# Patient Record
Sex: FEMALE | Race: WHITE | NOT HISPANIC OR LATINO | Employment: OTHER | URBAN - NONMETROPOLITAN AREA
[De-identification: names, ages, dates, MRNs, and addresses within clinical notes are randomized per-mention and may not be internally consistent; named-entity substitution may affect disease eponyms.]

---

## 2017-03-09 ENCOUNTER — OFFICE VISIT (OUTPATIENT)
Dept: CARDIOLOGY | Facility: CLINIC | Age: 52
End: 2017-03-09

## 2017-03-09 VITALS
BODY MASS INDEX: 26.73 KG/M2 | HEART RATE: 98 BPM | HEIGHT: 64 IN | DIASTOLIC BLOOD PRESSURE: 81 MMHG | SYSTOLIC BLOOD PRESSURE: 131 MMHG | OXYGEN SATURATION: 97 % | WEIGHT: 156.6 LBS

## 2017-03-09 DIAGNOSIS — R00.2 PALPITATIONS: ICD-10-CM

## 2017-03-09 DIAGNOSIS — R07.89 OTHER CHEST PAIN: Primary | ICD-10-CM

## 2017-03-09 DIAGNOSIS — R06.02 SHORTNESS OF BREATH: ICD-10-CM

## 2017-03-09 PROBLEM — R07.9 CHEST PAIN: Status: ACTIVE | Noted: 2017-03-09

## 2017-03-09 PROCEDURE — 93000 ELECTROCARDIOGRAM COMPLETE: CPT | Performed by: PHYSICIAN ASSISTANT

## 2017-03-09 PROCEDURE — 99204 OFFICE O/P NEW MOD 45 MIN: CPT | Performed by: PHYSICIAN ASSISTANT

## 2017-03-09 RX ORDER — FERROUS SULFATE 325(65) MG
325 TABLET ORAL DAILY
COMMUNITY
Start: 2017-02-17 | End: 2018-09-25

## 2017-03-09 RX ORDER — GABAPENTIN 800 MG/1
800 TABLET ORAL 3 TIMES DAILY
Status: ON HOLD | COMMUNITY
End: 2021-10-10

## 2017-03-09 RX ORDER — TRAMADOL HYDROCHLORIDE 50 MG/1
50 TABLET ORAL EVERY 6 HOURS PRN
COMMUNITY
End: 2018-09-25

## 2017-03-09 RX ORDER — ATORVASTATIN CALCIUM 20 MG/1
20 TABLET, FILM COATED ORAL DAILY
Qty: 30 TABLET | Refills: 11 | Status: SHIPPED | OUTPATIENT
Start: 2017-03-09 | End: 2018-09-25

## 2017-03-09 RX ORDER — INSULIN GLARGINE 100 [IU]/ML
34 INJECTION, SOLUTION SUBCUTANEOUS 2 TIMES DAILY
Status: ON HOLD | COMMUNITY
End: 2021-10-10

## 2017-03-09 RX ORDER — NITROGLYCERIN 0.4 MG/1
TABLET SUBLINGUAL
Qty: 100 TABLET | Refills: 11 | Status: SHIPPED | OUTPATIENT
Start: 2017-03-09 | End: 2018-09-25

## 2017-03-09 NOTE — PROGRESS NOTES
Subjective   Jessica Bravo is a 51 y.o. female     Chief Complaint   Patient presents with   • Establish Care   • Chest Pain       HPI    Problem list  1. Insulin-dependent diabetes mellitus  2. Neuropathy  3. Iron deficiency anemia  4. Chest pain    Patient is a 51-year-old female that presents to our office for an initial evaluation. She has no known coronary artery disease.    She has been experiencing episodes of chest discomfort described as a stabbing sensation in the left anterior chest. This happens randomly although she does note it worsening with inspiration.    She has mild dyspnea with exertion but nothing that has been progressive. She denies PND orthopnea. She does experience palpitations on occasion. She denies any associated dizziness presyncope or syncope. Otherwise she is feeling well      Current Outpatient Prescriptions   Medication Sig Dispense Refill   • ferrous sulfate 325 (65 FE) MG tablet Daily.     • gabapentin (NEURONTIN) 800 MG tablet Take 800 mg by mouth. Bid - qid     • insulin glargine (LANTUS) 100 UNIT/ML injection Inject 34 Units under the skin 2 (Two) Times a Day.     • metFORMIN (GLUCOPHAGE) 1000 MG tablet Take 500 mg by mouth 2 (Two) Times a Day With Meals.     • traMADol (ULTRAM) 50 MG tablet Take 50 mg by mouth Every 6 (Six) Hours As Needed for moderate pain (4-6).     • aspirin 81 MG tablet Take 1 tablet by mouth Daily. 30 tablet 11   • atorvastatin (LIPITOR) 20 MG tablet Take 1 tablet by mouth Daily. 30 tablet 11   • nitroglycerin (NITROSTAT) 0.4 MG SL tablet 1 under the tongue as needed for angina, may repeat q5mins for up three doses 100 tablet 11     No current facility-administered medications for this visit.        Benadryl [diphenhydramine hcl (sleep)] and Penicillins    Past Medical History   Diagnosis Date   • Arrhythmia    • Chronic kidney disease      only has one kidney   • Diabetes mellitus        Social History     Social History   • Marital status: Single     Spouse  "name: N/A   • Number of children: N/A   • Years of education: N/A     Occupational History   • Not on file.     Social History Main Topics   • Smoking status: Never Smoker   • Smokeless tobacco: Not on file   • Alcohol use No   • Drug use: No   • Sexual activity: Not on file     Other Topics Concern   • Not on file     Social History Narrative   • No narrative on file       @[facility]@    Review of Systems   Constitutional: Positive for fatigue.   HENT: Positive for ear pain.    Eyes: Positive for visual disturbance (reading glasses).   Respiratory: Positive for shortness of breath.    Cardiovascular: Positive for chest pain, palpitations and leg swelling.   Gastrointestinal: Positive for constipation.   Endocrine: Negative.    Genitourinary: Positive for decreased urine volume.   Musculoskeletal: Positive for arthralgias and myalgias.   Skin: Negative.    Allergic/Immunologic: Negative.    Neurological: Positive for dizziness and light-headedness.   Hematological: Bruises/bleeds easily.   Psychiatric/Behavioral: Positive for sleep disturbance.       Objective     Visit Vitals   • /81 (BP Location: Left arm, Patient Position: Sitting)   • Pulse 98   • Ht 64\" (162.6 cm)   • Wt 156 lb 9.6 oz (71 kg)   • SpO2 97%   • BMI 26.88 kg/m2       Lab Results (most recent)     None          Physical Exam   Constitutional: She is oriented to person, place, and time. She appears well-developed and well-nourished. No distress.   HENT:   Head: Normocephalic and atraumatic.   Eyes: EOM are normal. Pupils are equal, round, and reactive to light.   Neck: No JVD present.   Cardiovascular: Normal rate, regular rhythm and normal heart sounds.  Exam reveals no gallop and no friction rub.    No murmur heard.  Pulmonary/Chest: Effort normal and breath sounds normal. No respiratory distress. She has no wheezes. She has no rales. She exhibits no tenderness.   Musculoskeletal: Normal range of motion. She exhibits no edema. "   Neurological: She is alert and oriented to person, place, and time. No cranial nerve deficit.   Skin: Skin is warm and dry. No rash noted. No erythema. No pallor.   Psychiatric: She has a normal mood and affect. Her behavior is normal.   Nursing note and vitals reviewed.      Procedure     ECG 12 Lead  Date/Time: 3/9/2017 1:25 PM  Performed by: MATTY ALFONSO  Authorized by: MATTY ALFONSO   Comments: EKG indication chest pain    EKG demonstrates sinus rhythm at 89 bpm, no acute ST changes                 Assessment/Plan     Problems Addressed this Visit        Cardiovascular and Mediastinum    Palpitations    Relevant Orders    ECG 12 Lead    Stress Test With Myocardial Perfusion One Day    Adult Transthoracic Echo Complete    D-dimer, Quantitative       Respiratory    Shortness of breath    Relevant Orders    Stress Test With Myocardial Perfusion One Day    Adult Transthoracic Echo Complete    D-dimer, Quantitative       Nervous and Auditory    Chest pain - Primary    Relevant Orders    ECG 12 Lead    Stress Test With Myocardial Perfusion One Day    Adult Transthoracic Echo Complete    D-dimer, Quantitative                Recommendations  1. Because of symptoms we would like to schedule for an ischemia assessment. We will also like to get echocardiogram to evaluate systolic and diastolic function and rule out effusion.  2. With pleuritic chest pain, tachycardia, we will like to get a d-dimer to rule out possibility of PE.  3. She is on aspirin daily. We are adding statin because of her diabetic status. We are sending in nitroglycerin as needed for chest pain. We will see her back for follow-up of above testing. Follow-up primary as scheduled

## 2017-03-29 ENCOUNTER — APPOINTMENT (OUTPATIENT)
Dept: CARDIOLOGY | Facility: HOSPITAL | Age: 52
End: 2017-03-29

## 2017-04-18 ENCOUNTER — OUTSIDE FACILITY SERVICE (OUTPATIENT)
Dept: CARDIOLOGY | Facility: CLINIC | Age: 52
End: 2017-04-18

## 2017-04-18 ENCOUNTER — HOSPITAL ENCOUNTER (OUTPATIENT)
Dept: CARDIOLOGY | Facility: HOSPITAL | Age: 52
Discharge: HOME | End: 2017-04-18

## 2017-04-18 DIAGNOSIS — R06.02 SHORTNESS OF BREATH: ICD-10-CM

## 2017-04-18 DIAGNOSIS — R07.9 CHEST PAIN, UNSPECIFIED TYPE: ICD-10-CM

## 2017-04-18 DIAGNOSIS — R53.1 WEAKNESS: ICD-10-CM

## 2017-04-18 LAB
MAXIMAL PREDICTED HEART RATE: 167 BPM
STRESS TARGET HR: 142 BPM

## 2017-04-18 PROCEDURE — 93306 TTE W/DOPPLER COMPLETE: CPT | Performed by: INTERNAL MEDICINE

## 2017-04-18 PROCEDURE — 93306 TTE W/DOPPLER COMPLETE: CPT

## 2017-04-18 PROCEDURE — 93017 CV STRESS TEST TRACING ONLY: CPT

## 2017-04-18 PROCEDURE — 78452 HT MUSCLE IMAGE SPECT MULT: CPT | Performed by: INTERNAL MEDICINE

## 2017-04-18 PROCEDURE — 93018 CV STRESS TEST I&R ONLY: CPT | Performed by: INTERNAL MEDICINE

## 2017-04-18 PROCEDURE — 25010000002 REGADENOSON 0.4 MG/5ML SOLUTION: Performed by: INTERNAL MEDICINE

## 2017-04-18 PROCEDURE — A9500 TC99M SESTAMIBI: HCPCS | Performed by: INTERNAL MEDICINE

## 2017-04-18 PROCEDURE — 0 TECHNETIUM SESTAMIBI: Performed by: INTERNAL MEDICINE

## 2017-04-18 PROCEDURE — 78452 HT MUSCLE IMAGE SPECT MULT: CPT

## 2017-04-18 RX ADMIN — Medication 1 DOSE: at 11:00

## 2017-04-18 RX ADMIN — REGADENOSON 0.4 MG: 0.08 INJECTION, SOLUTION INTRAVENOUS at 11:00

## 2017-04-20 ENCOUNTER — DOCUMENTATION (OUTPATIENT)
Dept: CARDIOLOGY | Facility: CLINIC | Age: 52
End: 2017-04-20

## 2017-04-20 NOTE — PROGRESS NOTES
Patient aware of echo and stress test results and transferred to [facility] to schedule f/u appt. Patient stated that she is never going to have that dye test again because she has been sick as a dog since then. Nausea, diarrhea etc. PH,LPN

## 2018-09-25 ENCOUNTER — OFFICE VISIT (OUTPATIENT)
Dept: CARDIOLOGY | Facility: CLINIC | Age: 53
End: 2018-09-25

## 2018-09-25 VITALS
SYSTOLIC BLOOD PRESSURE: 136 MMHG | HEIGHT: 64 IN | OXYGEN SATURATION: 97 % | DIASTOLIC BLOOD PRESSURE: 81 MMHG | WEIGHT: 177 LBS | BODY MASS INDEX: 30.22 KG/M2 | HEART RATE: 90 BPM

## 2018-09-25 DIAGNOSIS — R07.9 CHEST PAIN, UNSPECIFIED TYPE: Primary | ICD-10-CM

## 2018-09-25 DIAGNOSIS — R53.1 WEAKNESS: ICD-10-CM

## 2018-09-25 DIAGNOSIS — R06.02 SHORTNESS OF BREATH: ICD-10-CM

## 2018-09-25 PROCEDURE — 99214 OFFICE O/P EST MOD 30 MIN: CPT | Performed by: PHYSICIAN ASSISTANT

## 2018-09-25 RX ORDER — NITROGLYCERIN 0.4 MG/1
TABLET SUBLINGUAL
Qty: 100 TABLET | Refills: 11 | Status: ON HOLD | OUTPATIENT
Start: 2018-09-25 | End: 2021-10-10

## 2018-09-25 RX ORDER — ISOSORBIDE MONONITRATE 30 MG/1
30 TABLET, EXTENDED RELEASE ORAL EVERY MORNING
Qty: 30 TABLET | Refills: 11 | Status: ON HOLD | OUTPATIENT
Start: 2018-09-25 | End: 2021-10-10

## 2018-09-25 RX ORDER — CLOPIDOGREL BISULFATE 75 MG/1
75 TABLET ORAL DAILY
Qty: 30 TABLET | Refills: 11 | Status: ON HOLD | OUTPATIENT
Start: 2018-09-25 | End: 2021-10-10

## 2018-09-25 NOTE — PROGRESS NOTES
Problem list     Subjective   Jessica Bravo is a 53 y.o. female     Chief Complaint   Patient presents with   • Chest Pain     presents as a follow up   • Shortness of Breath       HPI    Problem list  1.  Chest pain  1.1 nuclear stress test April 2018 with no evidence of ischemia  2.  Preserved systolic function  3.  Insulin-dependent diabetes mellitus  4.  Dyslipidemia    Patient is a 53-year-old female that presents back for follow-up.  She had an ischemia assessment done a few months ago.  Workup was unremarkable.  Patient was since referred back to the office as she has had progressive and profound symptoms.  Patient describes having significant chest pressure and heaviness that is now occurring at rest.  She has been experiencing arm discomfort and arm heaviness.  She also describes profile levels of exertional dyspnea.  She describes just walking across her living room she will have profound dyspnea and weakness.  She feels her per symptoms of chest pain and shortness of breath or progressed to significant degree.  She has significant weakness.  She describes a lack of energy.  She is concerned about the progressive nature of her symptoms.  She has no palpitations or dysrhythmic symptoms.  She otherwise is doing well      Outpatient Encounter Prescriptions as of 9/25/2018   Medication Sig Dispense Refill   • aspirin 81 MG tablet Take 1 tablet by mouth Daily. 30 tablet 11   • gabapentin (NEURONTIN) 800 MG tablet Take 800 mg by mouth. Bid - qid     • insulin glargine (LANTUS) 100 UNIT/ML injection Inject 34 Units under the skin 2 (Two) Times a Day.     • [DISCONTINUED] atorvastatin (LIPITOR) 20 MG tablet Take 1 tablet by mouth Daily. 30 tablet 11   • [DISCONTINUED] ferrous sulfate 325 (65 FE) MG tablet Take 325 mg by mouth Daily.     • clopidogrel (PLAVIX) 75 MG tablet Take 1 tablet by mouth Daily. 30 tablet 11   • isosorbide mononitrate (IMDUR) 30 MG 24 hr tablet Take 1 tablet by mouth Every Morning. 30 tablet 11    • nitroglycerin (NITROSTAT) 0.4 MG SL tablet 1 under the tongue as needed for angina, may repeat q5mins for up three doses 100 tablet 11   • [DISCONTINUED] metFORMIN (GLUCOPHAGE) 1000 MG tablet Take 500 mg by mouth 2 (Two) Times a Day With Meals.     • [DISCONTINUED] nitroglycerin (NITROSTAT) 0.4 MG SL tablet 1 under the tongue as needed for angina, may repeat q5mins for up three doses 100 tablet 11   • [DISCONTINUED] traMADol (ULTRAM) 50 MG tablet Take 50 mg by mouth Every 6 (Six) Hours As Needed for moderate pain (4-6).       No facility-administered encounter medications on file as of 9/25/2018.        Benadryl [diphenhydramine hcl (sleep)] and Penicillins    Past Medical History:   Diagnosis Date   • Arrhythmia    • Chronic kidney disease     only has one kidney, has been in hoptal 3 times since last visit    • Diabetes mellitus ([facility])        Social History     Social History   • Marital status: Single     Spouse name: N/A   • Number of children: N/A   • Years of education: N/A     Occupational History   • Not on file.     Social History Main Topics   • Smoking status: Never Smoker   • Smokeless tobacco: Never Used   • Alcohol use No   • Drug use: No   • Sexual activity: Not on file     Other Topics Concern   • Not on file     Social History Narrative   • No narrative on file       Family History   Problem Relation Age of Onset   • Diabetes Mother    • Hypertension Mother    • Diabetes Father    • Hypertension Father    • Diabetes Sister    • Hypertension Sister    • Diabetes Brother        Review of Systems   Constitutional: Positive for fatigue.   HENT: Negative.    Eyes: Negative for visual disturbance.   Respiratory: Positive for shortness of breath.    Cardiovascular: Positive for chest pain ( arm pain, squeezing feeling in chest ) and palpitations. Negative for leg swelling.   Gastrointestinal: Positive for abdominal pain ( heart burn), nausea and vomiting. Negative for constipation and diarrhea.  "  Endocrine: Negative.    Genitourinary: Negative for difficulty urinating.   Musculoskeletal: Positive for arthralgias, back pain and neck pain. Negative for myalgias.   Skin: Negative.    Allergic/Immunologic: Negative for environmental allergies and food allergies.   Neurological: Negative for dizziness and light-headedness.   Hematological: Does not bruise/bleed easily.   Psychiatric/Behavioral: The patient is nervous/anxious.    All other systems reviewed and are negative.      Objective   Vitals:    09/25/18 1059   BP: 136/81   BP Location: Left arm   Patient Position: Sitting   Pulse: 90   SpO2: 97%   Weight: 80.3 kg (177 lb)   Height: 162.6 cm (64\")      /81 (BP Location: Left arm, Patient Position: Sitting)   Pulse 90   Ht 162.6 cm (64\")   Wt 80.3 kg (177 lb)   SpO2 97%   BMI 30.38 kg/m²     Lab Results (most recent)     None          Physical Exam   Constitutional: She is oriented to person, place, and time. She appears well-developed and well-nourished. No distress.   HENT:   Head: Normocephalic and atraumatic.   Eyes: Pupils are equal, round, and reactive to light. EOM are normal.   Neck: No JVD present.   Cardiovascular: Normal rate, regular rhythm, normal heart sounds and intact distal pulses.  Exam reveals no gallop and no friction rub.    No murmur heard.  Pulmonary/Chest: Effort normal and breath sounds normal. No respiratory distress. She has no wheezes. She has no rales. She exhibits no tenderness.   Musculoskeletal: Normal range of motion. She exhibits no edema.   Neurological: She is alert and oriented to person, place, and time. No cranial nerve deficit.   Skin: Skin is warm and dry. No rash noted. No erythema. No pallor.   Psychiatric: She has a normal mood and affect. Her behavior is normal.   Nursing note and vitals reviewed.      Procedure   Procedures       Assessment/Plan     Problems Addressed this Visit        Respiratory    Shortness of breath    Relevant Orders    CBC & " Differential    Comprehensive Metabolic Panel    Cardiac catheterization       Nervous and Auditory    Chest pain - Primary    Relevant Orders    CBC & Differential    Comprehensive Metabolic Panel    Cardiac catheterization       Other    Weakness    Relevant Orders    CBC & Differential    Comprehensive Metabolic Panel    Cardiac catheterization              Recommendation  1.  Patient is very concerned about the nature of her symptoms and the rapid progression.  She describes having profile levels of exertional dyspnea, significant declining functional status with resting chest discomfort and referral of pain into her arm.  She has significant risk factors to include insulin-dependent diabetes mellitus, dyslipidemia and hypertension.  Patient would like definitive assessment of her coronary anatomy.  Therefore because of progressive low-level symptoms and being prescribed antianginal therapy, she will be scheduled for left heart catheterization for definitive assessment.  2.  We will see patient back for follow-up after above testing.  She will be placed on dual antiplatelet therapy.  She describes already being on cholesterol medication.  We currently do not have that but that was prescribed by primary.  We will see her back for follow-up after catheterization.  Any chest pain or supple nitroglycerin, she'll go to the ER.  She will follow-up primary as scheduled            Patient's Body mass index is 30.38 kg/m². BMI is above normal parameters. Recommendations include: educational material.       Electronically signed by:

## 2018-09-25 NOTE — PATIENT INSTRUCTIONS
Obesity, Adult  Obesity is the condition of having too much total body fat. Being overweight or obese means that your weight is greater than what is considered healthy for your body size. Obesity is determined by a measurement called BMI. BMI is an estimate of body fat and is calculated from height and weight. For adults, a BMI of 30 or higher is considered obese.  Obesity can eventually lead to other health concerns and major illnesses, including:  · Stroke.  · Coronary artery disease (CAD).  · Type 2 diabetes.  · Some types of cancer, including cancers of the colon, breast, uterus, and gallbladder.  · Osteoarthritis.  · High blood pressure (hypertension).  · High cholesterol.  · Sleep apnea.  · Gallbladder stones.  · Infertility problems.    What are the causes?  The main cause of obesity is taking in (consuming) more calories than your body uses for energy. Other factors that contribute to this condition may include:  · Being born with genes that make you more likely to become obese.  · Having a medical condition that causes obesity. These conditions include:  ? Hypothyroidism.  ? Polycystic ovarian syndrome (PCOS).  ? Binge-eating disorder.  ? Cushing syndrome.  · Taking certain medicines, such as steroids, antidepressants, and seizure medicines.  · Not being physically active (sedentary lifestyle).  · Living where there are limited places to exercise safely or buy healthy foods.  · Not getting enough sleep.    What increases the risk?  The following factors may increase your risk of this condition:  · Having a family history of obesity.  · Being a woman of -American descent.  · Being a man of  descent.    What are the signs or symptoms?  Having excessive body fat is the main symptom of this condition.  How is this diagnosed?  This condition may be diagnosed based on:  · Your symptoms.  · Your medical history.  · A physical exam. Your health care provider may measure:  ? Your BMI. If you are an  adult with a BMI between 25 and less than 30, you are considered overweight. If you are an adult with a BMI of 30 or higher, you are considered obese.  ? The distances around your hips and your waist (circumferences). These may be compared to each other to help diagnose your condition.  ? Your skinfold thickness. Your health care provider may gently pinch a fold of your skin and measure it.    How is this treated?  Treatment for this condition often includes changing your lifestyle. Treatment may include some or all of the following:  · Dietary changes. Work with your health care provider and a dietitian to set a weight-loss goal that is healthy and reasonable for you. Dietary changes may include eating:  ? Smaller portions. A portion size is the amount of a particular food that is healthy for you to eat at one time. This varies from person to person.  ? Low-calorie or low-fat options.  ? More whole grains, fruits, and vegetables.  · Regular physical activity. This may include aerobic activity (cardio) and strength training.  · Medicine to help you lose weight. Your health care provider may prescribe medicine if you are unable to lose 1 pound a week after 6 weeks of eating more healthily and doing more physical activity.  · Surgery. Surgical options may include gastric banding and gastric bypass. Surgery may be done if:  ? Other treatments have not helped to improve your condition.  ? You have a BMI of 40 or higher.  ? You have life-threatening health problems related to obesity.    Follow these instructions at home:    Eating and drinking    · Follow recommendations from your health care provider about what you eat and drink. Your health care provider may advise you to:  ? Limit fast foods, sweets, and processed snack foods.  ? Choose low-fat options, such as low-fat milk instead of whole milk.  ? Eat 5 or more servings of fruits or vegetables every day.  ? Eat at home more often. This gives you more control over  what you eat.  ? Choose healthy foods when you eat out.  ? Learn what a healthy portion size is.  ? Keep low-fat snacks on hand.  ? Avoid sugary drinks, such as soda, fruit juice, iced tea sweetened with sugar, and flavored milk.  ? Eat a healthy breakfast.  · Drink enough water to keep your urine clear or pale yellow.  · Do not go without eating for long periods of time (do not fast) or follow a fad diet. Fasting and fad diets can be unhealthy and even dangerous.  Physical Activity  · Exercise regularly, as told by your health care provider. Ask your health care provider what types of exercise are safe for you and how often you should exercise.  · Warm up and stretch before being active.  · Cool down and stretch after being active.  · Rest between periods of activity.  Lifestyle  · Limit the time that you spend in front of your TV, computer, or video game system.  · Find ways to reward yourself that do not involve food.  · Limit alcohol intake to no more than 1 drink a day for nonpregnant women and 2 drinks a day for men. One drink equals 12 oz of beer, 5 oz of wine, or 1½ oz of hard liquor.  General instructions  · Keep a weight loss journal to keep track of the food you eat and how much you exercise you get.  · Take over-the-counter and prescription medicines only as told by your health care provider.  · Take vitamins and supplements only as told by your health care provider.  · Consider joining a support group. Your health care provider may be able to recommend a support group.  · Keep all follow-up visits as told by your health care provider. This is important.  Contact a health care provider if:  · You are unable to meet your weight loss goal after 6 weeks of dietary and lifestyle changes.  This information is not intended to replace advice given to you by your health care provider. Make sure you discuss any questions you have with your health care provider.  Document Released: 01/25/2006 Document Revised:  05/22/2017 Document Reviewed: 10/05/2016  [employer] Interactive Patient Education © 2018 [employer]  MyPlate from [employer]  The general, healthful diet is based on the 2010 Dietary Guidelines for Americans. The amount of food you need to eat from each food group depends on your age, sex, and level of physical activity and can be individualized by a dietitian. Go to ChooseMyPlate.gov for more information.  What do I need to know about the MyPlate plan?  · Enjoy your food, but eat less.  · Avoid oversized portions.  ? ½ of your plate should include fruits and vegetables.  ? ¼ of your plate should be grains.  ? ¼ of your plate should be protein.  Grains  · Make at least half of your grains whole grains.  · For a 2,000 calorie daily food plan, eat 6 oz every day.  · 1 oz is about 1 slice bread, 1 cup cereal, or ½ cup cooked rice, cereal, or pasta.  Vegetables  · Make half your plate fruits and vegetables.  · For a 2,000 calorie daily food plan, eat 2½ cups every day.  · 1 cup is about 1 cup raw or cooked vegetables or vegetable juice or 2 cups raw leafy greens.  Fruits  · Make half your plate fruits and vegetables.  · For a 2,000 calorie daily food plan, eat 2 cups every day.  · 1 cup is about 1 cup fruit or 100% fruit juice or ½ cup dried fruit.  Protein  · For a 2,000 calorie daily food plan, eat 5½ oz every day.  · 1 oz is about 1 oz meat, poultry, or fish, ¼ cup cooked beans, 1 egg, 1 Tbsp peanut butter, or ½ oz nuts or seeds.  Dairy  · Switch to fat-free or low-fat (1%) milk.  · For a 2,000 calorie daily food plan, eat 3 cups every day.  · 1 cup is about 1 cup milk or yogurt or soy milk (soy beverage), 1½ oz natural cheese, or 2 oz processed cheese.  Fats, Oils, and Empty Calories  · Only small amounts of oils are recommended.  · Empty calories are calories from solid fats or added sugars.  · Compare sodium in foods like soup, bread, and frozen meals. Choose the foods with lower numbers.  · Drink water instead of  sugary drinks.  What foods can I eat?  Grains  Whole grains such as whole wheat, quinoa, millet, and bulgur. Bread, rolls, and pasta made from whole grains. Brown or wild rice. Hot or cold cereals made from whole grains and without added sugar.  Vegetables  All fresh vegetables, especially fresh red, dark green, or orange vegetables. Peas and beans. Low-sodium frozen or canned vegetables prepared without added salt. Low-sodium vegetable juices.  Fruits  All fresh, frozen, and dried fruits. Canned fruit packed in water or fruit juice without added sugar. Fruit juices without added sugar.  Meats and Other Protein Sources  Boiled, baked, or grilled lean meat trimmed of fat. Skinless poultry. Fresh seafood and shellfish. Canned seafood packed in water. Unsalted nuts and unsalted nut butters. Tofu. Dried beans and pea. Eggs.  Dairy  Low-fat or fat-free milk, yogurt, and cheeses.  Sweets and Desserts  Frozen desserts made from low-fat milk.  Fats and Oils  Olive, peanut, and canola oils and margarine. Salad dressing and mayonnaise made from these oils.  Other  Soups and casseroles made from allowed ingredients and without added fat or salt.  The items listed above may not be a complete list of recommended foods or beverages. Contact your dietitian for more options.  What foods are not recommended?  Grains  Sweetened, low-fiber cereals. Packaged baked goods. Snack crackers and chips. Cheese crackers, butter crackers, and biscuits. Frozen waffles, sweet breads, doughnuts, pastries, packaged baking mixes, pancakes, cakes, and cookies.  Vegetables  Regular canned or frozen vegetables or vegetables prepared with salt. Canned tomatoes. Canned tomato sauce. Fried vegetables. Vegetables in cream sauce or cheese sauce.  Fruits  Fruits packed in syrup or made with added sugar.  Meats and Other Protein Sources  Marbled or fatty meats such as ribs. Poultry with skin. Fried meats, poultry, eggs, or fish. Sausages, hot dogs, and deli  meats such as pastrami, bologna, or salami.  Dairy  Whole milk, cream, cheeses made from whole milk, sour cream. Ice cream or yogurt made from whole milk or with added sugar.  Beverages  For adults, no more than one alcoholic drink per day. Regular soft drinks or other sugary beverages. Juice drinks.  Sweets and Desserts  Sugary or fatty desserts, candy, and other sweets.  Fats and Oils  Solid shortening or partially hydrogenated oils. Solid margarine. Margarine that contains trans fats. Butter.  The items listed above may not be a complete list of foods and beverages to avoid. Contact your dietitian for more information.  This information is not intended to replace advice given to you by your health care provider. Make sure you discuss any questions you have with your health care provider.  Document Released: 01/06/2009 Document Revised: 05/25/2017 Document Reviewed: 11/26/2014  [employer] Interactive Patient Education © 2018 [employer].

## 2018-10-18 ENCOUNTER — OFFICE VISIT (OUTPATIENT)
Dept: CARDIOLOGY | Facility: CLINIC | Age: 53
End: 2018-10-18

## 2018-10-18 ENCOUNTER — TELEPHONE (OUTPATIENT)
Dept: CARDIOLOGY | Facility: CLINIC | Age: 53
End: 2018-10-18

## 2018-10-18 VITALS
BODY MASS INDEX: 29.81 KG/M2 | HEIGHT: 64 IN | OXYGEN SATURATION: 98 % | SYSTOLIC BLOOD PRESSURE: 138 MMHG | DIASTOLIC BLOOD PRESSURE: 86 MMHG | WEIGHT: 174.6 LBS | HEART RATE: 98 BPM

## 2018-10-18 DIAGNOSIS — R07.9 CHEST PAIN, UNSPECIFIED TYPE: ICD-10-CM

## 2018-10-18 DIAGNOSIS — R06.02 SHORTNESS OF BREATH: Primary | ICD-10-CM

## 2018-10-18 DIAGNOSIS — I10 ESSENTIAL HYPERTENSION: ICD-10-CM

## 2018-10-18 PROCEDURE — 99213 OFFICE O/P EST LOW 20 MIN: CPT | Performed by: PHYSICIAN ASSISTANT

## 2018-10-18 RX ORDER — FUROSEMIDE 80 MG
80 TABLET ORAL 2 TIMES DAILY
COMMUNITY
End: 2021-10-15 | Stop reason: HOSPADM

## 2018-10-18 NOTE — TELEPHONE ENCOUNTER
Aimee Child MA [place], [place]   Caller: Unspecified (2 weeks ago)             I attempted to call the patient with no answer. Verbal order per Darrick Zeng PA-C to cancel cath and bring patient back in 2 weeks for a follow up visit due to elevated blood levels.

## 2018-10-18 NOTE — PATIENT INSTRUCTIONS

## 2018-10-18 NOTE — PROGRESS NOTES
Problem list     Subjective   Jessica Bravo is a 53 y.o. female     Chief Complaint   Patient presents with   • Follow-up     patient appears in office today for follow up to discuss heart cath    • Chest Pain       HPI    Problem list  1.  Chest pain  1.1 nuclear stress test April 2018 with no evidence of ischemia  2.  Preserved systolic function  3.  Insulin-dependent diabetes mellitus  4.  Dyslipidemia  5.  Stage/5 chronic kidney disease    Patient is a 53-year-old female that presents back to the office for follow-up.  Patient was scheduled for cardiac catheterization but we were not made aware of patient's severe renal disease.  Week perform routine laboratories prior to catheterization demonstrating severe chronic kidney disease with a GFR of 18.  We made referral back to nephrology.  Apparently, she sees Dr. Araiza.  We discussed with her about her renal issues and that she would need cardiac clearance.    Fortunately, patient's chest pain is resolved.  She described at the time that she had discomfort that she had recently lost her mother and she was undergoing much stress and anxiety.  She is now improved.  She has no chest discomfort at all.  Shortness of breath is mild and she does not describe progressive dyspnea.  No PND orthopnea.    She does note palpitations.  She described a severe episode yesterday when she fell a rapid irregular heartbeat that occurred and lasted for quite some time before resolving.  She had no presyncope or syncope.  However, symptoms were quite profound according to the patient.  Otherwise, she is doing well        Outpatient Encounter Prescriptions as of 10/18/2018   Medication Sig Dispense Refill   • aspirin 81 MG tablet Take 1 tablet by mouth Daily. 30 tablet 11   • clopidogrel (PLAVIX) 75 MG tablet Take 1 tablet by mouth Daily. 30 tablet 11   • furosemide (LASIX) 80 MG tablet Take 80 mg by mouth 2 (Two) Times a Day.     • gabapentin (NEURONTIN) 800 MG tablet Take 800 mg by  mouth. Bid - qid     • insulin glargine (LANTUS) 100 UNIT/ML injection Inject 34 Units under the skin 2 (Two) Times a Day.     • isosorbide mononitrate (IMDUR) 30 MG 24 hr tablet Take 1 tablet by mouth Every Morning. 30 tablet 11   • nitroglycerin (NITROSTAT) 0.4 MG SL tablet 1 under the tongue as needed for angina, may repeat q5mins for up three doses 100 tablet 11     No facility-administered encounter medications on file as of 10/18/2018.        Benadryl [diphenhydramine hcl (sleep)] and Penicillins    Past Medical History:   Diagnosis Date   • Arrhythmia    • Chronic kidney disease     only has one kidney, has been in hopsital 3 times since last visit    • Diabetes mellitus (CMS/[facility])        Social History     Social History   • Marital status: Single     Spouse name: N/A   • Number of children: N/A   • Years of education: N/A     Occupational History   • Not on file.     Social History Main Topics   • Smoking status: Never Smoker   • Smokeless tobacco: Never Used   • Alcohol use No   • Drug use: No   • Sexual activity: Defer     Other Topics Concern   • Not on file     Social History Narrative   • No narrative on file       Family History   Problem Relation Age of Onset   • Diabetes Mother    • Hypertension Mother    • Diabetes Father    • Hypertension Father    • Diabetes Sister    • Hypertension Sister    • Diabetes Brother        Review of Systems   Constitutional: Positive for fatigue (easily fatigued).   HENT: Positive for rhinorrhea (runny nose due to allergies). Negative for congestion and sneezing.    Eyes: Negative.  Negative for visual disturbance.   Respiratory: Positive for shortness of breath (easily SOA; worse on exertion). Negative for apnea, cough, chest tightness and wheezing.    Cardiovascular: Positive for palpitations (occasional palpitations) and leg swelling (BLE swelling/edema). Negative for chest pain (precordial pain ).   Gastrointestinal: Negative.  Negative for abdominal distention,  "abdominal pain, nausea and vomiting.   Endocrine: Negative.  Negative for cold intolerance and heat intolerance.   Genitourinary: Positive for frequency (urine frequency). Negative for difficulty urinating and urgency.   Musculoskeletal: Negative.  Negative for arthralgias, back pain, myalgias, neck pain and neck stiffness.   Skin: Negative.  Negative for rash and wound.   Allergic/Immunologic: Negative.  Negative for environmental allergies and food allergies.   Neurological: Positive for headaches (frequent H/A's). Negative for dizziness, syncope, weakness and light-headedness.   Hematological: Bruises/bleeds easily (bruises and bleeds easily).   Psychiatric/Behavioral: Positive for sleep disturbance (wakes up smothering/SOA). Negative for agitation and confusion. The patient is nervous/anxious (easily nervous/anxious).    All other systems reviewed and are negative.      Objective   Vitals:    10/18/18 1417   BP: 138/86   BP Location: Left arm   Patient Position: Sitting   Pulse: 98   SpO2: 98%   Weight: 79.2 kg (174 lb 9.6 oz)   Height: 162.6 cm (64\")      /86 (BP Location: Left arm, Patient Position: Sitting)   Pulse 98   Ht 162.6 cm (64\")   Wt 79.2 kg (174 lb 9.6 oz)   SpO2 98%   BMI 29.97 kg/m²     Lab Results (most recent)     None          Physical Exam   Constitutional: She is oriented to person, place, and time. She appears well-developed and well-nourished. No distress.   HENT:   Head: Normocephalic and atraumatic.   Eyes: Pupils are equal, round, and reactive to light. EOM are normal.   Neck: No JVD present.   Cardiovascular: Normal rate, regular rhythm, normal heart sounds and intact distal pulses.  Exam reveals no gallop and no friction rub.    No murmur heard.  Pulmonary/Chest: Effort normal and breath sounds normal. No respiratory distress. She has no wheezes. She has no rales. She exhibits no tenderness.   Musculoskeletal: Normal range of motion. She exhibits no edema.   Neurological: " She is alert and oriented to person, place, and time. No cranial nerve deficit.   Skin: Skin is warm and dry. No rash noted. No erythema. No pallor.   Psychiatric: She has a normal mood and affect. Her behavior is normal.   Nursing note and vitals reviewed.      Procedure   Procedures       Assessment/Plan     Problems Addressed this Visit        Cardiovascular and Mediastinum    Essential hypertension    Relevant Medications    furosemide (LASIX) 80 MG tablet       Respiratory    Shortness of breath - Primary       Nervous and Auditory    Chest pain            Recommendation  1.  Patient was scheduled to undergo elective outpatient cardiac catheterization in the setting that she failed maximum guide line directed therapy as her stress test was negative.  However, her labs demonstrate that she has severe chronic kidney disease.  The only way that I would consider even scheduling cardiac catheterization was if the patient had a high risk stress test or refractory symptoms that were severe on guide line directed therapy.  Only then, would we reconsider if she would be agreeable to undergo dialysis.  For now, stress test was low risk.  Her symptoms have resolved and was likely related to stress and anxiety as she recently lost her mother.  Currently, she has no symptoms.  Therefore, I feel that medical management is best option for this patient.  She is on antiplatelet therapy.  I do recommend that she be on statin therapy because of diabetes.  She is following with primary and nephrology who is monitoring her renal function and other routine laboratories.  2.  Otherwise, we will see her back for follow-up in 6 months or year or sooner as symptoms discussed         Patient's Body mass index is 29.97 kg/m². BMI is above normal parameters. Recommendations include: educational material and referral to primary care.       Electronically signed by:

## 2019-03-18 ENCOUNTER — TELEPHONE (OUTPATIENT)
Dept: CARDIOLOGY | Facility: CLINIC | Age: 54
End: 2019-03-18

## 2019-03-18 NOTE — TELEPHONE ENCOUNTER
Called [facility], asked them who ordered mammogram, as it wasn't Jayleen Moore. I was told that it was ordered by Buck Avina.

## 2019-03-18 NOTE — TELEPHONE ENCOUNTER
----- Message from WISAM Morrison sent at 3/18/2019  1:24 PM EDT -----  I have never seen this patient, not sure how she had a mammogram under my name.  Can you call the imaging place where it was completed and find out?  Thanks!

## 2019-10-22 ENCOUNTER — TELEPHONE (OUTPATIENT)
Dept: CARDIOLOGY | Facility: CLINIC | Age: 54
End: 2019-10-22

## 2019-10-22 NOTE — TELEPHONE ENCOUNTER
THE PT LEFT A VM ON THE TRIAGE LINE.  THE PM CALLED THE PT BACK HOWEVER, WAS REQUIRED TO LEAVE A VM DUE TO NO ANSWER.

## 2020-02-05 ENCOUNTER — OFFICE VISIT (OUTPATIENT)
Dept: CARDIOLOGY | Facility: CLINIC | Age: 55
End: 2020-02-05

## 2020-02-05 VITALS
HEART RATE: 87 BPM | RESPIRATION RATE: 16 BRPM | BODY MASS INDEX: 29.37 KG/M2 | OXYGEN SATURATION: 98 % | HEIGHT: 64 IN | DIASTOLIC BLOOD PRESSURE: 87 MMHG | SYSTOLIC BLOOD PRESSURE: 143 MMHG | WEIGHT: 172 LBS

## 2020-02-05 DIAGNOSIS — R00.2 PALPITATIONS: Primary | ICD-10-CM

## 2020-02-05 DIAGNOSIS — R60.0 LOWER EXTREMITY EDEMA: ICD-10-CM

## 2020-02-05 DIAGNOSIS — R06.02 SHORTNESS OF BREATH: ICD-10-CM

## 2020-02-05 PROCEDURE — 99213 OFFICE O/P EST LOW 20 MIN: CPT | Performed by: PHYSICIAN ASSISTANT

## 2020-02-05 PROCEDURE — 93000 ELECTROCARDIOGRAM COMPLETE: CPT | Performed by: PHYSICIAN ASSISTANT

## 2020-02-05 NOTE — PATIENT INSTRUCTIONS
"Fat and Cholesterol Restricted Eating Plan  Getting too much fat and cholesterol in your diet may cause health problems. Choosing the right foods helps keep your fat and cholesterol at normal levels. This can keep you from getting certain diseases.  Your doctor may recommend an eating plan that includes:  · Total fat: ______% or less of total calories a day.  · Saturated fat: ______% or less of total calories a day.  · Cholesterol: less than _________mg a day.  · Fiber: ______g a day.  What are tips for following this plan?  Meal planning  · At meals, divide your plate into four equal parts:  ? Fill one-half of your plate with vegetables and green salads.  ? Fill one-fourth of your plate with whole grains.  ? Fill one-fourth of your plate with low-fat (lean) protein foods.  · Eat fish that is high in omega-3 fats at least two times a week. This includes mackerel, tuna, sardines, and salmon.  · Eat foods that are high in fiber, such as whole grains, beans, apples, broccoli, carrots, peas, and barley.  General tips    · Work with your doctor to lose weight if you need to.  · Avoid:  ? Foods with added sugar.  ? Fried foods.  ? Foods with partially hydrogenated oils.  · Limit alcohol intake to no more than 1 drink a day for nonpregnant women and 2 drinks a day for men. One drink equals 12 oz of beer, 5 oz of wine, or 1½ oz of hard liquor.  Reading food labels  · Check food labels for:  ? Trans fats.  ? Partially hydrogenated oils.  ? Saturated fat (g) in each serving.  ? Cholesterol (mg) in each serving.  ? Fiber (g) in each serving.  · Choose foods with healthy fats, such as:  ? Monounsaturated fats.  ? Polyunsaturated fats.  ? Omega-3 fats.  · Choose grain products that have whole grains. Look for the word \"whole\" as the first word in the ingredient list.  Cooking  · Cook foods using low-fat methods. These include baking, boiling, grilling, and broiling.  · Eat more home-cooked foods. Eat at restaurants and buffets " less often.  · Avoid cooking using saturated fats, such as butter, cream, palm oil, palm kernel oil, and coconut oil.  Recommended foods    Fruits  · All fresh, canned (in natural juice), or frozen fruits.  Vegetables  · Fresh or frozen vegetables (raw, steamed, roasted, or grilled). Green salads.  Grains  · Whole grains, such as whole wheat or whole grain breads, crackers, cereals, and pasta. Unsweetened oatmeal, bulgur, barley, quinoa, or brown rice. Corn or whole wheat flour tortillas.  Meats and other protein foods  · Ground beef (85% or leaner), grass-fed beef, or beef trimmed of fat. Skinless chicken or turkey. Ground chicken or turkey. Pork trimmed of fat. All fish and seafood. Egg whites. Dried beans, peas, or lentils. Unsalted nuts or seeds. Unsalted canned beans. Nut butters without added sugar or oil.  Dairy  · Low-fat or nonfat dairy products, such as skim or 1% milk, 2% or reduced-fat cheeses, low-fat and fat-free ricotta or cottage cheese, or plain low-fat and nonfat yogurt.  Fats and oils  · Tub margarine without trans fats. Light or reduced-fat mayonnaise and salad dressings. Avocado. Olive, canola, sesame, or safflower oils.  The items listed above may not be a complete list of foods and beverages you can eat. Contact a dietitian for more information.  Foods to avoid  Fruits  · Canned fruit in heavy syrup. Fruit in cream or butter sauce. Fried fruit.  Vegetables  · Vegetables cooked in cheese, cream, or butter sauce. Fried vegetables.  Grains  · White bread. White pasta. White rice. Cornbread. Bagels, pastries, and croissants. Crackers and snack foods that contain trans fat and hydrogenated oils.  Meats and other protein foods  · Fatty cuts of meat. Ribs, chicken wings, toribio, sausage, bologna, salami, chitterlings, fatback, hot dogs, bratwurst, and packaged lunch meats. Liver and organ meats. Whole eggs and egg yolks. Chicken and turkey with skin. Fried meat.  Dairy  · Whole or 2% milk, cream,  half-and-half, and cream cheese. Whole milk cheeses. Whole-fat or sweetened yogurt. Full-fat cheeses. Nondairy creamers and whipped toppings. Processed cheese, cheese spreads, and cheese curds.  Beverages  · Alcohol. Sugar-sweetened drinks such as sodas, lemonade, and fruit drinks.  Fats and oils  · Butter, stick margarine, lard, shortening, ghee, or toribio fat. Coconut, palm kernel, and palm oils.  Sweets and desserts  · Corn syrup, sugars, honey, and molasses. Candy. Jam and jelly. Syrup. Sweetened cereals. Cookies, pies, cakes, donuts, muffins, and ice cream.  The items listed above may not be a complete list of foods and beverages you should avoid. Contact a dietitian for more information.  Summary  · Choosing the right foods helps keep your fat and cholesterol at normal levels. This can keep you from getting certain diseases.  · At meals, fill one-half of your plate with vegetables and green salads.  · Eat high-fiber foods, like whole grains, beans, apples, carrots, peas, and barley.  · Limit added sugar, saturated fats, alcohol, and fried foods.  This information is not intended to replace advice given to you by your health care provider. Make sure you discuss any questions you have with your health care provider.  Document Released: 06/18/2013 Document Revised: 08/21/2019 Document Reviewed: 09/04/2018  [employer] Interactive Patient Education © 2019 [employer]  BMI for Adults    Body mass index (BMI) is a number that is calculated from a person's weight and height. BMI may help to estimate how much of a person's weight is composed of fat. BMI can help identify those who may be at higher risk for certain medical problems.  How is BMI used with adults?  BMI is used as a screening tool to identify possible weight problems. It is used to check whether a person is obese, overweight, healthy weight, or underweight.  How is BMI calculated?  BMI measures your weight and compares it to your height. This can be done  "either in English ([place] or metric measurements. Note that charts are available to help you find your BMI quickly and easily without having to do these calculations yourself.  To calculate your BMI in English ([place] measurements, your health care provider will:  1. Measure your weight in pounds (lb).  2. Multiply the number of pounds by 703.  ? For example, for a person who weighs 180 lb, multiply that number by 703, which equals 126,540.  3. Measure your height in inches (in). Then multiply that number by itself to get a measurement called \"inches squared.\"  ? For example, for a person who is 70 in tall, the \"inches squared\" measurement is 70 in x 70 in, which equals 4900 inches squared.  4. Divide the total from Step 2 (number of lb x 703) by the total from Step 3 (inches squared): 126,540 ÷ 4900 = 25.8. This is your BMI.  To calculate your BMI in metric measurements, your health care provider will:  1. Measure your weight in kilograms (kg).  2. Measure your height in meters (m). Then multiply that number by itself to get a measurement called \"meters squared.\"  ? For example, for a person who is 1.75 m tall, the \"meters squared\" measurement is 1.75 m x 1.75 m, which is equal to 3.1 meters squared.  3. Divide the number of kilograms (your weight) by the meters squared number. In this example: 70 ÷ 3.1 = 22.6. This is your BMI.  How is BMI interpreted?  To interpret your results, your health care provider will use BMI charts to identify whether you are underweight, normal weight, overweight, or obese. The following guidelines will be used:  · Underweight: BMI less than 18.5.  · Normal weight: BMI between 18.5 and 24.9.  · Overweight: BMI between 25 and 29.9.  · Obese: BMI of 30 and above.  Please note:  · Weight includes both fat and muscle, so someone with a muscular build, such as an athlete, may have a BMI that is higher than 24.9. In cases like these, BMI is not an accurate measure of body fat.  · To determine " if excess body fat is the cause of a BMI of 25 or higher, further assessments may need to be done by a health care provider.  · BMI is usually interpreted in the same way for men and women.  Why is BMI a useful tool?  BMI is useful in two ways:  · Identifying a weight problem that may be related to a medical condition, or that may increase the risk for medical problems.  · Promoting lifestyle and diet changes in order to reach a healthy weight.  Summary  · Body mass index (BMI) is a number that is calculated from a person's weight and height.  · BMI may help to estimate how much of a person's weight is composed of fat. BMI can help identify those who may be at higher risk for certain medical problems.  · BMI can be measured using English measurements or metric measurements.  · To interpret your results, your health care provider will use BMI charts to identify whether you are underweight, normal weight, overweight, or obese.  This information is not intended to replace advice given to you by your health care provider. Make sure you discuss any questions you have with your health care provider.  Document Released: 08/29/2005 Document Revised: 10/31/2018 Document Reviewed: 10/31/2018  [employer] Interactive Patient Education © 2019 [employer]

## 2020-02-05 NOTE — PROGRESS NOTES
Problem list     Subjective   Jessica Bravo is a 54 y.o. female     Chief Complaint   Patient presents with   • Hypertension     Here for a follow up    Problem list  1.  Chest pain  1.1 nuclear stress test April 2018 with no evidence of ischemia  2.  Preserved systolic function  3.  Insulin-dependent diabetes mellitus  4.  Dyslipidemia  5.  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  She is doing well.  She does not experience chest pain or pressure.  She has mild stable levels of exertional dyspnea.  She does not complain of failure symptoms.    She does palpitate on occasion.  She does not describe dizziness, presyncope or syncope.  It only happens on occasion.  She does have severe kidney disease and is followed by nephrology.  For now, she is doing well from a cardiac standpoint.      Current Outpatient Medications on File Prior to Visit   Medication Sig Dispense Refill   • furosemide (LASIX) 80 MG tablet Take 80 mg by mouth 2 (Two) Times a Day.     • gabapentin (NEURONTIN) 800 MG tablet Take 800 mg by mouth. Bid - qid     • insulin glargine (LANTUS) 100 UNIT/ML injection Inject 34 Units under the skin 2 (Two) Times a Day.     • nitroglycerin (NITROSTAT) 0.4 MG SL tablet 1 under the tongue as needed for angina, may repeat q5mins for up three doses 100 tablet 11   • aspirin 81 MG tablet Take 1 tablet by mouth Daily. 30 tablet 11   • clopidogrel (PLAVIX) 75 MG tablet Take 1 tablet by mouth Daily. 30 tablet 11   • isosorbide mononitrate (IMDUR) 30 MG 24 hr tablet Take 1 tablet by mouth Every Morning. 30 tablet 11     No current facility-administered medications on file prior to visit.        Bactrim [sulfamethoxazole-trimethoprim]; Sulfa antibiotics; Benadryl [diphenhydramine hcl (sleep)]; and Penicillins    Past Medical History:   Diagnosis Date   • Arrhythmia    • Chronic kidney disease     only has one kidney, has been in hopsital 3 times  since last visit    • Diabetes mellitus ([facility])        Social History     Socioeconomic History   • Marital status: Single     Spouse name: Not on file   • Number of children: Not on file   • Years of education: Not on file   • Highest education level: Not on file   Tobacco Use   • Smoking status: Never Smoker   • Smokeless tobacco: Never Used   Substance and Sexual Activity   • Alcohol use: No   • Drug use: No   • Sexual activity: Defer       Family History   Problem Relation Age of Onset   • Diabetes Mother    • Hypertension Mother    • Diabetes Father    • Hypertension Father    • Diabetes Sister    • Hypertension Sister    • Diabetes Brother        Review of Systems   Constitutional: Negative for activity change, appetite change and fatigue.   HENT: Negative for facial swelling and trouble swallowing.    Eyes: Negative for visual disturbance.   Respiratory: Positive for shortness of breath (with and without exertion). Negative for apnea and chest tightness.    Cardiovascular: Positive for palpitations (r/t heart murmur). Negative for chest pain and leg swelling.   Gastrointestinal: Negative for abdominal distention, abdominal pain, constipation and nausea.   Endocrine: Negative for cold intolerance and heat intolerance.   Genitourinary: Negative.    Musculoskeletal: Positive for back pain and myalgias. Negative for arthralgias, gait problem and joint swelling.   Skin: Negative for color change and rash.   Allergic/Immunologic: Negative for environmental allergies and food allergies.   Neurological: Positive for numbness (hands). Negative for dizziness, syncope, weakness and light-headedness.   Hematological: Does not bruise/bleed easily.   Psychiatric/Behavioral: Positive for agitation and dysphoric mood. Negative for sleep disturbance and suicidal ideas. The patient is nervous/anxious.    All other systems reviewed and are negative.      Objective   Vitals:    02/05/20 0854   BP: 143/87   BP Location: Left  "arm   Patient Position: Sitting   Pulse: 87   Resp: 16   SpO2: 98%   Weight: 78 kg (172 lb)   Height: 162.6 cm (64\")      /87 (BP Location: Left arm, Patient Position: Sitting)   Pulse 87   Resp 16   Ht 162.6 cm (64\")   Wt 78 kg (172 lb)   SpO2 98%   BMI 29.52 kg/m²     Lab Results (most recent)     None          Physical Exam   Constitutional: She is oriented to person, place, and time. She appears well-developed and well-nourished. No distress.   HENT:   Head: Normocephalic and atraumatic.   Eyes: Conjunctivae are normal. Right eye exhibits no discharge. Left eye exhibits no discharge. No scleral icterus.   Neck: No JVD present.   Cardiovascular: Normal rate, regular rhythm and normal heart sounds. Exam reveals no gallop and no friction rub.   No murmur heard.  Pulmonary/Chest: Effort normal and breath sounds normal. No respiratory distress. She has no wheezes. She has no rales. She exhibits no tenderness.   Musculoskeletal: Normal range of motion. She exhibits no edema.   Neurological: She is alert and oriented to person, place, and time. No cranial nerve deficit.   Skin: Skin is warm and dry. No rash noted. No erythema. No pallor.   Psychiatric: She has a normal mood and affect. Her behavior is normal.   Nursing note and vitals reviewed.      Procedure     ECG 12 Lead  Date/Time: 2/5/2020 9:02 AM  Performed by: Darrick Zeng PA  Authorized by: Darrick Zeng PA   Comparison: compared with previous ECG from 3/9/2017  Comments: EKG demonstrates sinus rhythm at 81 bpm with no acute ST changes               Assessment/Plan     Problems Addressed this Visit        Cardiovascular and Mediastinum    Palpitations - Primary    Relevant Orders    ECG 12 Lead       Respiratory    Shortness of breath       Other    Lower extremity edema            Recommendation  1.  Patient with complaints of palpitations.  He only happens on occasion.  She has no symptoms of cerebral Bolick events.  We will continue " to monitor for now.  2.  Dyspnea is mild.  She is on Lasix therapy because of edema.  She is followed by nephrology.  Apparently has severe kidney disease and they are monitoring.  3.  From our standpoint.  Nothing further.  We will see her back in a year or sooner symptoms discussed.  She will follow with primary scheduled         Jessica Bravo  reports that she has never smoked. She has never used smokeless tobacco.      Patient's Body mass index is 29.52 kg/m². BMI is within normal parameters. No follow-up required..       Electronically signed by:

## 2021-10-09 ENCOUNTER — HOSPITAL ENCOUNTER (INPATIENT)
Facility: HOSPITAL | Age: 56
LOS: 5 days | Discharge: HOME | End: 2021-10-15
Attending: EMERGENCY MEDICINE

## 2021-10-09 ENCOUNTER — APPOINTMENT (OUTPATIENT)
Dept: GENERAL RADIOLOGY | Facility: HOSPITAL | Age: 56
End: 2021-10-09

## 2021-10-09 ENCOUNTER — APPOINTMENT (OUTPATIENT)
Dept: CT IMAGING | Facility: HOSPITAL | Age: 56
End: 2021-10-09

## 2021-10-09 ENCOUNTER — APPOINTMENT (OUTPATIENT)
Dept: NUCLEAR MEDICINE | Facility: HOSPITAL | Age: 56
End: 2021-10-09

## 2021-10-09 DIAGNOSIS — R09.02 HYPOXEMIA: ICD-10-CM

## 2021-10-09 DIAGNOSIS — J18.9 PNEUMONIA OF BOTH LOWER LOBES DUE TO INFECTIOUS ORGANISM: Primary | ICD-10-CM

## 2021-10-09 DIAGNOSIS — R73.9 HYPERGLYCEMIA: ICD-10-CM

## 2021-10-09 LAB
A-A DO2: 146.1 MMHG (ref 0–300)
A-A DO2: 60.9 MMHG (ref 0–300)
ACETONE BLD QL: NEGATIVE
ALBUMIN SERPL-MCNC: 3.19 G/DL (ref 3.5–5.2)
ALBUMIN/GLOB SERPL: 0.7 G/DL
ALP SERPL-CCNC: 92 U/L (ref 39–117)
ALT SERPL W P-5'-P-CCNC: 13 U/L (ref 1–33)
ANION GAP SERPL CALCULATED.3IONS-SCNC: 15.7 MMOL/L (ref 5–15)
ANION GAP SERPL CALCULATED.3IONS-SCNC: 21.2 MMOL/L (ref 5–15)
ARTERIAL PATENCY WRIST A: POSITIVE
ARTERIAL PATENCY WRIST A: POSITIVE
AST SERPL-CCNC: 16 U/L (ref 1–32)
ATMOSPHERIC PRESS: 727 MMHG
ATMOSPHERIC PRESS: 727 MMHG
BACTERIA UR QL AUTO: ABNORMAL /HPF
BASE EXCESS BLDA CALC-SCNC: -3.6 MMOL/L (ref 0–2)
BASE EXCESS BLDA CALC-SCNC: -3.8 MMOL/L (ref 0–2)
BDY SITE: ABNORMAL
BDY SITE: ABNORMAL
BILIRUB SERPL-MCNC: 0.5 MG/DL (ref 0–1.2)
BILIRUB UR QL STRIP: NEGATIVE
BODY TEMPERATURE: 0 C
BODY TEMPERATURE: 0 C
BUN SERPL-MCNC: 61 MG/DL (ref 6–20)
BUN SERPL-MCNC: 61 MG/DL (ref 6–20)
BUN/CREAT SERPL: 15.6 (ref 7–25)
BUN/CREAT SERPL: 17 (ref 7–25)
CALCIUM SPEC-SCNC: 8.3 MG/DL (ref 8.6–10.5)
CALCIUM SPEC-SCNC: 8.6 MG/DL (ref 8.6–10.5)
CHLORIDE SERPL-SCNC: 87 MMOL/L (ref 98–107)
CHLORIDE SERPL-SCNC: 95 MMOL/L (ref 98–107)
CLARITY UR: ABNORMAL
CO2 BLDA-SCNC: 21.6 MMOL/L (ref 22–33)
CO2 BLDA-SCNC: 22.5 MMOL/L (ref 22–33)
CO2 SERPL-SCNC: 19.8 MMOL/L (ref 22–29)
CO2 SERPL-SCNC: 20.3 MMOL/L (ref 22–29)
COHGB MFR BLD: 1.1 % (ref 0–5)
COHGB MFR BLD: 1.3 % (ref 0–5)
COLOR UR: YELLOW
CREAT SERPL-MCNC: 3.59 MG/DL (ref 0.57–1)
CREAT SERPL-MCNC: 3.9 MG/DL (ref 0.57–1)
CRP SERPL-MCNC: 29.13 MG/DL (ref 0–0.5)
D DIMER PPP FEU-MCNC: 2.82 MCGFEU/ML (ref 0–0.5)
D DIMER PPP FEU-MCNC: 3.09 MCGFEU/ML (ref 0–0.5)
D-LACTATE SERPL-SCNC: 2.7 MMOL/L (ref 0.5–2)
D-LACTATE SERPL-SCNC: 2.8 MMOL/L (ref 0.5–2)
DEPRECATED RDW RBC AUTO: 42 FL (ref 37–54)
ERYTHROCYTE [DISTWIDTH] IN BLOOD BY AUTOMATED COUNT: 13.4 % (ref 12.3–15.4)
FERRITIN SERPL-MCNC: 533 NG/ML (ref 13–150)
FLUAV RNA RESP QL NAA+PROBE: NOT DETECTED
FLUBV RNA RESP QL NAA+PROBE: NOT DETECTED
GAS FLOW AIRWAY: 4 LPM
GFR SERPL CREATININE-BSD FRML MDRD: 12 ML/MIN/1.73
GFR SERPL CREATININE-BSD FRML MDRD: 13 ML/MIN/1.73
GFR SERPL CREATININE-BSD FRML MDRD: ABNORMAL ML/MIN/{1.73_M2}
GFR SERPL CREATININE-BSD FRML MDRD: ABNORMAL ML/MIN/{1.73_M2}
GLOBULIN UR ELPH-MCNC: 4.8 GM/DL
GLUCOSE BLDC GLUCOMTR-MCNC: 416 MG/DL (ref 70–130)
GLUCOSE BLDC GLUCOMTR-MCNC: 436 MG/DL (ref 70–130)
GLUCOSE BLDC GLUCOMTR-MCNC: 495 MG/DL (ref 70–130)
GLUCOSE SERPL-MCNC: 425 MG/DL (ref 65–99)
GLUCOSE SERPL-MCNC: 534 MG/DL (ref 65–99)
GLUCOSE UR STRIP-MCNC: ABNORMAL MG/DL
HCO3 BLDA-SCNC: 20.6 MMOL/L (ref 20–26)
HCO3 BLDA-SCNC: 21.3 MMOL/L (ref 20–26)
HCT VFR BLD AUTO: 37.7 % (ref 34–46.6)
HCT VFR BLD CALC: 32.4 % (ref 38–51)
HCT VFR BLD CALC: 35 % (ref 38–51)
HGB BLD-MCNC: 11.8 G/DL (ref 12–15.9)
HGB BLDA-MCNC: 10.6 G/DL (ref 13.5–17.5)
HGB BLDA-MCNC: 11.4 G/DL (ref 13.5–17.5)
HGB UR QL STRIP.AUTO: ABNORMAL
HYALINE CASTS UR QL AUTO: ABNORMAL /LPF
HYPOCHROMIA BLD QL: ABNORMAL
INHALED O2 CONCENTRATION: 21 %
INHALED O2 CONCENTRATION: 36 %
KETONES UR QL STRIP: NEGATIVE
LDH SERPL-CCNC: 296 U/L (ref 135–214)
LEUKOCYTE ESTERASE UR QL STRIP.AUTO: ABNORMAL
LYMPHOCYTES # BLD MANUAL: 0.51 10*3/MM3 (ref 0.7–3.1)
LYMPHOCYTES NFR BLD MANUAL: 2 % (ref 5–12)
LYMPHOCYTES NFR BLD MANUAL: 5 % (ref 19.6–45.3)
Lab: ABNORMAL
MAGNESIUM SERPL-MCNC: 2.1 MG/DL (ref 1.6–2.6)
MCH RBC QN AUTO: 26.6 PG (ref 26.6–33)
MCHC RBC AUTO-ENTMCNC: 31.3 G/DL (ref 31.5–35.7)
MCV RBC AUTO: 84.9 FL (ref 79–97)
METHGB BLD QL: 0.2 % (ref 0–3)
METHGB BLD QL: 0.2 % (ref 0–3)
MODALITY: ABNORMAL
MODALITY: ABNORMAL
MONOCYTES # BLD AUTO: 0.2 10*3/MM3 (ref 0.1–0.9)
NEUTROPHILS # BLD AUTO: 9.44 10*3/MM3 (ref 1.7–7)
NEUTROPHILS NFR BLD MANUAL: 93 % (ref 42.7–76)
NITRITE UR QL STRIP: NEGATIVE
NOTE: ABNORMAL
NOTE: ABNORMAL
NOTIFIED BY: ABNORMAL
NOTIFIED BY: ABNORMAL
NOTIFIED WHO: ABNORMAL
NOTIFIED WHO: ABNORMAL
NT-PROBNP SERPL-MCNC: 581.1 PG/ML (ref 0–900)
OXYHGB MFR BLDV: 76.8 % (ref 94–99)
OXYHGB MFR BLDV: 87.9 % (ref 94–99)
PCO2 BLDA: 34.4 MM HG (ref 35–45)
PCO2 BLDA: 37.1 MM HG (ref 35–45)
PCO2 TEMP ADJ BLD: ABNORMAL MM[HG]
PCO2 TEMP ADJ BLD: ABNORMAL MM[HG]
PH BLDA: 7.37 PH UNITS (ref 7.35–7.45)
PH BLDA: 7.39 PH UNITS (ref 7.35–7.45)
PH UR STRIP.AUTO: 8 [PH] (ref 5–8)
PH, TEMP CORRECTED: ABNORMAL
PH, TEMP CORRECTED: ABNORMAL
PLAT MORPH BLD: NORMAL
PLATELET # BLD AUTO: 365 10*3/MM3 (ref 140–450)
PMV BLD AUTO: 10.4 FL (ref 6–12)
PO2 BLDA: 43.1 MM HG (ref 83–108)
PO2 BLDA: 57.6 MM HG (ref 83–108)
PO2 TEMP ADJ BLD: ABNORMAL MM[HG]
PO2 TEMP ADJ BLD: ABNORMAL MM[HG]
POTASSIUM SERPL-SCNC: 3.8 MMOL/L (ref 3.5–5.2)
POTASSIUM SERPL-SCNC: 4.1 MMOL/L (ref 3.5–5.2)
PROT SERPL-MCNC: 8 G/DL (ref 6–8.5)
PROT UR QL STRIP: ABNORMAL
RBC # BLD AUTO: 4.44 10*6/MM3 (ref 3.77–5.28)
RBC # UR: ABNORMAL /HPF
REF LAB TEST METHOD: ABNORMAL
SAO2 % BLDCOA: 78 % (ref 94–99)
SAO2 % BLDCOA: 89.1 % (ref 94–99)
SARS-COV-2 RNA RESP QL NAA+PROBE: DETECTED
SCAN SLIDE: NORMAL
SODIUM SERPL-SCNC: 128 MMOL/L (ref 136–145)
SODIUM SERPL-SCNC: 131 MMOL/L (ref 136–145)
SP GR UR STRIP: 1.02 (ref 1–1.03)
SQUAMOUS #/AREA URNS HPF: ABNORMAL /HPF
TROPONIN T SERPL-MCNC: 0.02 NG/ML (ref 0–0.03)
UROBILINOGEN UR QL STRIP: ABNORMAL
VENTILATOR MODE: ABNORMAL
VENTILATOR MODE: ABNORMAL
WBC # BLD AUTO: 10.15 10*3/MM3 (ref 3.4–10.8)
WBC UR QL AUTO: ABNORMAL /HPF
YEAST URNS QL MICRO: ABNORMAL /HPF

## 2021-10-09 PROCEDURE — 83615 LACTATE (LD) (LDH) ENZYME: CPT | Performed by: EMERGENCY MEDICINE

## 2021-10-09 PROCEDURE — 25010000002 CEFEPIME PER 500 MG: Performed by: EMERGENCY MEDICINE

## 2021-10-09 PROCEDURE — 82805 BLOOD GASES W/O2 SATURATION: CPT

## 2021-10-09 PROCEDURE — 86140 C-REACTIVE PROTEIN: CPT | Performed by: EMERGENCY MEDICINE

## 2021-10-09 PROCEDURE — 85007 BL SMEAR W/DIFF WBC COUNT: CPT | Performed by: EMERGENCY MEDICINE

## 2021-10-09 PROCEDURE — 83605 ASSAY OF LACTIC ACID: CPT | Performed by: EMERGENCY MEDICINE

## 2021-10-09 PROCEDURE — 25010000002 DEXAMETHASONE PER 1 MG: Performed by: EMERGENCY MEDICINE

## 2021-10-09 PROCEDURE — 83735 ASSAY OF MAGNESIUM: CPT | Performed by: EMERGENCY MEDICINE

## 2021-10-09 PROCEDURE — 83050 HGB METHEMOGLOBIN QUAN: CPT

## 2021-10-09 PROCEDURE — 87040 BLOOD CULTURE FOR BACTERIA: CPT | Performed by: EMERGENCY MEDICINE

## 2021-10-09 PROCEDURE — 71250 CT THORAX DX C-: CPT

## 2021-10-09 PROCEDURE — 78580 LUNG PERFUSION IMAGING: CPT

## 2021-10-09 PROCEDURE — 94640 AIRWAY INHALATION TREATMENT: CPT

## 2021-10-09 PROCEDURE — 87086 URINE CULTURE/COLONY COUNT: CPT | Performed by: EMERGENCY MEDICINE

## 2021-10-09 PROCEDURE — 36415 COLL VENOUS BLD VENIPUNCTURE: CPT

## 2021-10-09 PROCEDURE — 85379 FIBRIN DEGRADATION QUANT: CPT | Performed by: EMERGENCY MEDICINE

## 2021-10-09 PROCEDURE — 0 TECHNETIUM ALBUMIN AGGREGATED: Performed by: EMERGENCY MEDICINE

## 2021-10-09 PROCEDURE — 84443 ASSAY THYROID STIM HORMONE: CPT | Performed by: INTERNAL MEDICINE

## 2021-10-09 PROCEDURE — 36600 WITHDRAWAL OF ARTERIAL BLOOD: CPT

## 2021-10-09 PROCEDURE — 99285 EMERGENCY DEPT VISIT HI MDM: CPT

## 2021-10-09 PROCEDURE — 84484 ASSAY OF TROPONIN QUANT: CPT | Performed by: EMERGENCY MEDICINE

## 2021-10-09 PROCEDURE — 81001 URINALYSIS AUTO W/SCOPE: CPT | Performed by: EMERGENCY MEDICINE

## 2021-10-09 PROCEDURE — 83880 ASSAY OF NATRIURETIC PEPTIDE: CPT | Performed by: EMERGENCY MEDICINE

## 2021-10-09 PROCEDURE — 85025 COMPLETE CBC W/AUTO DIFF WBC: CPT | Performed by: EMERGENCY MEDICINE

## 2021-10-09 PROCEDURE — 82009 KETONE BODYS QUAL: CPT | Performed by: EMERGENCY MEDICINE

## 2021-10-09 PROCEDURE — 82728 ASSAY OF FERRITIN: CPT | Performed by: EMERGENCY MEDICINE

## 2021-10-09 PROCEDURE — 87636 SARSCOV2 & INF A&B AMP PRB: CPT | Performed by: EMERGENCY MEDICINE

## 2021-10-09 PROCEDURE — 94799 UNLISTED PULMONARY SVC/PX: CPT

## 2021-10-09 PROCEDURE — 71045 X-RAY EXAM CHEST 1 VIEW: CPT

## 2021-10-09 PROCEDURE — 83036 HEMOGLOBIN GLYCOSYLATED A1C: CPT | Performed by: INTERNAL MEDICINE

## 2021-10-09 PROCEDURE — 80053 COMPREHEN METABOLIC PANEL: CPT | Performed by: EMERGENCY MEDICINE

## 2021-10-09 PROCEDURE — 82375 ASSAY CARBOXYHB QUANT: CPT

## 2021-10-09 PROCEDURE — 63710000001 INSULIN REGULAR HUMAN PER 5 UNITS: Performed by: EMERGENCY MEDICINE

## 2021-10-09 PROCEDURE — 84100 ASSAY OF PHOSPHORUS: CPT | Performed by: INTERNAL MEDICINE

## 2021-10-09 PROCEDURE — 82962 GLUCOSE BLOOD TEST: CPT

## 2021-10-09 PROCEDURE — A9540 TC99M MAA: HCPCS | Performed by: EMERGENCY MEDICINE

## 2021-10-09 RX ORDER — DEXAMETHASONE SODIUM PHOSPHATE 10 MG/ML
6 INJECTION INTRAMUSCULAR; INTRAVENOUS ONCE
Status: COMPLETED | OUTPATIENT
Start: 2021-10-09 | End: 2021-10-09

## 2021-10-09 RX ORDER — ALBUTEROL SULFATE 90 UG/1
2 AEROSOL, METERED RESPIRATORY (INHALATION) ONCE
Status: COMPLETED | OUTPATIENT
Start: 2021-10-09 | End: 2021-10-09

## 2021-10-09 RX ADMIN — HUMAN INSULIN 8 UNITS: 100 INJECTION, SOLUTION SUBCUTANEOUS at 15:26

## 2021-10-09 RX ADMIN — DEXAMETHASONE SODIUM PHOSPHATE 6 MG: 10 INJECTION INTRAMUSCULAR; INTRAVENOUS at 18:53

## 2021-10-09 RX ADMIN — CEFEPIME HYDROCHLORIDE 2 G: 2 INJECTION, POWDER, FOR SOLUTION INTRAVENOUS at 20:00

## 2021-10-09 RX ADMIN — ALBUTEROL SULFATE 2 PUFF: 90 AEROSOL, METERED RESPIRATORY (INHALATION) at 19:10

## 2021-10-09 RX ADMIN — DOXYCYCLINE 100 MG: 100 INJECTION, POWDER, LYOPHILIZED, FOR SOLUTION INTRAVENOUS at 20:31

## 2021-10-09 RX ADMIN — HUMAN INSULIN 6 UNITS: 100 INJECTION, SOLUTION SUBCUTANEOUS at 18:56

## 2021-10-09 RX ADMIN — METRONIDAZOLE 500 MG: 500 INJECTION, SOLUTION INTRAVENOUS at 21:32

## 2021-10-09 RX ADMIN — SODIUM CHLORIDE 500 ML: 9 INJECTION, SOLUTION INTRAVENOUS at 23:05

## 2021-10-09 RX ADMIN — HUMAN INSULIN 8 UNITS: 100 INJECTION, SOLUTION SUBCUTANEOUS at 23:06

## 2021-10-09 RX ADMIN — KIT FOR THE PREPARATION OF TECHNETIUM TC 99M ALBUMIN AGGREGATED 1 DOSE: 2.5 INJECTION, POWDER, FOR SOLUTION INTRAVENOUS at 18:52

## 2021-10-09 RX ADMIN — SODIUM CHLORIDE 1000 ML: 9 INJECTION, SOLUTION INTRAVENOUS at 15:23

## 2021-10-09 NOTE — ED NOTES
Provided patient with BSC, urine specimen cup, ray wipes. Instructed patient regarding need for urine specimen.      Farhana Sagastume RN  10/09/21 1947

## 2021-10-09 NOTE — ED PROVIDER NOTES
Subjective   Diagnosed with Covid-19 3 weeks ago, continues to feel ill, and incrasingly short of breath.      Shortness of Breath  Severity:  Severe  Onset quality:  Gradual  Timing:  Constant  Progression:  Worsening  Chronicity:  Chronic  Context: activity    Context comment:  Covid-19  Relieved by:  Nothing  Worsened by:  Exertion and movement  Ineffective treatments:  None tried  Associated symptoms: cough and wheezing        Review of Systems   Constitutional: Positive for activity change and fatigue.   HENT: Negative.    Eyes: Negative.    Respiratory: Positive for cough, shortness of breath and wheezing.    Cardiovascular: Positive for palpitations.   Gastrointestinal: Negative.    Endocrine: Negative.    Genitourinary: Negative.    Musculoskeletal: Negative.    Skin: Negative.    Allergic/Immunologic: Negative.    Neurological: Negative.    Hematological: Negative.    Psychiatric/Behavioral: Negative.        Past Medical History:   Diagnosis Date   • Arrhythmia    • Chronic kidney disease     only has one kidney, has been in hoptal 3 times since last visit    • Diabetes mellitus (HCC)        Allergies   Allergen Reactions   • Bactrim [Sulfamethoxazole-Trimethoprim] Anaphylaxis   • Sulfa Antibiotics Anaphylaxis   • Benadryl [Diphenhydramine Hcl (Sleep)]    • Penicillins        Past Surgical History:   Procedure Laterality Date   • HYSTERECTOMY         Family History   Problem Relation Age of Onset   • Diabetes Mother    • Hypertension Mother    • Diabetes Father    • Hypertension Father    • Diabetes Sister    • Hypertension Sister    • Diabetes Brother        Social History     Socioeconomic History   • Marital status: Single   Tobacco Use   • Smoking status: Never Smoker   • Smokeless tobacco: Never Used   Substance and Sexual Activity   • Alcohol use: No   • Drug use: No   • Sexual activity: Defer           Objective   Physical Exam  Vitals and nursing note reviewed.   Constitutional:       General: She  is in acute distress.      Appearance: She is ill-appearing.   HENT:      Head: Normocephalic.      Nose: Nose normal.      Mouth/Throat:      Mouth: Mucous membranes are moist.   Eyes:      Pupils: Pupils are equal, round, and reactive to light.   Cardiovascular:      Rate and Rhythm: Normal rate.      Pulses: Normal pulses.   Pulmonary:      Effort: Respiratory distress present.      Breath sounds: Wheezing, rhonchi and rales present.   Abdominal:      General: Abdomen is flat.   Musculoskeletal:         General: Normal range of motion.      Cervical back: Normal range of motion.   Skin:     Capillary Refill: Capillary refill takes 2 to 3 seconds.      Coloration: Skin is pale.   Neurological:      General: No focal deficit present.      Mental Status: She is alert.   Psychiatric:         Mood and Affect: Mood normal.         Procedures           ED Course  ED Course as of 10/10/21 0058   Sat Oct 09, 2021   1856 CT chest :extensive bilat pneumonia consistent with covid-19 pnemonia [CLARE]   2152 NM scan : low probability of PE [CLARE]   2154 Patient is on 4 LPM nasal canula, and her oxygen sat. Is 95% [CLARE]      ED Course User Index  [CLARE] Cabrera Johansen MD                                           [facility]    Final diagnoses:   Pneumonia of both lower lobes due to infectious organism   Hyperglycemia       ED Disposition  ED Disposition     ED Disposition Condition Comment    Decision to Admit            No follow-up provider specified.       Medication List      No changes were made to your prescriptions during this visit.          Cabrera Johansen MD  10/09/21 2155       Cabrera Johansen MD  10/10/21 0058

## 2021-10-10 ENCOUNTER — APPOINTMENT (OUTPATIENT)
Dept: ULTRASOUND IMAGING | Facility: HOSPITAL | Age: 56
End: 2021-10-10

## 2021-10-10 PROBLEM — R00.2 PALPITATIONS: Status: RESOLVED | Noted: 2017-03-09 | Resolved: 2021-10-10

## 2021-10-10 PROBLEM — J18.9 PNEUMONIA OF BOTH LOWER LOBES DUE TO INFECTIOUS ORGANISM: Status: ACTIVE | Noted: 2021-10-10

## 2021-10-10 PROBLEM — I10 ESSENTIAL HYPERTENSION: Chronic | Status: ACTIVE | Noted: 2018-10-18

## 2021-10-10 PROBLEM — R53.1 WEAKNESS: Status: RESOLVED | Noted: 2018-09-25 | Resolved: 2021-10-10

## 2021-10-10 PROBLEM — R07.9 CHEST PAIN: Status: RESOLVED | Noted: 2017-03-09 | Resolved: 2021-10-10

## 2021-10-10 PROBLEM — R06.02 SHORTNESS OF BREATH: Status: RESOLVED | Noted: 2017-03-09 | Resolved: 2021-10-10

## 2021-10-10 LAB
ALBUMIN SERPL-MCNC: 2.77 G/DL (ref 3.5–5.2)
ALBUMIN/GLOB SERPL: 0.7 G/DL
ALP SERPL-CCNC: 74 U/L (ref 39–117)
ALT SERPL W P-5'-P-CCNC: 11 U/L (ref 1–33)
ANION GAP SERPL CALCULATED.3IONS-SCNC: 18.4 MMOL/L (ref 5–15)
AST SERPL-CCNC: 14 U/L (ref 1–32)
ATMOSPHERIC PRESS: 727 MMHG
BACTERIA SPEC AEROBE CULT: NORMAL
BASE EXCESS BLDV CALC-SCNC: -6.5 MMOL/L (ref 0–2)
BDY SITE: ABNORMAL
BILIRUB SERPL-MCNC: 0.4 MG/DL (ref 0–1.2)
BODY TEMPERATURE: 37 C
BUN SERPL-MCNC: 62 MG/DL (ref 6–20)
BUN/CREAT SERPL: 19.5 (ref 7–25)
CALCIUM SPEC-SCNC: 8.6 MG/DL (ref 8.6–10.5)
CHLORIDE SERPL-SCNC: 92 MMOL/L (ref 98–107)
CHLORIDE UR-SCNC: 23 MMOL/L
CO2 BLDA-SCNC: 21.1 MMOL/L (ref 22–33)
CO2 SERPL-SCNC: 16.6 MMOL/L (ref 22–29)
COHGB MFR BLD: 1.1 % (ref 0–5)
CREAT SERPL-MCNC: 3.18 MG/DL (ref 0.57–1)
D-LACTATE SERPL-SCNC: 1.7 MMOL/L (ref 0.5–2)
DEPRECATED RDW RBC AUTO: 42.1 FL (ref 37–54)
ERYTHROCYTE [DISTWIDTH] IN BLOOD BY AUTOMATED COUNT: 13.4 % (ref 12.3–15.4)
GAS FLOW AIRWAY: 5 LPM
GFR SERPL CREATININE-BSD FRML MDRD: 15 ML/MIN/1.73
GLOBULIN UR ELPH-MCNC: 4.2 GM/DL
GLUCOSE BLDC GLUCOMTR-MCNC: 364 MG/DL (ref 70–130)
GLUCOSE BLDC GLUCOMTR-MCNC: 368 MG/DL (ref 70–130)
GLUCOSE BLDC GLUCOMTR-MCNC: 377 MG/DL (ref 70–130)
GLUCOSE BLDC GLUCOMTR-MCNC: 383 MG/DL (ref 70–130)
GLUCOSE BLDC GLUCOMTR-MCNC: 384 MG/DL (ref 70–130)
GLUCOSE BLDC GLUCOMTR-MCNC: 437 MG/DL (ref 70–130)
GLUCOSE BLDC GLUCOMTR-MCNC: 470 MG/DL (ref 70–130)
GLUCOSE SERPL-MCNC: 425 MG/DL (ref 65–99)
HBA1C MFR BLD: 10.5 % (ref 4.8–5.6)
HCO3 BLDV-SCNC: 19.8 MMOL/L (ref 22–28)
HCT VFR BLD AUTO: 33.7 % (ref 34–46.6)
HGB BLD-MCNC: 10.5 G/DL (ref 12–15.9)
HGB BLDA-MCNC: 10.9 G/DL (ref 13.5–17.5)
HYPOCHROMIA BLD QL: ABNORMAL
INHALED O2 CONCENTRATION: 40 %
LYMPHOCYTES # BLD MANUAL: 0.53 10*3/MM3 (ref 0.7–3.1)
LYMPHOCYTES NFR BLD MANUAL: 2 % (ref 5–12)
LYMPHOCYTES NFR BLD MANUAL: 7 % (ref 19.6–45.3)
Lab: ABNORMAL
M PNEUMO IGM SER QL: NEGATIVE
MAGNESIUM SERPL-MCNC: 2.1 MG/DL (ref 1.6–2.6)
MCH RBC QN AUTO: 26.4 PG (ref 26.6–33)
MCHC RBC AUTO-ENTMCNC: 31.2 G/DL (ref 31.5–35.7)
MCV RBC AUTO: 84.9 FL (ref 79–97)
METAMYELOCYTES NFR BLD MANUAL: 1 % (ref 0–0)
METHGB BLD QL: 0.5 % (ref 0–3)
MODALITY: ABNORMAL
MONOCYTES # BLD AUTO: 0.15 10*3/MM3 (ref 0.1–0.9)
NEUTROPHILS # BLD AUTO: 6.8 10*3/MM3 (ref 1.7–7)
NEUTROPHILS NFR BLD MANUAL: 88 % (ref 42.7–76)
NEUTS BAND NFR BLD MANUAL: 2 % (ref 0–5)
OXYHGB MFR BLDV: 34.3 % (ref 45–75)
PCO2 BLDV: 41.7 MM HG (ref 41–51)
PH BLDV: 7.29 PH UNITS (ref 7.32–7.42)
PHOSPHATE SERPL-MCNC: 3.4 MG/DL (ref 2.5–4.5)
PHOSPHATE SERPL-MCNC: 4.1 MG/DL (ref 2.5–4.5)
PLAT MORPH BLD: NORMAL
PLATELET # BLD AUTO: 326 10*3/MM3 (ref 140–450)
PMV BLD AUTO: 9.9 FL (ref 6–12)
PO2 BLDV: 23.1 MM HG (ref 27–53)
POTASSIUM SERPL-SCNC: 4 MMOL/L (ref 3.5–5.2)
POTASSIUM UR-SCNC: 10.6 MMOL/L
PROCALCITONIN SERPL-MCNC: 0.61 NG/ML (ref 0–0.25)
PROT SERPL-MCNC: 7 G/DL (ref 6–8.5)
QT INTERVAL: 378 MS
QTC INTERVAL: 439 MS
RBC # BLD AUTO: 3.97 10*6/MM3 (ref 3.77–5.28)
S PNEUM AG SPEC QL LA: NEGATIVE
SAO2 % BLDCOV: 34.9 % (ref 45–75)
SODIUM SERPL-SCNC: 127 MMOL/L (ref 136–145)
SODIUM UR-SCNC: 49 MMOL/L
TROPONIN T SERPL-MCNC: <0.01 NG/ML (ref 0–0.03)
TSH SERPL DL<=0.05 MIU/L-ACNC: 0.4 UIU/ML (ref 0.27–4.2)
VENTILATOR MODE: ABNORMAL
WBC # BLD AUTO: 7.55 10*3/MM3 (ref 3.4–10.8)

## 2021-10-10 PROCEDURE — 94760 N-INVAS EAR/PLS OXIMETRY 1: CPT

## 2021-10-10 PROCEDURE — 76775 US EXAM ABDO BACK WALL LIM: CPT | Performed by: RADIOLOGY

## 2021-10-10 PROCEDURE — 93005 ELECTROCARDIOGRAM TRACING: CPT | Performed by: INTERNAL MEDICINE

## 2021-10-10 PROCEDURE — 84300 ASSAY OF URINE SODIUM: CPT | Performed by: INTERNAL MEDICINE

## 2021-10-10 PROCEDURE — 94799 UNLISTED PULMONARY SVC/PX: CPT

## 2021-10-10 PROCEDURE — 82570 ASSAY OF URINE CREATININE: CPT | Performed by: INTERNAL MEDICINE

## 2021-10-10 PROCEDURE — 84145 PROCALCITONIN (PCT): CPT | Performed by: INTERNAL MEDICINE

## 2021-10-10 PROCEDURE — 63710000001 INSULIN REGULAR HUMAN PER 5 UNITS: Performed by: INTERNAL MEDICINE

## 2021-10-10 PROCEDURE — 93010 ELECTROCARDIOGRAM REPORT: CPT | Performed by: INTERNAL MEDICINE

## 2021-10-10 PROCEDURE — 25010000002 ALBUMIN HUMAN 25% PER 50 ML: Performed by: INTERNAL MEDICINE

## 2021-10-10 PROCEDURE — 82962 GLUCOSE BLOOD TEST: CPT

## 2021-10-10 PROCEDURE — 83605 ASSAY OF LACTIC ACID: CPT | Performed by: INTERNAL MEDICINE

## 2021-10-10 PROCEDURE — 99223 1ST HOSP IP/OBS HIGH 75: CPT | Performed by: INTERNAL MEDICINE

## 2021-10-10 PROCEDURE — 80053 COMPREHEN METABOLIC PANEL: CPT | Performed by: INTERNAL MEDICINE

## 2021-10-10 PROCEDURE — 25010000002 ENOXAPARIN PER 10 MG: Performed by: INTERNAL MEDICINE

## 2021-10-10 PROCEDURE — 82805 BLOOD GASES W/O2 SATURATION: CPT

## 2021-10-10 PROCEDURE — 63710000001 INSULIN DETEMIR PER 5 UNITS: Performed by: INTERNAL MEDICINE

## 2021-10-10 PROCEDURE — 63710000001 INSULIN ASPART PER 5 UNITS: Performed by: PHYSICIAN ASSISTANT

## 2021-10-10 PROCEDURE — 82820 HEMOGLOBIN-OXYGEN AFFINITY: CPT

## 2021-10-10 PROCEDURE — 63710000001 INSULIN ASPART PER 5 UNITS: Performed by: INTERNAL MEDICINE

## 2021-10-10 PROCEDURE — 85007 BL SMEAR W/DIFF WBC COUNT: CPT | Performed by: INTERNAL MEDICINE

## 2021-10-10 PROCEDURE — 84484 ASSAY OF TROPONIN QUANT: CPT | Performed by: INTERNAL MEDICINE

## 2021-10-10 PROCEDURE — 84133 ASSAY OF URINE POTASSIUM: CPT | Performed by: INTERNAL MEDICINE

## 2021-10-10 PROCEDURE — P9047 ALBUMIN (HUMAN), 25%, 50ML: HCPCS | Performed by: INTERNAL MEDICINE

## 2021-10-10 PROCEDURE — 86738 MYCOPLASMA ANTIBODY: CPT | Performed by: INTERNAL MEDICINE

## 2021-10-10 PROCEDURE — 85025 COMPLETE CBC W/AUTO DIFF WBC: CPT | Performed by: INTERNAL MEDICINE

## 2021-10-10 PROCEDURE — 76775 US EXAM ABDO BACK WALL LIM: CPT

## 2021-10-10 PROCEDURE — 82436 ASSAY OF URINE CHLORIDE: CPT | Performed by: INTERNAL MEDICINE

## 2021-10-10 PROCEDURE — 84156 ASSAY OF PROTEIN URINE: CPT | Performed by: INTERNAL MEDICINE

## 2021-10-10 PROCEDURE — 25010000002 MICAFUNGIN SODIUM 100 MG RECONSTITUTED SOLUTION 1 EACH VIAL: Performed by: PHYSICIAN ASSISTANT

## 2021-10-10 PROCEDURE — 87899 AGENT NOS ASSAY W/OPTIC: CPT | Performed by: INTERNAL MEDICINE

## 2021-10-10 PROCEDURE — 83735 ASSAY OF MAGNESIUM: CPT | Performed by: INTERNAL MEDICINE

## 2021-10-10 PROCEDURE — 25010000002 LEVOFLOXACIN PER 250 MG: Performed by: INTERNAL MEDICINE

## 2021-10-10 PROCEDURE — 84100 ASSAY OF PHOSPHORUS: CPT | Performed by: INTERNAL MEDICINE

## 2021-10-10 RX ORDER — ASPIRIN 81 MG/1
81 TABLET ORAL DAILY
Status: DISCONTINUED | OUTPATIENT
Start: 2021-10-10 | End: 2021-10-15 | Stop reason: HOSPADM

## 2021-10-10 RX ORDER — ALBUTEROL SULFATE 90 UG/1
2 AEROSOL, METERED RESPIRATORY (INHALATION)
Status: DISCONTINUED | OUTPATIENT
Start: 2021-10-10 | End: 2021-10-15 | Stop reason: HOSPADM

## 2021-10-10 RX ORDER — IPRATROPIUM BROMIDE AND ALBUTEROL SULFATE 2.5; .5 MG/3ML; MG/3ML
3 SOLUTION RESPIRATORY (INHALATION)
Status: DISCONTINUED | OUTPATIENT
Start: 2021-10-10 | End: 2021-10-10

## 2021-10-10 RX ORDER — ALBUTEROL SULFATE 90 UG/1
2 AEROSOL, METERED RESPIRATORY (INHALATION) ONCE
Status: COMPLETED | OUTPATIENT
Start: 2021-10-10 | End: 2021-10-10

## 2021-10-10 RX ORDER — ALBUMIN (HUMAN) 12.5 G/50ML
12.5 SOLUTION INTRAVENOUS ONCE
Status: COMPLETED | OUTPATIENT
Start: 2021-10-10 | End: 2021-10-10

## 2021-10-10 RX ORDER — LISINOPRIL 5 MG/1
5 TABLET ORAL DAILY
COMMUNITY
End: 2021-10-15 | Stop reason: HOSPADM

## 2021-10-10 RX ORDER — INSULIN GLARGINE 100 [IU]/ML
10 INJECTION, SOLUTION SUBCUTANEOUS 2 TIMES DAILY
Status: ON HOLD | COMMUNITY
End: 2021-10-11

## 2021-10-10 RX ORDER — DEXTROSE MONOHYDRATE 25 G/50ML
25 INJECTION, SOLUTION INTRAVENOUS
Status: DISCONTINUED | OUTPATIENT
Start: 2021-10-10 | End: 2021-10-15 | Stop reason: HOSPADM

## 2021-10-10 RX ORDER — SODIUM CHLORIDE 9 MG/ML
100 INJECTION, SOLUTION INTRAVENOUS CONTINUOUS
Status: DISCONTINUED | OUTPATIENT
Start: 2021-10-10 | End: 2021-10-11

## 2021-10-10 RX ORDER — SODIUM CHLORIDE 0.9 % (FLUSH) 0.9 %
3-10 SYRINGE (ML) INJECTION AS NEEDED
Status: DISCONTINUED | OUTPATIENT
Start: 2021-10-10 | End: 2021-10-15 | Stop reason: HOSPADM

## 2021-10-10 RX ORDER — GABAPENTIN 100 MG/1
100-200 CAPSULE ORAL 3 TIMES DAILY
COMMUNITY
End: 2021-10-15 | Stop reason: HOSPADM

## 2021-10-10 RX ORDER — SODIUM CHLORIDE 0.9 % (FLUSH) 0.9 %
3 SYRINGE (ML) INJECTION EVERY 12 HOURS SCHEDULED
Status: DISCONTINUED | OUTPATIENT
Start: 2021-10-10 | End: 2021-10-15 | Stop reason: HOSPADM

## 2021-10-10 RX ORDER — NICOTINE POLACRILEX 4 MG
15 LOZENGE BUCCAL
Status: DISCONTINUED | OUTPATIENT
Start: 2021-10-10 | End: 2021-10-15 | Stop reason: HOSPADM

## 2021-10-10 RX ORDER — LEVOFLOXACIN 5 MG/ML
750 INJECTION, SOLUTION INTRAVENOUS ONCE
Status: COMPLETED | OUTPATIENT
Start: 2021-10-10 | End: 2021-10-10

## 2021-10-10 RX ORDER — LEVOFLOXACIN 5 MG/ML
500 INJECTION, SOLUTION INTRAVENOUS
Status: DISCONTINUED | OUTPATIENT
Start: 2021-10-12 | End: 2021-10-15 | Stop reason: HOSPADM

## 2021-10-10 RX ADMIN — INSULIN ASPART 6 UNITS: 100 INJECTION, SOLUTION INTRAVENOUS; SUBCUTANEOUS at 11:35

## 2021-10-10 RX ADMIN — HUMAN INSULIN 10 UNITS: 100 INJECTION, SOLUTION SUBCUTANEOUS at 09:57

## 2021-10-10 RX ADMIN — METRONIDAZOLE 500 MG: 500 INJECTION, SOLUTION INTRAVENOUS at 20:20

## 2021-10-10 RX ADMIN — ASPIRIN 81 MG: 81 TABLET, COATED ORAL at 11:31

## 2021-10-10 RX ADMIN — METRONIDAZOLE 500 MG: 500 INJECTION, SOLUTION INTRAVENOUS at 11:32

## 2021-10-10 RX ADMIN — HUMAN INSULIN 10 UNITS: 100 INJECTION, SOLUTION SUBCUTANEOUS at 17:35

## 2021-10-10 RX ADMIN — ALBUTEROL SULFATE 2 PUFF: 90 AEROSOL, METERED RESPIRATORY (INHALATION) at 00:51

## 2021-10-10 RX ADMIN — MICAFUNGIN SODIUM 100 MG: 100 INJECTION, POWDER, LYOPHILIZED, FOR SOLUTION INTRAVENOUS at 17:09

## 2021-10-10 RX ADMIN — SODIUM CHLORIDE, PRESERVATIVE FREE 3 ML: 5 INJECTION INTRAVENOUS at 20:21

## 2021-10-10 RX ADMIN — INSULIN ASPART 7 UNITS: 100 INJECTION, SOLUTION INTRAVENOUS; SUBCUTANEOUS at 05:53

## 2021-10-10 RX ADMIN — INSULIN ASPART 6 UNITS: 100 INJECTION, SOLUTION INTRAVENOUS; SUBCUTANEOUS at 04:06

## 2021-10-10 RX ADMIN — METRONIDAZOLE 500 MG: 500 INJECTION, SOLUTION INTRAVENOUS at 04:07

## 2021-10-10 RX ADMIN — SODIUM CHLORIDE, PRESERVATIVE FREE 3 ML: 5 INJECTION INTRAVENOUS at 09:57

## 2021-10-10 RX ADMIN — INSULIN DETEMIR 10 UNITS: 100 INJECTION, SOLUTION SUBCUTANEOUS at 20:22

## 2021-10-10 RX ADMIN — LEVOFLOXACIN 750 MG: 5 INJECTION, SOLUTION INTRAVENOUS at 04:07

## 2021-10-10 RX ADMIN — ALBUTEROL SULFATE 2 PUFF: 90 AEROSOL, METERED RESPIRATORY (INHALATION) at 20:22

## 2021-10-10 RX ADMIN — ALBUMIN HUMAN 12.5 G: 0.25 SOLUTION INTRAVENOUS at 13:35

## 2021-10-10 RX ADMIN — ENOXAPARIN SODIUM 30 MG: 30 INJECTION SUBCUTANEOUS at 09:57

## 2021-10-10 RX ADMIN — SODIUM CHLORIDE 100 ML/HR: 9 INJECTION, SOLUTION INTRAVENOUS at 13:35

## 2021-10-10 NOTE — PROGRESS NOTES
Interim hospitalist note    Patient seen and examined via video conferencing equipment.  Patient states that she feels quite ill and has general fatigue and malaise.  She also has shortness of breath with nonproductive cough.  She states she does not feel much different compared to when she was first admitted.  Did discuss with patient the possibility of underlying bacterial pneumonia versus atypical presentation of COVID-19.  Will obtain procalcitonin and consult infectious disease for further recommendations.  We will continue empiric antibiotic therapy for community-acquired pneumonia versus aspiration pneumonia with Levaquin and Flagyl.

## 2021-10-10 NOTE — CONSULTS
INFECTIOUS DISEASE CONSULTATION REPORT        Patient Identification:  Name:  Jessica Bravo  Age:  56 y.o.  Sex:  female  :  1965  MRN:  [id number]   Visit Number:  [id number]  Primary Care Physician:  Buck Wallis APRN       LOS: 0 days        Subjective       Subjective     History of present illness:      Thank you Dr. López for allowing us to participate in the care of your patient.  As you well know, Ms. Jessica Bravo is a 56 y.o. female with past medical history significant for solitary kidney, ESRD on hemodialysis, hypertension, hepatic steatosis, and type 2 diabetes mellitus, who presented to [facility] Emergency Department on 10/9/2021 for shortness of breath and fatigue.  Patient was diagnosed with COVID-19 3 weeks ago.  She has not received monoclonal antibody or remdesivir therapy.  Since that time, patient has shown worsening.      Today, the patient is on 5 L humidified nasal cannula.  Nurse reports that patient has a productive cough but is otherwise doing well today.  Afebrile, no diarrhea.  CRP on 10/9/2021 was elevated at 29.13.  WBC is normal.  Lactic acid was elevated on 10/9/2021 at 2.8.  Urinalysis on 10/9/2021 was positive with 31-50 WBCs, 1+ bacteria, and 3+ budding yeast.  Urine culture is in process.  Nuclear medicine lung scan on 10/9/2021 showed low probability of PE on the basis of perfusion only nuclear medicine study.  CT chest without contrast on 10/9/2021 showed extensive patchy groundglass infiltrates scattered throughout both lungs, consistent with multifocal pneumonia.  COVID-19 reporting criteria: Typical.  Commonly reported imaging features of COVID-19 pneumonia are present.  Other processes such as influenza pneumonia and organizing pneumonia can cause a similar imaging pattern.  Hepatic steatosis.  Multiple prominent mediastinal lymph nodes are likely reactive.  Follow-up recommended.  Chest x-ray on 10/9/2021 showed extensive bilateral patchy  airspace disease.  Commonly reported imaging features of COVID-19 pneumonia are present.  Other processes such as influenza pneumonia and organizing pneumonia can cause a similar imaging pattern.  COVID-19 and flu A/B PCR on 10/9/2021 was positive.  Mycoplasma pneumonia antibody on 10/10/2021 was negative.  Strep pneumo antigen on 10/10/2021 is in process.  Blood cultures on 10/9/2021 are in process.    The patient is currently on Levaquin and Flagyl.    Infectious Disease consultation was requested for antimicrobial management.    ---------------------------------------------------------------------------------------------------------------------     Past Medical History    Past Medical History:   Diagnosis Date    Chronic kidney disease     only has one kidney, has been in hopsital 3 times since last visit     ESRD on hemodialysis (HCC)     Essential hypertension     Hepatic steatosis     History of noncompliance with medical treatment     Pre-transplant evaluation for kidney transplant 03/31/2021    Declined by the [facility]    Single kidney     Type 2 diabetes mellitus (HCC)        Past Surgical History    Past Surgical History:   Procedure Laterality Date    HYSTERECTOMY         Family History    Family History   Problem Relation Age of Onset    Diabetes Mother     Hypertension Mother     Diabetes Father     Hypertension Father     Diabetes Sister     Hypertension Sister     Diabetes Brother        Social History    Social History     Tobacco Use    Smoking status: Never Smoker    Smokeless tobacco: Never Used   Substance Use Topics    Alcohol use: No    Drug use: No       Allergies    Bactrim [sulfamethoxazole-trimethoprim], Sulfa antibiotics, Benadryl [diphenhydramine hcl (sleep)], and Penicillins  ---------------------------------------------------------------------------------------------------------------------     Home Medications:    Prior to Admission Medications       Prescriptions Last  Dose Informant Patient Reported? Taking?    aspirin 81 MG tablet 10/9/2021 Self Yes Yes    Take 1 tablet by mouth Daily.    furosemide (LASIX) 80 MG tablet 10/9/2021 Self Yes Yes    Take 80 mg by mouth 2 (Two) Times a Day.    gabapentin (NEURONTIN) 100 MG capsule 10/9/2021 Self Yes Yes    Take 200 mg by mouth 3 (Three) Times a Day.    insulin aspart (novoLOG) 100 UNIT/ML injection 10/9/2021 Self Yes Yes    Inject 36 Units under the skin into the appropriate area as directed 3 (Three) Times a Day Before Meals.    Insulin Glargine (BASAGLAR KWIKPEN) 100 UNIT/ML injection pen 10/9/2021 Self Yes Yes    Inject 10 Units under the skin into the appropriate area as directed 2 (Two) Times a Day.    lisinopril (PRINIVIL,ZESTRIL) 5 MG tablet 10/9/2021 Self Yes Yes    Take 5 mg by mouth Daily.          ---------------------------------------------------------------------------------------------------------------------    Objective       Objective     Hospital Scheduled Meds:  albumin human, 12.5 g, Intravenous, Once  albuterol sulfate HFA, 2 puff, Inhalation, 4x Daily - RT  aspirin, 81 mg, Oral, Daily  enoxaparin, 30 mg, Subcutaneous, Daily  insulin aspart, 0-7 Units, Subcutaneous, TID AC  insulin detemir, 10 Units, Subcutaneous, Nightly  [START ON 10/12/2021] levoFLOXacin, 500 mg, Intravenous, Q48H  metroNIDAZOLE, 500 mg, Intravenous, Q8H  sodium chloride, 3 mL, Intravenous, Q12H      Pharmacy to Dose enoxaparin (LOVENOX),   sodium chloride, 100 mL/hr, Last Rate: 100 mL/hr (10/10/21 1335)      ---------------------------------------------------------------------------------------------------------------------   Vital Signs:  Temp:  [97.5 °F (36.4 °C)-98.1 °F (36.7 °C)] 97.5 °F (36.4 °C)  Heart Rate:  [68-94] 68  Resp:  [18-24] 18  BP: (113-169)/() 158/70  Mean Arterial Pressure (Non-Invasive) for the past 24 hrs (Last 3 readings):   Noninvasive MAP (mmHg)   10/10/21 0101 94   10/09/21 1804 94   10/09/21 1733 113     SpO2  Percentage    10/10/21 0729 10/10/21 0751 10/10/21 1336   SpO2: 94%  Comment: 2350 93% 94%     SpO2:  [78 %-97 %] 94 %  on  Flow (L/min):  [3-5] 5;   Device (Oxygen Therapy): humidified; nasal cannula    Body mass index is 27.46 kg/m².  Wt Readings from Last 3 Encounters:   10/10/21 72.6 kg (160 lb)   02/05/20 78 kg (172 lb)   10/18/18 79.2 kg (174 lb 9.6 oz)     ---------------------------------------------------------------------------------------------------------------------     Physical Exam:    Deferred due to COVID-19 isolation.    ---------------------------------------------------------------------------------------------------------------------    Results from last 7 days   Lab Units 10/10/21  0448 10/09/21  1522   TROPONIN T ng/mL <0.010 0.016     Results from last 7 days   Lab Units 10/09/21  1522   PROBNP pg/mL 581.1       Results from last 7 days   Lab Units 10/09/21  2227   PH, ARTERIAL pH units 7.368   PO2 ART mm Hg 57.6*   PCO2, ARTERIAL mm Hg 37.1   HCO3 ART mmol/L 21.3     Results from last 7 days   Lab Units 10/10/21  0448 10/09/21  2004 10/09/21  1522   CRP mg/dL  --   --  29.13*   LACTATE mmol/L 1.7 2.7* 2.8*   WBC 10*3/mm3 7.55  --  10.15   HEMOGLOBIN g/dL 10.5*  --  11.8*   HEMATOCRIT % 33.7*  --  37.7   MCV fL 84.9  --  84.9   MCHC g/dL 31.2*  --  31.3*   PLATELETS 10*3/mm3 326  --  365     Results from last 7 days   Lab Units 10/10/21  0448 10/09/21  2218 10/09/21  1522   SODIUM mmol/L 127* 131* 128*   POTASSIUM mmol/L 4.0 4.1 3.8   MAGNESIUM mg/dL 2.1  --  2.1   CHLORIDE mmol/L 92* 95* 87*   CO2 mmol/L 16.6* 20.3* 19.8*   BUN mg/dL 62* 61* 61*   CREATININE mg/dL 3.18* 3.59* 3.90*   EGFR IF NONAFRICN AM mL/min/1.73 15* 13* 12*   CALCIUM mg/dL 8.6 8.3* 8.6   GLUCOSE mg/dL 425* 425* 534*   ALBUMIN g/dL 2.77*  --  3.19*   BILIRUBIN mg/dL 0.4  --  0.5   ALK PHOS U/L 74  --  92   AST (SGOT) U/L 14  --  16   ALT (SGPT) U/L 11  --  13   Estimated Creatinine Clearance: 18.8 mL/min (A) (by C-G  formula based on SCr of 3.18 mg/dL (H)).  No results found for: AMMONIA    Hemoglobin A1C   Date/Time Value Ref Range Status   10/09/2021 1522 10.50 (H) 4.80 - 5.60 % Final     Glucose   Date/Time Value Ref Range Status   10/10/2021 1130 364 (H) 70 - 130 mg/dL Final     Comment:     Meter: [id number] : [id number] MEGHNA HENRY   10/10/2021 0734 470 (C) 70 - 130 mg/dL Final     Comment:     Confirmed by Repeat Meter: [id number] : [id number] MICHAEL HINDS   10/10/2021 0221 368 (H) 70 - 130 mg/dL Final     Comment:     Meter: [id number] : [id number] SHASHANK WILKERSON   10/10/2021 0034 383 (H) 70 - 130 mg/dL Final     Comment:     RN Notified Physician Notified Meter: [id number] : [id number] moreno rod   10/09/2021 2217 416 (C) 70 - 130 mg/dL Final     Comment:     RN Notified Meter: [id number] : [id number] levy berger   10/09/2021 1708 436 (C) 70 - 130 mg/dL Final     Comment:     RN Notified Meter: [id number] : [id number] levy gilbert   10/09/2021 1514 495 (C) 70 - 130 mg/dL Final     Comment:     RN Notified Meter: [id number] : [id number] [facility]     Lab Results   Component Value Date    HGBA1C 10.50 (H) 10/09/2021     Lab Results   Component Value Date    TSH 0.397 10/09/2021       No results found for: BLOODCX  No results found for: URINECX  No results found for: WOUNDCX  No results found for: STOOLCX  No results found for: RESPCX  Pain Management Panel    There is no flowsheet data to display.       I have personally reviewed the above laboratory results.   ---------------------------------------------------------------------------------------------------------------------  Imaging Results (Last 7 Days)       Procedure Component Value Units Date/Time    NM Lung Scan Perfusion Particulate [[id number]] Collected: 10/09/21 1956     Updated: 10/09/21 1958    Narrative:        Clinical history:  Pericarditis and pneumonia, evaluated for pulmonary embolism.    Radiopharmaceutical: 4.0 mCi  of Tc 99m MAA was administered IV per protocol for the perfusion portion of the examination.    COMPARISON STUDY: No prior nuclear medicine study.    Findings:    Perfusion images show symmetric and uniform radiotracer uptake throughout bilateral lung fields with no evidence of segmental perfusion defects. Cardiac silhouette is normal.      Impression:        Low probability of pulmonary embolus on the basis of perfusion only nuclear medicine study.    Signer Name: Josep Fonseca MD   Signed: 10/9/2021 7:56 PM   Workstation Name: [id number]    [facility]    CT Chest Without Contrast Diagnostic [[id number]] Collected: 10/09/21 1852     Updated: 10/09/21 1855    Narrative:      CT Chest WO    INDICATION:   Shortness of air today. Covid positive.    TECHNIQUE:   CT of the chest without IV contrast. Coronal and sagittal reformatted images. Radiation dose reduction techniques included automated exposure control or exposure modulation based on body size. Count of known CT and cardiac nuc med studies performed in  previous 12 months: 0.     COMPARISON:   None available.    FINDINGS:   Thoracic aorta normal in course and caliber. Heart size is normal. No pericardial effusion. Multiple prominent mediastinal lymph nodes.    No pleural effusion. No pneumothorax. Extensive patchy groundglass infiltrates scattered throughout both lungs.    Visualized upper abdomen demonstrates diffuse fatty infiltration throughout the liver.    No acute osseous abnormality.      Impression:        1. Extensive patchy groundglass infiltrates scattered throughout both lungs, consistent with multifocal pneumonia. COVID-19 reporting criteria: Typical. Commonly reported imaging features of COVID-19 pneumonia are present. Other processes such as  influenza pneumonia and organizing pneumonia can cause a similar imaging pattern.  2. Hepatic steatosis.  3. Multiple prominent mediastinal lymph nodes, likely reactive. Follow-up  recommended.    Signer Name: Jf Fonseca MD   Signed: 10/9/2021 6:52 PM   Workstation Name: [id number]    [facility]    XR Chest 1 View [[id number]] Collected: 10/09/21 1744     Updated: 10/09/21 1746    Narrative:      CR Chest 1 Vw    INDICATION:   COVID positive. Shortness of breath.     COMPARISON:    No pertinent prior study available    FINDINGS:  Portable AP view(s) of the chest.  The heart and mediastinal contours are normal.    The lungs demonstrate extensive bilateral patchy airspace disease. No pneumothorax or pleural effusion.      Impression:      Extensive bilateral patchy airspace disease. Commonly reported imaging features of COVID-19 pneumonia are present. Other processes such as influenza pneumonia and organizing pneumonia can cause a similar imaging pattern.    Signer Name: Josep Fonseca MD   Signed: 10/9/2021 5:44 PM   Workstation Name: [id number]    [facility]          I have personally reviewed the above radiology results.   ---------------------------------------------------------------------------------------------------------------------      Assessment & Plan        Assessment/Plan       ASSESSMENT:    1.  Severe sepsis with lactic acid greater than 2 on admission  2.  COVID-19 pneumonia   3.  Superimposed bacterial pneumonia  4.  UTI    PLAN:    The patient presented to [facility] Emergency Department on 10/9/2021 for shortness of breath and fatigue.  Patient was diagnosed with COVID-19 3 weeks ago.  She has not received monoclonal antibody or remdesivir therapy.  Since that time, patient has shown worsening.      Today, the patient is on 5 L humidified nasal cannula.  Nurse reports that patient has a productive cough but is otherwise doing well today.  Afebrile, no diarrhea.  CRP on 10/9/2021 was elevated at 29.13.  WBC is normal.  Lactic acid was elevated on 10/9/2021 at 2.8.  Urinalysis on 10/9/2021 was positive with  31-50 WBCs, 1+ bacteria, and 3+ budding yeast.  Urine culture is in process.  Nuclear medicine lung scan on 10/9/2021 showed low probability of PE on the basis of perfusion only nuclear medicine study.  CT chest without contrast on 10/9/2021 showed extensive patchy groundglass infiltrates scattered throughout both lungs, consistent with multifocal pneumonia.  COVID-19 reporting criteria: Typical.  Commonly reported imaging features of COVID-19 pneumonia are present.  Other processes such as influenza pneumonia and organizing pneumonia can cause a similar imaging pattern.  Hepatic steatosis.  Multiple prominent mediastinal lymph nodes are likely reactive.  Follow-up recommended.  Chest x-ray on 10/9/2021 showed extensive bilateral patchy airspace disease.  Commonly reported imaging features of COVID-19 pneumonia are present.  Other processes such as influenza pneumonia and organizing pneumonia can cause a similar imaging pattern.  COVID-19 and flu A/B PCR on 10/9/2021 was positive.  Mycoplasma pneumonia antibody on 10/10/2021 was negative.  Strep pneumo antigen on 10/10/2021 is in process.  Blood cultures on 10/9/2021 are in process.    As the patient is 3 weeks out from COVID-19 diagnosis, she is out of the 10-day window for remdesivir administration and would likely receive very little benefit.  In addition, patient has ESRD for which remdesivir would be contraindicated. Would recommend Decadron, as patient is requiring supplemental oxygen.    For now, we are agreeable with Levaquin and Flagyl for pneumonia coverage.  As urinanalysis showed 3+ budding yeast, micafungin 100 mg IV every 24 hours was initiated today.  We will follow culture results closely and adjust antibiotic therapy as appropriate.    Again, thank you Dr. López for allowing us to participate in the care of your patient and please feel free to call for any questions you may have.    Code Status:   Code Status and Medical Interventions:   Ordered  at: 10/10/21 0104     Level Of Support Discussed With:    Patient     Code Status:    CPR     Medical Interventions (Level of Support Prior to Arrest):    Full     JOHN Holm  10/10/21  13:57 EDT

## 2021-10-10 NOTE — H&P
[facility] HOSPITALIST HISTORY AND PHYSICAL    Patient Identification:  Name:  Jessica Bravo  Age:  56 y.o.  Sex:  female  :  1965  MRN:  [id number]   Visit Number:  [id number]  Admit Date: 10/9/2021   Room number:  3327/1P  Primary Care Physician:  Buck Wallis APRN    Date of Admission: 10/9/2021     Subjective     Chief complaint:    Chief Complaint   Patient presents with   • Hyperglycemia   • Vomiting   • Nausea     History of presenting illness:  56 y.o. female who was admitted on 10/9/2021 from the ED with continued shortness of air and fatigue after being diagnosed with COVID-19 3 weeks ago as well as inability to eat and experiencing nausea and vomiting..  The patient states that she was at [facility] for 1 day, at which time she received 1 IM shot of steroids.  She was not given any monoclonal antibodies nor any remdesivir per her recollection  She also states that she was sent home without any oral antibiotics.  The patient states that she never improved and started worsening.  She decided to come to [facility] emergency department due to worsening shortness of air with exertion.  The patient states that she was coughing more during the beginning of the COVID-19 that her cough has improved.  She is not producing any sputum.  She states that her shortness of air worsens when she exerts herself.  She also has not been eating and drinking because she says that she just does not feel like it.  She thinks that maybe some of the trouble eating is due to the nausea.      In our emergency department, the patient was found to be hypoxic with saturations in the 70s; ABG performed on 4 L/min of nasal cannula oxygen showed an oxygen saturation of 89.1%.  She also had evidence of sepsis and hyperglycemia.  Chest x-ray and chest CT showed bilateral infiltrates that were central and peripheral, consolidated with air bronchograms present, with no PEs or pneumothoraces  seen.  Due to the severe sepsis and hypoxic respiratory failure, the patient being admitted to the medical surgical floor for further evaluation and treatment.  ---------------------------------------------------------------------------------------------------------------------   Review of Systems   Constitutional: Positive for appetite change (Decreased) and fatigue. Negative for diaphoresis and fever.   HENT: Negative for congestion and rhinorrhea.    Eyes: Negative for pain, discharge, redness and itching.   Respiratory: Positive for cough (But improved), shortness of breath and wheezing. Negative for stridor.    Cardiovascular: Positive for leg swelling (Chronic). Negative for chest pain.   Gastrointestinal: Positive for nausea and vomiting. Negative for abdominal distention, abdominal pain and diarrhea.   Genitourinary: Negative for dysuria and hematuria.   Musculoskeletal: Negative for back pain and myalgias.   Skin: Negative for color change, pallor, rash and wound.   Neurological: Negative for seizures, facial asymmetry, speech difficulty, light-headedness and headaches.   Psychiatric/Behavioral: Negative for agitation, behavioral problems and confusion.     ---------------------------------------------------------------------------------------------------------------------   Past Medical History:   Diagnosis Date   • Chronic kidney disease     only has one kidney, has been in hopsital [facility] times since last visit    • ESRD on hemodialysis (HCC)    • Essential hypertension    • Hepatic steatosis    • History of noncompliance with medical treatment    • Pre-transplant evaluation for kidney transplant 03/31/2021    Declined by the [facility]   • Single kidney    • Type 2 diabetes mellitus (HCC)      Past Surgical History:   Procedure Laterality Date   • HYSTERECTOMY       Family History   Problem Relation Age of Onset   • Diabetes Mother    • Hypertension Mother    • Diabetes Father    • Hypertension  Father    • Diabetes Sister    • Hypertension Sister    • Diabetes Brother      Social History     Socioeconomic History   • Marital status: Single   Tobacco Use   • Smoking status: Never Smoker   • Smokeless tobacco: Never Used   Substance and Sexual Activity   • Alcohol use: No   • Drug use: No   • Sexual activity: Defer     ---------------------------------------------------------------------------------------------------------------------   Allergies:  Bactrim [sulfamethoxazole-trimethoprim], Sulfa antibiotics, Benadryl [diphenhydramine hcl (sleep)], and Penicillins  ---------------------------------------------------------------------------------------------------------------------   Medications below are reported home medications pulling from within the system; at this time, these medications have not been reconciled unless otherwise specified and are in the verification process for further verifcation as current home medications.      Prior to Admission Medications     Prescriptions Last Dose Informant Patient Reported? Taking?    aspirin 81 MG tablet   Yes No    Take 1 tablet by mouth Daily.    clopidogrel (PLAVIX) 75 MG tablet   No No    Take 1 tablet by mouth Daily.    furosemide (LASIX) 80 MG tablet   Yes No    Take 80 mg by mouth 2 (Two) Times a Day.    gabapentin (NEURONTIN) 800 MG tablet   Yes No    Take 800 mg by mouth. Bid - qid    insulin glargine (LANTUS) 100 UNIT/ML injection   Yes No    Inject 34 Units under the skin 2 (Two) Times a Day.    isosorbide mononitrate (IMDUR) 30 MG 24 hr tablet   No No    Take 1 tablet by mouth Every Morning.    nitroglycerin (NITROSTAT) 0.4 MG SL tablet   No No    1 under the tongue as needed for angina, may repeat q5mins for up three doses        Objective     Vital Signs:  Temp:  [97.5 °F (36.4 °C)-98.1 °F (36.7 °C)] 97.5 °F (36.4 °C)  Heart Rate:  [74-94] 79  Resp:  [20-24] 20  BP: (113-169)/() 152/76    Mean Arterial Pressure (Non-Invasive) for the past 24  hrs (Last 3 readings):   Noninvasive MAP (mmHg)   10/10/21 0101 94   10/09/21 1804 94   10/09/21 1733 113     SpO2:  [78 %-97 %] 93 %  on  Flow (L/min):  [3-5] 5;   Device (Oxygen Therapy): nasal cannula  Body mass index is 27.46 kg/m².    Wt Readings from Last 3 Encounters:   10/10/21 72.6 kg (160 lb)   02/05/20 78 kg (172 lb)   10/18/18 79.2 kg (174 lb 9.6 oz)      ----------------------------------------------------------------------------------------------------------------------  Physical Exam  Constitutional:       Appearance: Normal appearance. She is well-developed and normal weight. She is ill-appearing. She is not toxic-appearing or diaphoretic.      Interventions: Nasal cannula in place.   HENT:      Head: Normocephalic and atraumatic.      Right Ear: External ear normal.      Left Ear: External ear normal.      Nose: Nose normal.   Eyes:      General: No scleral icterus.        Right eye: No discharge.         Left eye: No discharge.      Extraocular Movements: Extraocular movements intact.      Conjunctiva/sclera: Conjunctivae normal.      Pupils: Pupils are equal, round, and reactive to light.   Cardiovascular:      Rate and Rhythm: Normal rate and regular rhythm.      Pulses: Normal pulses.      Heart sounds: No murmur heard.      Pulmonary:      Effort: Accessory muscle usage, prolonged expiration, respiratory distress and retractions present.      Breath sounds: Decreased air movement present. Wheezing and rales present.   Abdominal:      General: Abdomen is flat. Bowel sounds are normal. There is no distension.      Palpations: Abdomen is soft.   Musculoskeletal:         General: No swelling, deformity or signs of injury.   Skin:     Capillary Refill: Capillary refill takes less than 2 seconds.      Coloration: Skin is not jaundiced.      Findings: No bruising or rash.   Neurological:      Mental Status: She is alert and oriented to person, place, and time. Mental status is at baseline.       Cranial Nerves: No cranial nerve deficit.   Psychiatric:         Mood and Affect: Mood normal.         Behavior: Behavior normal. Behavior is cooperative.         Thought Content: Thought content normal.         Judgment: Judgment normal.       ---------------------------------------------------------------------------------------------------------------------  EKG: Ordered    Telemetry:  NS with heart rates in the 80's.  Please note that I personally looked at the telemetry strips.      Last echocardiogram:         I have personally read the ECHO final report.   --------------------------------------------------------------------------------------------------------------------  LABS:  COVID LABS:  Results From Last 14 Days   Lab Units 10/09/21  2004 10/09/21  1522   PROBNP pg/mL  --  581.1   CRP mg/dL  --  29.13*   D DIMER QUANT MCGFEU/mL 2.82* 3.09*   FERRITIN ng/mL  --  533.00*   LACTATE mmol/L 2.7* 2.8*   LDH U/L  --  296*   TROPONIN T ng/mL  --  0.016       CBC and coagulation:  Results from last 7 days   Lab Units 10/09/21  2004 10/09/21  1522   LACTATE mmol/L 2.7* 2.8*   CRP mg/dL  --  29.13*   WBC 10*3/mm3  --  10.15   HEMOGLOBIN g/dL  --  11.8*   HEMATOCRIT %  --  37.7   MCV fL  --  84.9   MCHC g/dL  --  31.3*   PLATELETS 10*3/mm3  --  365   D DIMER QUANT MCGFEU/mL 2.82* 3.09*     Acid/base balance:  Results from last 7 days   Lab Units 10/09/21  2227 10/09/21  1529   PH, ARTERIAL pH units 7.368 7.386   PO2 ART mm Hg 57.6* 43.1*   PCO2, ARTERIAL mm Hg 37.1 34.4*   HCO3 ART mmol/L 21.3 20.6     Renal and electrolytes:  Results from last 7 days   Lab Units 10/09/21  2218 10/09/21  1522   SODIUM mmol/L 131* 128*   POTASSIUM mmol/L 4.1 3.8   MAGNESIUM mg/dL  --  2.1   CHLORIDE mmol/L 95* 87*   CO2 mmol/L 20.3* 19.8*   BUN mg/dL 61* 61*   CREATININE mg/dL 3.59* 3.90*   EGFR IF NONAFRICN AM mL/min/1.73 13* 12*   CALCIUM mg/dL 8.3* 8.6   GLUCOSE mg/dL 425* 534*     Estimated Creatinine Clearance: 16.7 mL/min (A)  (by C-G formula based on SCr of 3.59 mg/dL (H)).    Liver and pancreatic function:  Results from last 7 days   Lab Units 10/09/21  1522   ALBUMIN g/dL 3.19*   BILIRUBIN mg/dL 0.5   ALK PHOS U/L 92   AST (SGOT) U/L 16   ALT (SGPT) U/L 13     Endocrine function:  Point of care bedside glucose levels:  Results from last 7 days   Lab Units 10/10/21  0034 10/09/21  2217 10/09/21  1708 10/09/21  1514   GLUCOSE mg/dL 383* 416* 436* 495*     Cardiac:  Results from last 7 days   Lab Units 10/09/21  1522   TROPONIN T ng/mL 0.016   PROBNP pg/mL 581.1       Cultures:  Lab Results   Component Value Date    COLORU Yellow 10/09/2021    CLARITYU Cloudy (A) 10/09/2021    PHUR 8.0 10/09/2021    GLUCOSEU >=1000 mg/dL (3+) (A) 10/09/2021    KETONESU Negative 10/09/2021    BLOODU Small (1+) (A) 10/09/2021    NITRITEU Negative 10/09/2021    LEUKOCYTESUR Moderate (2+) (A) 10/09/2021    BILIRUBINUR Negative 10/09/2021    UROBILINOGEN 1.0 E.U./dL 10/09/2021    RBCUA 3-5 (A) 10/09/2021    WBCUA 31-50 (A) 10/09/2021    BACTERIA 1+ (A) 10/09/2021     Microbiology Results (last 10 days)     Procedure Component Value - Date/Time    COVID PRE-OP / PRE-PROCEDURE SCREENING ORDER (NO ISOLATION) - Swab, Nasopharynx [[id number]]  (Abnormal) Collected: 10/09/21 1544    Lab Status: Final result Specimen: Swab from Nasopharynx Updated: 10/09/21 1625    Narrative:      The following orders were created for panel order COVID PRE-OP / PRE-PROCEDURE SCREENING ORDER (NO ISOLATION) - Swab, Nasopharynx.  Procedure                               Abnormality         Status                     ---------                               -----------         ------                     COVID-19 and FLU A/B PCR...[[id number]]  Abnormal            Final result                 Please view results for these tests on the individual orders.    COVID-19 and FLU A/B PCR - Swab, Nasopharynx [[id number]]  (Abnormal) Collected: 10/09/21 8911    Lab Status: Final result Specimen: Swab  from Nasopharynx Updated: 10/09/21 1625     COVID19 Detected     Influenza A PCR Not Detected     Influenza B PCR Not Detected    Narrative:      Fact sheet for providers: https://www.fda.gov/media/338132/download    Fact sheet for patients: https://www.fda.gov/media/425967/download    Test performed by PCR.  Influenza A and Influenza B negative results should be considered presumptive in samples that have a positive SARS-CoV-2 result.    Competitive inhibition studies showed that SARS-CoV-2 virus, when present at concentrations above 3.6E+04 copies/mL, can inhibit the detection and amplification of influenza A and influenza B virus RNA if present at or below 1.8E+02 copies/mL or 4.9E+02 copies/mL, respectively, and may lead to false negative influenza virus results. If co-infection with influenza A or influenza B virus is suspected in samples with a positive SARS-CoV-2 result, the sample should be re-tested with another FDA cleared, approved, or authorized influenza test, if influenza virus detection would change clinical management.          I have personally looked at the labs and they are summarized above.  ----------------------------------------------------------------------------------------------------------------------  Detailed radiology reports for the last 24 hours:    Imaging Results (Last 24 Hours)     Procedure Component Value Units Date/Time    NM Lung Scan Perfusion Particulate [[id number]] Collected: 10/09/21 1956     Updated: 10/09/21 1958    Narrative:        Clinical history:  Pericarditis and pneumonia, evaluated for pulmonary embolism.    Radiopharmaceutical: 4.0 mCi of Tc 99m MAA was administered IV per protocol for the perfusion portion of the examination.    COMPARISON STUDY: No prior nuclear medicine study.    Findings:    Perfusion images show symmetric and uniform radiotracer uptake throughout bilateral lung fields with no evidence of segmental perfusion defects. Cardiac silhouette is  normal.      Impression:        Low probability of pulmonary embolus on the basis of perfusion only nuclear medicine study.    Signer Name: Josep Fonseca MD   Signed: 10/9/2021 7:56 PM   Workstation Name: [id number]    [facility]    CT Chest Without Contrast Diagnostic [[id number]] Collected: 10/09/21 1852     Updated: 10/09/21 1855    Narrative:      CT Chest WO    INDICATION:   Shortness of air today. Covid positive.    TECHNIQUE:   CT of the chest without IV contrast. Coronal and sagittal reformatted images. Radiation dose reduction techniques included automated exposure control or exposure modulation based on body size. Count of known CT and cardiac nuc med studies performed in  previous 12 months: 0.     COMPARISON:   None available.    FINDINGS:   Thoracic aorta normal in course and caliber. Heart size is normal. No pericardial effusion. Multiple prominent mediastinal lymph nodes.    No pleural effusion. No pneumothorax. Extensive patchy groundglass infiltrates scattered throughout both lungs.    Visualized upper abdomen demonstrates diffuse fatty infiltration throughout the liver.    No acute osseous abnormality.      Impression:        1. Extensive patchy groundglass infiltrates scattered throughout both lungs, consistent with multifocal pneumonia. COVID-19 reporting criteria: Typical. Commonly reported imaging features of COVID-19 pneumonia are present. Other processes such as  influenza pneumonia and organizing pneumonia can cause a similar imaging pattern.  2. Hepatic steatosis.  3. Multiple prominent mediastinal lymph nodes, likely reactive. Follow-up recommended.    Signer Name: Jf Fonseca MD   Signed: 10/9/2021 6:52 PM   Workstation Name: [facility]    [facility]    XR Chest 1 View [[id number]] Collected: 10/09/21 1744     Updated: 10/09/21 1746    Narrative:      CR Chest 1 Vw    INDICATION:   COVID positive. Shortness of breath.     COMPARISON:     No pertinent prior study available    FINDINGS:  Portable AP view(s) of the chest.  The heart and mediastinal contours are normal.    The lungs demonstrate extensive bilateral patchy airspace disease. No pneumothorax or pleural effusion.      Impression:      Extensive bilateral patchy airspace disease. Commonly reported imaging features of COVID-19 pneumonia are present. Other processes such as influenza pneumonia and organizing pneumonia can cause a similar imaging pattern.    Signer Name: Josep Fonseca MD   Signed: 10/9/2021 5:44 PM   Workstation Name: [facility]-    [facility]          I have personally looked at the radiology images and I have read the available final reports.    Assessment & Plan       -Acute hypoxic respiratory failure and severe sepsis, both of which were present on admission, suspect due to bilateral bacterial pneumonia in a patient with previously diagnosed bilateral viral COVID-19 pneumonia  -Suspect acute COPD exacerbation that may be contributing to the acute hypoxic respiratory failure  -History of type 2 diabetes mellitus, with hyperglycemia present on admission  -Lactic acidosis and metabolic acidosis with partial respiratory alkalosis  -History of essential hypertension  -History of 1 kidney and end-stage renal disease, currently on hemodialysis, not a kidney transplant candidate at the [facility]  -History of in adequate health management    Admitted to the medical surgical floor.  We will continue the patient on oxygen wean for saturations of 94% or above.  Based on the imaging, I do not suspect that COVID-19 is causing the current issues; rather, due to the consolidated appearance of the pneumonia and the air bronchograms that I see, I think the patient most likely has bilateral bacterial pneumonia.  Therefore, we will start her on Levaquin and Flagyl to cover for both community acquired pneumonia and aspiration pneumonia.  I have ordered  an EKG as patient has severe sepsis and we are start Levaquin per our sepsis protocol.  Will obtain sputum culture, Mycoplasma testing, and Streptococcal testing.  I will start her on scheduled DuoNeb breathing treatments for the acute COPD exacerbation.  We will wean oxygen for saturations of 94% or above.  The patient did receive a dose of Decadron while in the emergency department; her glucose levels are 300-400.  I do not think that she is symptomatic from COVID-19 at this time and thus due to the hyperglycemia I will hold off on steroids.  We will obtain bedside glucose levels 4 times a day and start her on a sliding scale NovoLog; please note that the patient received a total of 22 units IV regular insulin over a few hours while she was in the emergency department.  I have restarted some of the patient's home Levemir (10 units).  The patient did have an anion gap without acidemia while she was down in the emergency department; no ketones were in her urine.  After receiving the regular insulin and treatment for the severe sepsis, the anion gap is starting to close.  Please note that the blood pressures are high normal to low hypertensive range; since the patient has severe sepsis, we will hold all of her antihypertensives for now and monitor her blood pressures closely.  I will consult nephrology as patient is on hemodialysis.  We will repeat her blood work in the morning.    VTE Prophylaxis:   Mechanical Order History:     None      Pharmalogical Order History:     None        The patient is high risk due to the following diagnoses/reasons: Severe sepsis and acute hypoxic respiratory failure    Disposition: Home with possible oxygen      Vangie Denson MD  [facility]  10/10/21  02:15 EDT

## 2021-10-10 NOTE — PAYOR COMM NOTE
"[facility]  JULIO CÉSAR HAMMOND  PHONE  [phone]  FAX  [phone]  NPI:  [id number]    ADMISSION NOTIFICATION    Jessica Hamm (56 y.o. Female)             Date of Birth Social Security Number Address Home Phone MRN    1965  [id number] [facility] [address] [phone] [id number]    Oriental orthodox Marital Status             None Single       Admission Date Admission Type Admitting Provider Attending Provider Department, Room/Bed    10/9/21 Emergency Vangie Denson MD Perkins, Jimmye S, MD [address], 3327/1P    Discharge Date Discharge Disposition Discharge Destination                         Attending Provider: Vangie Dneson MD    Allergies: Bactrim [Sulfamethoxazole-trimethoprim], Sulfa Antibiotics, Benadryl [Diphenhydramine Hcl (Sleep)], Penicillins    Isolation: Enh Drop/Con   Infection: COVID (confirmed) (10/09/21)   Code Status: CPR   Advance Care Planning Activity    Ht: 162.6 cm (64\")   Wt: 72.6 kg (160 lb)    Admission Cmt: None   Principal Problem: None                Active Insurance as of 10/9/2021     Primary Coverage     Payor Plan Insurance Group Employer/Plan Group    MEDICARE MEDICARE A & B      Payor Plan Address Payor Plan Phone Number Payor Plan Fax Number Effective Dates    [address] [phone]  2/1/2016 - None Entered    [facility] [id number]       Subscriber Name Subscriber Birth Date Member ID       JESSICA HAMM 1965 [id number]           Secondary Coverage     Payor Plan Insurance Group Employer/Plan Group    [employer] [place] WELL[place]ARE MEDICAID      Payor Plan Address Payor Plan Phone Number Payor Plan Fax Number Effective Dates    [address] [address] [phone]  3/9/2017 - None Entered    [facility] [id number]       Subscriber Name Subscriber Birth Date Member ID       JESSICA HAMM 1965 [id number]                 Emergency Contacts      (Rel.) Home Phone Work Phone Mobile Phone    Africa Simpson (Sister) [phone] -- --    " MIRTA HAMM (Son) [phone] -- --

## 2021-10-10 NOTE — PLAN OF CARE
Goal Outcome Evaluation:  Plan of Care Reviewed With: patient        Progress: no change  Outcome Summary: pt admitted to floor tonight. O2 91% on 5L NC. alert and oriented. ambulating to BSC, tolerated well. Denies pain. UA obtained. Provider pt with specimen cup for sputum sample, pt states she is unable to cough anything up at this time but will ring out if she is able. States she has not been vaccinated for Covid 19

## 2021-10-10 NOTE — PLAN OF CARE
Goal Outcome Evaluation:           Progress: no change  Outcome Summary: Patient resting in bed. O2 stable on 5L NC. No acute changes.

## 2021-10-10 NOTE — CONSULTS
Referring Provider: Dr. Denson  Reason for Consultation: acute Kidney injury on chronic kidney disease    Chief complaint shortness of breath    Subjective .   56 y.o. female past medical history of chronic kidney disease stage IV renal been on dialysis in the past who was admitted on 10/9/2021 from the ED with continued shortness of air and fatigue after being diagnosed with COVID-19 3 weeks ago as well as inability to eat and experiencing nausea and vomiting.. Patient states that she follows Dr. Araiza as outpatient has CKD stage IV and has been on dialysis for 4 times The patient states that she was at [facility] for 1 day, at which time she received 1 IM shot of steroids.  She was not given any monoclonal antibodies nor any remdesivir per her recollection  She also states that she was sent home without any oral antibiotics.  The patient states that she never improved and started worsening.  She decided to come to [facility] emergency department due to worsening shortness of air with exertion.  The patient states that she was coughing more during the beginning of the COVID-19 that her cough has improved.  She is not producing any sputum.  She states that her shortness of air worsens when she exerts herself.  She also has not been eating and drinking because she says that she just does not feel like it.  She thinks that maybe some of the trouble eating is due to the nausea.       In our emergency department, the patient was found to be hypoxic with saturations in the 70s; ABG performed on 4 L/min of nasal cannula oxygen showed an oxygen saturation of 89.1%.  She also had evidence of sepsis and hyperglycemia.  Chest x-ray and chest CT showed bilateral infiltrates that were central and peripheral, consolidated with air bronchograms present, with no PEs or pneumothoraces seen.  Due to the severe sepsis and hypoxic respiratory failure, the patient being admitted to the medical surgical floor  for further evaluation and treatment.  History of present illness:     Review of Systems  All systems were reviewed and negative except for: Above    History  Past Medical History:   Diagnosis Date   • Chronic kidney disease     only has one kidney, has been in hopsital 3 times since last visit    • ESRD on hemodialysis (HCC)    • Essential hypertension    • Hepatic steatosis    • History of noncompliance with medical treatment    • Pre-transplant evaluation for kidney transplant 03/31/2021    Declined by the [facility]   • Single kidney    • Type 2 diabetes mellitus (HCC)    ,   Past Surgical History:   Procedure Laterality Date   • HYSTERECTOMY     ,   Family History   Problem Relation Age of Onset   • Diabetes Mother    • Hypertension Mother    • Diabetes Father    • Hypertension Father    • Diabetes Sister    • Hypertension Sister    • Diabetes Brother    ,   Social History     Tobacco Use   • Smoking status: Never Smoker   • Smokeless tobacco: Never Used   Substance Use Topics   • Alcohol use: No   • Drug use: No    and Allergies:  Bactrim [sulfamethoxazole-trimethoprim], Sulfa antibiotics, Benadryl [diphenhydramine hcl (sleep)], and Penicillins    Objective     Lab Results (last 24 hours)     Procedure Component Value Units Date/Time    POC Glucose Once [[id number]]  (Abnormal) Collected: 10/10/21 0734    Specimen: Blood Updated: 10/10/21 0741     Glucose 470 mg/dL      Comment: Confirmed by Repeat Meter: [id number] : [id number] MICHAEL HINDS       Manual Differential [[id number]]  (Abnormal) Collected: 10/10/21 0448    Specimen: Blood Updated: 10/10/21 0736     Neutrophil % 88.0 %      Lymphocyte % 7.0 %      Monocyte % 2.0 %      Bands %  2.0 %      Metamyelocyte % 1.0 %      Neutrophils Absolute 6.80 10*3/mm3      Lymphocytes Absolute 0.53 10*3/mm3      Monocytes Absolute 0.15 10*3/mm3      Hypochromia Slight/1+     Platelet Morphology Normal    CBC & Differential [[id number]]  (Abnormal)  Collected: 10/10/21 0448    Specimen: Blood Updated: 10/10/21 0609    Narrative:      The following orders were created for panel order CBC & Differential.  Procedure                               Abnormality         Status                     ---------                               -----------         ------                     CBC Auto Differential[[id number]]        Abnormal            Final result               Scan Slide[[id number]]                                                                    Please view results for these tests on the individual orders.    CBC Auto Differential [[id number]]  (Abnormal) Collected: 10/10/21 0448    Specimen: Blood Updated: 10/10/21 0609     WBC 7.55 10*3/mm3      RBC 3.97 10*6/mm3      Hemoglobin 10.5 g/dL      Hematocrit 33.7 %      MCV 84.9 fL      MCH 26.4 pg      MCHC 31.2 g/dL      RDW 13.4 %      RDW-SD 42.1 fl      MPV 9.9 fL      Platelets 326 10*3/mm3     Comprehensive Metabolic Panel [[id number]]  (Abnormal) Collected: 10/10/21 0448    Specimen: Blood Updated: 10/10/21 0542     Glucose 425 mg/dL      BUN 62 mg/dL      Creatinine 3.18 mg/dL      Sodium 127 mmol/L      Potassium 4.0 mmol/L      Chloride 92 mmol/L      CO2 16.6 mmol/L      Calcium 8.6 mg/dL      Total Protein 7.0 g/dL      Albumin 2.77 g/dL      ALT (SGPT) 11 U/L      AST (SGOT) 14 U/L      Alkaline Phosphatase 74 U/L      Total Bilirubin 0.4 mg/dL      eGFR Non African Amer 15 mL/min/1.73      Globulin 4.2 gm/dL      A/G Ratio 0.7 g/dL      BUN/Creatinine Ratio 19.5     Anion Gap 18.4 mmol/L     Narrative:      GFR Normal >60  Chronic Kidney Disease <60  Kidney Failure <15      Troponin [[id number]]  (Normal) Collected: 10/10/21 0448    Specimen: Blood Updated: 10/10/21 0538     Troponin T <0.010 ng/mL     Narrative:      Troponin T Reference Range:  <= 0.03 ng/mL-   Negative for AMI  >0.03 ng/mL-     Abnormal for myocardial necrosis.  Clinicians would have to utilize clinical acumen, EKG, Troponin and  serial changes to determine if it is an Acute Myocardial Infarction or myocardial injury due to an underlying chronic condition.       Results may be falsely decreased if patient taking Biotin.      Phosphorus [[id number]]  (Normal) Collected: 10/10/21 0448    Specimen: Blood Updated: 10/10/21 0538     Phosphorus 4.1 mg/dL     Magnesium [[id number]]  (Normal) Collected: 10/10/21 0448    Specimen: Blood Updated: 10/10/21 0538     Magnesium 2.1 mg/dL     Mycoplasma Pneumoniae Antibody, IgM - Blood, [[id number]]  (Normal) Collected: 10/10/21 0213    Specimen: Blood Updated: 10/10/21 0534     Mycoplasma pneumo IgM Negative    Lactic Acid, Plasma [[id number]]  (Normal) Collected: 10/10/21 0448    Specimen: Blood Updated: 10/10/21 0526     Lactate 1.7 mmol/L     Blood Gas, Venous With Co-Ox [[id number]]  (Abnormal) Collected: 10/10/21 0454    Specimen: Venous Blood Updated: 10/10/21 0508     Site Lab     pH, Venous 7.285 pH Units      pCO2, Venous 41.7 mm Hg      pO2, Venous 23.1 mm Hg      Comment: 84 Value below reference range        HCO3, Venous 19.8 mmol/L      Comment: 84 Value below reference range        Base Excess, Venous -6.5 mmol/L      O2 Saturation, Venous 34.9 %      Comment: 84 Value below reference range        Hemoglobin, Blood Gas 10.9 g/dL      Comment: 84 Value below reference range        CO2 Content 21.1 mmol/L      Temperature 37.0 C      Barometric Pressure for Blood Gas 727 mmHg      Modality Nasal Cannula     FIO2 40 %      Flow Rate 5.0 lpm      Ventilator Mode NA     Collected by [id number]     Comment: Meter: [id number]     :  [id number]        Oxyhemoglobin Venous 34.3 %      Comment: 84 Value below reference range        Carboxyhemoglobin Venous 1.1 %      Methemoglobin Venous 0.5 %     S. Pneumo Ag Urine or CSF - Urine, Urine, Clean Catch [[id number]] Collected: 10/10/21 0218    Specimen: Urine, Clean Catch Updated: 10/10/21 0243    POC Glucose Once [[id number]]  (Abnormal) Collected:  10/10/21 0221    Specimen: Blood Updated: 10/10/21 0237     Glucose 368 mg/dL      Comment: Meter: [id number] : [id number] SHASHANK WILKERSON       TSH [[id number]]  (Normal) Collected: 10/09/21 2218    Specimen: Blood from Arm, Right Updated: 10/10/21 0227     TSH 0.397 uIU/mL     Hemoglobin A1c [[id number]]  (Abnormal) Collected: 10/09/21 1522    Specimen: Blood Updated: 10/10/21 0220     Hemoglobin A1C 10.50 %     Narrative:      Hemoglobin A1C Ranges:    Increased Risk for Diabetes  5.7% to 6.4%  Diabetes                     >= 6.5%  Diabetic Goal                < 7.0%    Phosphorus [[id number]]  (Normal) Collected: 10/09/21 2218    Specimen: Blood from Arm, Right Updated: 10/10/21 0217     Phosphorus 3.4 mg/dL     POC Glucose Once [[id number]]  (Abnormal) Collected: 10/10/21 0034    Specimen: Blood Updated: 10/10/21 0040     Glucose 383 mg/dL      Comment: RN Notified Physician Notified Meter: [id number] : [id number] mayer marcel       Basic Metabolic Panel [[id number]]  (Abnormal) Collected: 10/09/21 2218    Specimen: Blood from Arm, Right Updated: 10/09/21 2247     Glucose 425 mg/dL      BUN 61 mg/dL      Creatinine 3.59 mg/dL      Sodium 131 mmol/L      Potassium 4.1 mmol/L      Chloride 95 mmol/L      CO2 20.3 mmol/L      Calcium 8.3 mg/dL      eGFR   Amer --     Comment: <15 Indicative of kidney failure.        eGFR Non African Amer 13 mL/min/1.73      Comment: <15 Indicative of kidney failure.        BUN/Creatinine Ratio 17.0     Anion Gap 15.7 mmol/L     Narrative:      GFR Normal >60  Chronic Kidney Disease <60  Kidney Failure <15      Blood Gas, Arterial With Co-Ox [[id number]]  (Abnormal) Collected: 10/09/21 2227    Specimen: Arterial Blood Updated: 10/09/21 2229     Site Left Radial     Robert's Test Positive     pH, Arterial 7.368 pH units      pCO2, Arterial 37.1 mm Hg      pO2, Arterial 57.6 mm Hg      Comment: 84 Value below reference range        HCO3, Arterial 21.3 mmol/L      Base Excess,  Arterial -3.6 mmol/L      O2 Saturation, Arterial 89.1 %      Comment: 84 Value below reference range        Hemoglobin, Blood Gas 10.6 g/dL      Comment: 84 Value below reference range        Hematocrit, Blood Gas 32.4 %      Comment: 84 Value below reference range        Oxyhemoglobin 87.9 %      Comment: 84 Value below reference range        Methemoglobin 0.20 %      Carboxyhemoglobin 1.1 %      A-a Gradiant 146.1 mmHg      CO2 Content 22.5 mmol/L      Temperature 0.0 C      Barometric Pressure for Blood Gas 727 mmHg      Modality Nasal Cannula     FIO2 36 %      Flow Rate 4.0 lpm      Ventilator Mode NA     Note --     Notified Who DR SAGE     Notified By [id number]     Collected by [id number]     Comment: Meter: [id number]     :  [id number]        pH, Temp Corrected --     pCO2, Temperature Corrected --     pO2, Temperature Corrected --    POC Glucose Once [[id number]]  (Abnormal) Collected: 10/09/21 2217    Specimen: Blood Updated: 10/09/21 2223     Glucose 416 mg/dL      Comment: RN Notified Meter: [id number] : [id number] levy berger       Urinalysis, Microscopic Only - Urine, Clean Catch [[id number]]  (Abnormal) Collected: 10/09/21 2022    Specimen: Urine, Clean Catch Updated: 10/09/21 2053     RBC, UA 3-5 /HPF      WBC, UA 31-50 /HPF      Bacteria, UA 1+ /HPF      Squamous Epithelial Cells, UA 21-30 /HPF      Yeast, UA Large/3+ Budding Yeast /HPF      Hyaline Casts, UA None Seen /LPF      Methodology Manual Light Microscopy    Urine Culture - Urine, Urine, Clean Catch [[id number]] Collected: 10/09/21 2022    Specimen: Urine, Clean Catch Updated: 10/09/21 2053    Urinalysis With Culture If Indicated - Urine, Clean Catch [[id number]]  (Abnormal) Collected: 10/09/21 2022    Specimen: Urine, Clean Catch Updated: 10/09/21 2032     Color, UA Yellow     Appearance, UA Cloudy     pH, UA 8.0     Specific Gravity, UA 1.018     Glucose, UA >=1000 mg/dL (3+)     Ketones, UA Negative     Bilirubin, UA Negative      Blood, UA Small (1+)     Protein, UA >=300 mg/dL (3+)     Leuk Esterase, UA Moderate (2+)     Nitrite, UA Negative     Urobilinogen, UA 1.0 E.U./dL    STAT Lactic Acid, Reflex [[id number]]  (Abnormal) Collected: 10/09/21 2004    Specimen: Blood from Arm, Right Updated: 10/09/21 2027     Lactate 2.7 mmol/L     D-dimer, Quantitative [[id number]]  (Abnormal) Collected: 10/09/21 2004    Specimen: Blood from Arm, Right Updated: 10/09/21 2024     D-Dimer, Quantitative 2.82 MCGFEU/mL     Narrative:      d-Dimer assay result is to be used in conjunction with a non-high clinical pretest probability (PTP) assessment tool to exclude PE and aid in diagnosis of VTE with cutoff of 0.5 MCGFEU/mL.    Acetone [[id number]]  (Normal) Collected: 10/09/21 1727    Specimen: Blood from Arm, Left Updated: 10/09/21 1737     Acetone Negative    POC Glucose Once [[id number]]  (Abnormal) Collected: 10/09/21 1708    Specimen: Blood Updated: 10/09/21 1714     Glucose 436 mg/dL      Comment: RN Notified Meter: [id number] : [id number] levy berger       Comprehensive Metabolic Panel [[id number]]  (Abnormal) Collected: 10/09/21 1522    Specimen: Blood Updated: 10/09/21 1635     Glucose 534 mg/dL      BUN 61 mg/dL      Creatinine 3.90 mg/dL      Sodium 128 mmol/L      Potassium 3.8 mmol/L      Chloride 87 mmol/L      CO2 19.8 mmol/L      Calcium 8.6 mg/dL      Total Protein 8.0 g/dL      Albumin 3.19 g/dL      ALT (SGPT) 13 U/L      AST (SGOT) 16 U/L      Alkaline Phosphatase 92 U/L      Total Bilirubin 0.5 mg/dL      eGFR Non African Amer 12 mL/min/1.73      Comment: <15 Indicative of kidney failure.        eGFR   Amer --     Comment: <15 Indicative of kidney failure.        Globulin 4.8 gm/dL      A/G Ratio 0.7 g/dL      BUN/Creatinine Ratio 15.6     Anion Gap 21.2 mmol/L     Narrative:      GFR Normal >60  Chronic Kidney Disease <60  Kidney Failure <15      Lactic Acid, Plasma [[id number]]  (Abnormal) Collected: 10/09/21 1525     Specimen: Blood Updated: 10/09/21 1635     Lactate 2.8 mmol/L     COVID PRE-OP / PRE-PROCEDURE SCREENING ORDER (NO ISOLATION) - Swab, Nasopharynx [[id number]]  (Abnormal) Collected: 10/09/21 1545    Specimen: Swab from Nasopharynx Updated: 10/09/21 1625    Narrative:      The following orders were created for panel order COVID PRE-OP / PRE-PROCEDURE SCREENING ORDER (NO ISOLATION) - Swab, Nasopharynx.  Procedure                               Abnormality         Status                     ---------                               -----------         ------                     COVID-19 and FLU A/B PCR...[[id number]]  Abnormal            Final result                 Please view results for these tests on the individual orders.    COVID-19 and FLU A/B PCR - Swab, Nasopharynx [[id number]]  (Abnormal) Collected: 10/09/21 1545    Specimen: Swab from Nasopharynx Updated: 10/09/21 1625     COVID19 Detected     Influenza A PCR Not Detected     Influenza B PCR Not Detected    Narrative:      Fact sheet for providers: https://www.fda.gov/media/360928/download    Fact sheet for patients: https://www.fda.gov/media/555381/download    Test performed by PCR.  Influenza A and Influenza B negative results should be considered presumptive in samples that have a positive SARS-CoV-2 result.    Competitive inhibition studies showed that SARS-CoV-2 virus, when present at concentrations above 3.6E+04 copies/mL, can inhibit the detection and amplification of influenza A and influenza B virus RNA if present at or below 1.8E+02 copies/mL or 4.9E+02 copies/mL, respectively, and may lead to false negative influenza virus results. If co-infection with influenza A or influenza B virus is suspected in samples with a positive SARS-CoV-2 result, the sample should be re-tested with another FDA cleared, approved, or authorized influenza test, if influenza virus detection would change clinical management.    BNP [[id number]]  (Normal) Collected: 10/09/21 1522     Specimen: Blood Updated: 10/09/21 1616     proBNP 581.1 pg/mL     Narrative:      Among patients with dyspnea, NT-proBNP is highly sensitive for the detection of acute congestive heart failure. In addition NT-proBNP of <300 pg/ml effectively rules out acute congestive heart failure with 99% negative predictive value.    Results may be falsely decreased if patient taking Biotin.      Troponin [[id number]]  (Normal) Collected: 10/09/21 1522    Specimen: Blood Updated: 10/09/21 1616     Troponin T 0.016 ng/mL     Narrative:      Troponin T Reference Range:  <= 0.03 ng/mL-   Negative for AMI  >0.03 ng/mL-     Abnormal for myocardial necrosis.  Clinicians would have to utilize clinical acumen, EKG, Troponin and serial changes to determine if it is an Acute Myocardial Infarction or myocardial injury due to an underlying chronic condition.       Results may be falsely decreased if patient taking Biotin.      Ferritin [[id number]]  (Abnormal) Collected: 10/09/21 1522    Specimen: Blood Updated: 10/09/21 1616     Ferritin 533.00 ng/mL     Narrative:      Results may be falsely decreased if patient taking Biotin.      CBC & Differential [[id number]]  (Abnormal) Collected: 10/09/21 1522    Specimen: Blood Updated: 10/09/21 1613    Narrative:      The following orders were created for panel order CBC & Differential.  Procedure                               Abnormality         Status                     ---------                               -----------         ------                     CBC Auto Differential[[id number]]        Abnormal            Final result               Scan Slide[[id number]]                                       Final result                 Please view results for these tests on the individual orders.    Scan Slide [[id number]] Collected: 10/09/21 1522    Specimen: Blood Updated: 10/09/21 1613     Scan Slide --     Comment: See Manual Differential Results       Manual Differential [[id number]]  (Abnormal)  Collected: 10/09/21 1522    Specimen: Blood Updated: 10/09/21 1613     Neutrophil % 93.0 %      Lymphocyte % 5.0 %      Monocyte % 2.0 %      Neutrophils Absolute 9.44 10*3/mm3      Lymphocytes Absolute 0.51 10*3/mm3      Monocytes Absolute 0.20 10*3/mm3      Hypochromia Slight/1+     Platelet Morphology Normal    CBC Auto Differential [[id number]]  (Abnormal) Collected: 10/09/21 1522    Specimen: Blood Updated: 10/09/21 1613     WBC 10.15 10*3/mm3      RBC 4.44 10*6/mm3      Hemoglobin 11.8 g/dL      Hematocrit 37.7 %      MCV 84.9 fL      MCH 26.6 pg      MCHC 31.3 g/dL      RDW 13.4 %      RDW-SD 42.0 fl      MPV 10.4 fL      Platelets 365 10*3/mm3     C-reactive Protein [[id number]]  (Abnormal) Collected: 10/09/21 1522    Specimen: Blood Updated: 10/09/21 1609     C-Reactive Protein 29.13 mg/dL     Lactate Dehydrogenase [[id number]]  (Abnormal) Collected: 10/09/21 1522    Specimen: Blood Updated: 10/09/21 1609      U/L     Magnesium [[id number]]  (Normal) Collected: 10/09/21 1522    Specimen: Blood Updated: 10/09/21 1609     Magnesium 2.1 mg/dL     Blood Culture - Blood, Arm, Right [[id number]] Collected: 10/09/21 1544    Specimen: Blood from Arm, Right Updated: 10/09/21 1608    Blood Culture - Blood, Arm, Left [[id number]] Collected: 10/09/21 1544    Specimen: Blood from Arm, Left Updated: 10/09/21 1608    D-dimer, Quantitative [[id number]]  (Abnormal) Collected: 10/09/21 1522    Specimen: Blood Updated: 10/09/21 1600     D-Dimer, Quantitative 3.09 MCGFEU/mL     Narrative:      d-Dimer assay result is to be used in conjunction with a non-high clinical pretest probability (PTP) assessment tool to exclude PE and aid in diagnosis of VTE with cutoff of 0.5 MCGFEU/mL.    Blood Gas, Arterial With Co-Ox [[id number]]  (Abnormal) Collected: 10/09/21 1529    Specimen: Arterial Blood Updated: 10/09/21 1530     Site Right Radial     Robert's Test Positive     pH, Arterial 7.386 pH units      pCO2, Arterial 34.4 mm Hg       pO2, Arterial 43.1 mm Hg      Comment: 85 Value below critical limit        HCO3, Arterial 20.6 mmol/L      Base Excess, Arterial -3.8 mmol/L      O2 Saturation, Arterial 78.0 %      Comment: 84 Value below reference range        Hemoglobin, Blood Gas 11.4 g/dL      Comment: 84 Value below reference range        Hematocrit, Blood Gas 35.0 %      Comment: 84 Value below reference range        Oxyhemoglobin 76.8 %      Comment: 84 Value below reference range        Methemoglobin 0.20 %      Carboxyhemoglobin 1.3 %      A-a Gradiant 60.9 mmHg      CO2 Content 21.6 mmol/L      Temperature 0.0 C      Barometric Pressure for Blood Gas 727 mmHg      Modality Room Air     FIO2 21 %      Ventilator Mode NA     Note --     Notified Who DR SAGE AND RN     Notified By [id number]     Notified Time 10/09/2021 15:33     Collected by [id number]     Comment: Meter: [id number]     :  [id number]        pH, Temp Corrected --     pCO2, Temperature Corrected --     pO2, Temperature Corrected --    POC Glucose Once [[id number]]  (Abnormal) Collected: 10/09/21 1514    Specimen: Blood Updated: 10/09/21 1520     Glucose 495 mg/dL      Comment: RN Notified Meter: [id number] : [id number] [place]             Imaging Results (Last 24 Hours)     Procedure Component Value Units Date/Time    NM Lung Scan Perfusion Particulate [[id number]] Collected: 10/09/21 1956     Updated: 10/09/21 1958    Narrative:        Clinical history:  Pericarditis and pneumonia, evaluated for pulmonary embolism.    Radiopharmaceutical: 4.0 mCi of Tc 99m MAA was administered IV per protocol for the perfusion portion of the examination.    COMPARISON STUDY: No prior nuclear medicine study.    Findings:    Perfusion images show symmetric and uniform radiotracer uptake throughout bilateral lung fields with no evidence of segmental perfusion defects. Cardiac silhouette is normal.      Impression:        Low probability of pulmonary embolus on the basis of  perfusion only nuclear medicine study.    Signer Name: Josep Fonseca MD   Signed: 10/9/2021 7:56 PM   Workstation Name: [id number]    [facility]    CT Chest Without Contrast Diagnostic [[id number]] Collected: 10/09/21 1852     Updated: 10/09/21 1855    Narrative:      CT Chest WO    INDICATION:   Shortness of air today. Covid positive.    TECHNIQUE:   CT of the chest without IV contrast. Coronal and sagittal reformatted images. Radiation dose reduction techniques included automated exposure control or exposure modulation based on body size. Count of known CT and cardiac nuc med studies performed in  previous 12 months: 0.     COMPARISON:   None available.    FINDINGS:   Thoracic aorta normal in course and caliber. Heart size is normal. No pericardial effusion. Multiple prominent mediastinal lymph nodes.    No pleural effusion. No pneumothorax. Extensive patchy groundglass infiltrates scattered throughout both lungs.    Visualized upper abdomen demonstrates diffuse fatty infiltration throughout the liver.    No acute osseous abnormality.      Impression:        1. Extensive patchy groundglass infiltrates scattered throughout both lungs, consistent with multifocal pneumonia. COVID-19 reporting criteria: Typical. Commonly reported imaging features of COVID-19 pneumonia are present. Other processes such as  influenza pneumonia and organizing pneumonia can cause a similar imaging pattern.  2. Hepatic steatosis.  3. Multiple prominent mediastinal lymph nodes, likely reactive. Follow-up recommended.    Signer Name: Jf Fonseca MD   Signed: 10/9/2021 6:52 PM   Workstation Name: [id number]    [facility]    XR Chest 1 View [[id number]] Collected: 10/09/21 1744     Updated: 10/09/21 1746    Narrative:      CR Chest 1 Vw    INDICATION:   COVID positive. Shortness of breath.     COMPARISON:    No pertinent prior study available    FINDINGS:  Portable AP view(s) of the chest.   The heart and mediastinal contours are normal.    The lungs demonstrate extensive bilateral patchy airspace disease. No pneumothorax or pleural effusion.      Impression:      Extensive bilateral patchy airspace disease. Commonly reported imaging features of COVID-19 pneumonia are present. Other processes such as influenza pneumonia and organizing pneumonia can cause a similar imaging pattern.    Signer Name: Josep Fonseca MD   Signed: 10/9/2021 5:44 PM   Workstation [id number]    [facility]          Vital Signs   Temp:  [97.5 °F (36.4 °C)-98.1 °F (36.7 °C)] 97.5 °F (36.4 °C)  Heart Rate:  [68-94] 68  Resp:  [18-24] 18  BP: (113-169)/() 158/70    Physical Exam:     General Appearance:    Alert, cooperative, in no acute distress, oriented times three   Head:    Normocephalic, without obvious abnormality   Eyes:            Conjunctivae and sclerae normal, no icterus, no pallor, pupils CCERLA   Ears:    Ears appear intact    Throat:   No oral lesions, no thrush, oral mucosa moist   Neck:   No adenopathy,no thyromegaly, no carotid bruit, no JVD   Back:    no tenderness to percussion, range of motion normal   Lungs:     Clear to auscultation,respirations regular, even and                  unlabored    Heart:    Regular rhythm and normal rate, normal S1 and S2, no            murmur, no gallop, no rub, no click   Chest Wall:    No abnormalities observed   Abdomen:     Normal bowel sounds, no masses, no organomegaly, soft        non-tender, non-distended, no guarding, no rebound                tenderness   Rectal:     Deferred   Extremities:   Moves all extremities well, no edema, no cyanosis, no             redness   Pulses:   Pulses palpable and equal bilaterally   Skin:   No petechiae, no nodules, bruising or rash   Lymph nodes:   No palpable adenopathy   Neurologic:   Cranial nerves grossly intact, sensation normal, moves all extremities.     Results Review:   I reviewed the  patient's new clinical results.  I personally viewed and interpreted the patient's EKG/Telemetry data      Assessment/Plan     Active Problems:    Pneumonia of both lower lobes due to infectious organism    1-Acute kidney injury on chronic kidney disease stage IV: Stated patient had CKD stage IV and follows Dr. Araiza and was on dialysis for 3 times and was off dialysis so we will get records from Dr. Araiza's office for now I think patient has hyperglycemia causing intravascular depletion , will hydrate the patient get ultrasound of bilateral kidney and urine protein creatinine ratio also    2-Hyponatremia; most likely pseudohyponatremia corrected sodium is 133    3-hyperglycemia: Patient needs better control of sugars    4. Covid pneumonia: Hospitalist managing it    5-metabolic acidosis: Most likely multifactorial from lactic acidosis and acute kidney injury    Prognosis is guarded    Plan of care discussed with pt, answered all questions, patient verbalized understanding and agreed.    ANEL Ayers MD  10/10/21  10:10 EDT

## 2021-10-11 LAB
ALBUMIN SERPL-MCNC: 2.56 G/DL (ref 3.5–5.2)
ALBUMIN/GLOB SERPL: 0.6 G/DL
ALP SERPL-CCNC: 69 U/L (ref 39–117)
ALT SERPL W P-5'-P-CCNC: 9 U/L (ref 1–33)
ANION GAP SERPL CALCULATED.3IONS-SCNC: 13.7 MMOL/L (ref 5–15)
AST SERPL-CCNC: 11 U/L (ref 1–32)
BASOPHILS # BLD AUTO: 0.01 10*3/MM3 (ref 0–0.2)
BASOPHILS NFR BLD AUTO: 0.1 % (ref 0–1.5)
BILIRUB SERPL-MCNC: 0.3 MG/DL (ref 0–1.2)
BUN SERPL-MCNC: 58 MG/DL (ref 6–20)
BUN/CREAT SERPL: 19.7 (ref 7–25)
CALCIUM SPEC-SCNC: 8.5 MG/DL (ref 8.6–10.5)
CHLORIDE SERPL-SCNC: 99 MMOL/L (ref 98–107)
CO2 SERPL-SCNC: 16.3 MMOL/L (ref 22–29)
CREAT SERPL-MCNC: 2.94 MG/DL (ref 0.57–1)
CREAT UR-MCNC: 41.4 MG/DL
CRP SERPL-MCNC: 9.3 MG/DL (ref 0–0.5)
DEPRECATED RDW RBC AUTO: 42.4 FL (ref 37–54)
EOSINOPHIL # BLD AUTO: 0 10*3/MM3 (ref 0–0.4)
EOSINOPHIL NFR BLD AUTO: 0 % (ref 0.3–6.2)
ERYTHROCYTE [DISTWIDTH] IN BLOOD BY AUTOMATED COUNT: 13.7 % (ref 12.3–15.4)
GFR SERPL CREATININE-BSD FRML MDRD: 16 ML/MIN/1.73
GLOBULIN UR ELPH-MCNC: 4 GM/DL
GLUCOSE BLDC GLUCOMTR-MCNC: 339 MG/DL (ref 70–130)
GLUCOSE BLDC GLUCOMTR-MCNC: 339 MG/DL (ref 70–130)
GLUCOSE BLDC GLUCOMTR-MCNC: 435 MG/DL (ref 70–130)
GLUCOSE BLDC GLUCOMTR-MCNC: 446 MG/DL (ref 70–130)
GLUCOSE SERPL-MCNC: 530 MG/DL (ref 65–99)
HCT VFR BLD AUTO: 32.8 % (ref 34–46.6)
HGB BLD-MCNC: 10.3 G/DL (ref 12–15.9)
IMM GRANULOCYTES # BLD AUTO: 0.15 10*3/MM3 (ref 0–0.05)
IMM GRANULOCYTES NFR BLD AUTO: 1.6 % (ref 0–0.5)
LYMPHOCYTES # BLD AUTO: 0.48 10*3/MM3 (ref 0.7–3.1)
LYMPHOCYTES NFR BLD AUTO: 5.3 % (ref 19.6–45.3)
MAGNESIUM SERPL-MCNC: 1.8 MG/DL (ref 1.6–2.6)
MCH RBC QN AUTO: 26.4 PG (ref 26.6–33)
MCHC RBC AUTO-ENTMCNC: 31.4 G/DL (ref 31.5–35.7)
MCV RBC AUTO: 84.1 FL (ref 79–97)
MONOCYTES # BLD AUTO: 0.35 10*3/MM3 (ref 0.1–0.9)
MONOCYTES NFR BLD AUTO: 3.8 % (ref 5–12)
NEUTROPHILS NFR BLD AUTO: 8.11 10*3/MM3 (ref 1.7–7)
NEUTROPHILS NFR BLD AUTO: 89.2 % (ref 42.7–76)
NRBC BLD AUTO-RTO: 0 /100 WBC (ref 0–0.2)
PLATELET # BLD AUTO: 359 10*3/MM3 (ref 140–450)
PMV BLD AUTO: 9.7 FL (ref 6–12)
POTASSIUM SERPL-SCNC: 3.9 MMOL/L (ref 3.5–5.2)
PROT SERPL-MCNC: 6.6 G/DL (ref 6–8.5)
PROT UR-MCNC: 166 MG/DL
PROT/CREAT UR: 4009.7 MG/G CREA (ref 0–200)
RBC # BLD AUTO: 3.9 10*6/MM3 (ref 3.77–5.28)
SODIUM SERPL-SCNC: 129 MMOL/L (ref 136–145)
WBC # BLD AUTO: 9.1 10*3/MM3 (ref 3.4–10.8)

## 2021-10-11 PROCEDURE — 25010000002 DEXAMETHASONE PER 1 MG: Performed by: HOSPITALIST

## 2021-10-11 PROCEDURE — 63710000001 INSULIN ASPART PER 5 UNITS: Performed by: INTERNAL MEDICINE

## 2021-10-11 PROCEDURE — 97162 PT EVAL MOD COMPLEX 30 MIN: CPT

## 2021-10-11 PROCEDURE — 94799 UNLISTED PULMONARY SVC/PX: CPT

## 2021-10-11 PROCEDURE — 63710000001 INSULIN ASPART PER 5 UNITS: Performed by: HOSPITALIST

## 2021-10-11 PROCEDURE — 25010000002 MICAFUNGIN SODIUM 100 MG RECONSTITUTED SOLUTION 1 EACH VIAL: Performed by: PHYSICIAN ASSISTANT

## 2021-10-11 PROCEDURE — 92610 EVALUATE SWALLOWING FUNCTION: CPT

## 2021-10-11 PROCEDURE — 94760 N-INVAS EAR/PLS OXIMETRY 1: CPT

## 2021-10-11 PROCEDURE — 82962 GLUCOSE BLOOD TEST: CPT

## 2021-10-11 PROCEDURE — 85025 COMPLETE CBC W/AUTO DIFF WBC: CPT | Performed by: INTERNAL MEDICINE

## 2021-10-11 PROCEDURE — 80053 COMPREHEN METABOLIC PANEL: CPT | Performed by: INTERNAL MEDICINE

## 2021-10-11 PROCEDURE — 86140 C-REACTIVE PROTEIN: CPT | Performed by: PHYSICIAN ASSISTANT

## 2021-10-11 PROCEDURE — 99232 SBSQ HOSP IP/OBS MODERATE 35: CPT | Performed by: HOSPITALIST

## 2021-10-11 PROCEDURE — 83735 ASSAY OF MAGNESIUM: CPT | Performed by: INTERNAL MEDICINE

## 2021-10-11 PROCEDURE — 63710000001 INSULIN DETEMIR PER 5 UNITS: Performed by: HOSPITALIST

## 2021-10-11 PROCEDURE — 25010000002 ENOXAPARIN PER 10 MG: Performed by: INTERNAL MEDICINE

## 2021-10-11 RX ORDER — DICYCLOMINE HCL 20 MG
20 TABLET ORAL 3 TIMES DAILY PRN
Status: CANCELLED | OUTPATIENT
Start: 2021-10-11

## 2021-10-11 RX ORDER — CLOBETASOL PROPIONATE 0.5 MG/G
CREAM TOPICAL 2 TIMES DAILY PRN
Status: CANCELLED | OUTPATIENT
Start: 2021-10-11

## 2021-10-11 RX ORDER — FUROSEMIDE 80 MG
80 TABLET ORAL
Status: CANCELLED | OUTPATIENT
Start: 2021-10-11

## 2021-10-11 RX ORDER — ALBUTEROL SULFATE 90 UG/1
2 AEROSOL, METERED RESPIRATORY (INHALATION) EVERY 4 HOURS PRN
Status: CANCELLED | OUTPATIENT
Start: 2021-10-11

## 2021-10-11 RX ORDER — GABAPENTIN 100 MG/1
100 CAPSULE ORAL 3 TIMES DAILY
Status: CANCELLED | OUTPATIENT
Start: 2021-10-11

## 2021-10-11 RX ORDER — LISINOPRIL 2.5 MG/1
5 TABLET ORAL DAILY
Status: CANCELLED | OUTPATIENT
Start: 2021-10-11

## 2021-10-11 RX ORDER — MONTELUKAST SODIUM 10 MG/1
10 TABLET ORAL NIGHTLY
COMMUNITY

## 2021-10-11 RX ORDER — ECHINACEA PURPUREA EXTRACT 125 MG
2 TABLET ORAL 4 TIMES DAILY PRN
Status: DISCONTINUED | OUTPATIENT
Start: 2021-10-11 | End: 2021-10-15 | Stop reason: HOSPADM

## 2021-10-11 RX ORDER — FLUTICASONE PROPIONATE 50 MCG
1 SPRAY, SUSPENSION (ML) NASAL 2 TIMES DAILY
COMMUNITY

## 2021-10-11 RX ORDER — DICYCLOMINE HCL 20 MG
20 TABLET ORAL 3 TIMES DAILY PRN
COMMUNITY
End: 2021-10-15 | Stop reason: HOSPADM

## 2021-10-11 RX ORDER — ECHINACEA PURPUREA EXTRACT 125 MG
2 TABLET ORAL 4 TIMES DAILY PRN
COMMUNITY
End: 2021-10-15 | Stop reason: HOSPADM

## 2021-10-11 RX ORDER — NICOTINE POLACRILEX 4 MG
15 LOZENGE BUCCAL
Status: DISCONTINUED | OUTPATIENT
Start: 2021-10-11 | End: 2021-10-15 | Stop reason: HOSPADM

## 2021-10-11 RX ORDER — DEXTROSE MONOHYDRATE 25 G/50ML
25 INJECTION, SOLUTION INTRAVENOUS
Status: DISCONTINUED | OUTPATIENT
Start: 2021-10-11 | End: 2021-10-15 | Stop reason: HOSPADM

## 2021-10-11 RX ORDER — MONTELUKAST SODIUM 10 MG/1
10 TABLET ORAL NIGHTLY
Status: DISCONTINUED | OUTPATIENT
Start: 2021-10-11 | End: 2021-10-15 | Stop reason: HOSPADM

## 2021-10-11 RX ORDER — ASPIRIN 81 MG/1
81 TABLET ORAL DAILY
Status: CANCELLED | OUTPATIENT
Start: 2021-10-11

## 2021-10-11 RX ORDER — HYDROXYZINE HYDROCHLORIDE 25 MG/1
25 TABLET, FILM COATED ORAL NIGHTLY
COMMUNITY

## 2021-10-11 RX ORDER — PRAVASTATIN SODIUM 10 MG
10 TABLET ORAL DAILY
Status: DISCONTINUED | OUTPATIENT
Start: 2021-10-12 | End: 2021-10-15 | Stop reason: HOSPADM

## 2021-10-11 RX ORDER — FAMOTIDINE 20 MG/1
20 TABLET, FILM COATED ORAL NIGHTLY PRN
Status: DISCONTINUED | OUTPATIENT
Start: 2021-10-11 | End: 2021-10-15 | Stop reason: HOSPADM

## 2021-10-11 RX ORDER — ONDANSETRON 4 MG/1
4 TABLET, ORALLY DISINTEGRATING ORAL EVERY 4 HOURS PRN
Status: CANCELLED | OUTPATIENT
Start: 2021-10-11

## 2021-10-11 RX ORDER — PRAVASTATIN SODIUM 10 MG
10 TABLET ORAL DAILY
COMMUNITY
End: 2021-11-06 | Stop reason: HOSPADM

## 2021-10-11 RX ORDER — ONDANSETRON 4 MG/1
4 TABLET, ORALLY DISINTEGRATING ORAL EVERY 4 HOURS PRN
COMMUNITY
End: 2021-10-15 | Stop reason: HOSPADM

## 2021-10-11 RX ORDER — DEXAMETHASONE SODIUM PHOSPHATE 4 MG/ML
6 INJECTION, SOLUTION INTRA-ARTICULAR; INTRALESIONAL; INTRAMUSCULAR; INTRAVENOUS; SOFT TISSUE DAILY
Status: DISCONTINUED | OUTPATIENT
Start: 2021-10-11 | End: 2021-10-15 | Stop reason: HOSPADM

## 2021-10-11 RX ORDER — ALBUTEROL SULFATE 90 UG/1
2 AEROSOL, METERED RESPIRATORY (INHALATION) EVERY 4 HOURS PRN
COMMUNITY

## 2021-10-11 RX ORDER — FLUTICASONE PROPIONATE 50 MCG
1 SPRAY, SUSPENSION (ML) NASAL 2 TIMES DAILY
Status: DISCONTINUED | OUTPATIENT
Start: 2021-10-11 | End: 2021-10-15 | Stop reason: HOSPADM

## 2021-10-11 RX ORDER — FAMOTIDINE 20 MG/1
20 TABLET, FILM COATED ORAL NIGHTLY PRN
COMMUNITY

## 2021-10-11 RX ORDER — ERGOCALCIFEROL 1.25 MG/1
50000 CAPSULE ORAL WEEKLY
COMMUNITY

## 2021-10-11 RX ORDER — HYDROXYZINE HYDROCHLORIDE 25 MG/1
25 TABLET, FILM COATED ORAL NIGHTLY
Status: DISCONTINUED | OUTPATIENT
Start: 2021-10-11 | End: 2021-10-15 | Stop reason: HOSPADM

## 2021-10-11 RX ADMIN — MONTELUKAST SODIUM 10 MG: 10 TABLET, COATED ORAL at 20:30

## 2021-10-11 RX ADMIN — SODIUM BICARBONATE 75 ML/HR: 84 INJECTION, SOLUTION INTRAVENOUS at 10:52

## 2021-10-11 RX ADMIN — DEXAMETHASONE SODIUM PHOSPHATE 6 MG: 4 INJECTION, SOLUTION INTRA-ARTICULAR; INTRALESIONAL; INTRAMUSCULAR; INTRAVENOUS; SOFT TISSUE at 10:52

## 2021-10-11 RX ADMIN — ASPIRIN 81 MG: 81 TABLET, COATED ORAL at 08:41

## 2021-10-11 RX ADMIN — METRONIDAZOLE 500 MG: 500 INJECTION, SOLUTION INTRAVENOUS at 20:30

## 2021-10-11 RX ADMIN — INSULIN ASPART 7 UNITS: 100 INJECTION, SOLUTION INTRAVENOUS; SUBCUTANEOUS at 08:41

## 2021-10-11 RX ADMIN — INSULIN DETEMIR 25 UNITS: 100 INJECTION, SOLUTION SUBCUTANEOUS at 13:05

## 2021-10-11 RX ADMIN — ALBUTEROL SULFATE 2 PUFF: 90 AEROSOL, METERED RESPIRATORY (INHALATION) at 08:41

## 2021-10-11 RX ADMIN — ENOXAPARIN SODIUM 30 MG: 30 INJECTION SUBCUTANEOUS at 08:41

## 2021-10-11 RX ADMIN — INSULIN ASPART 14 UNITS: 100 INJECTION, SOLUTION INTRAVENOUS; SUBCUTANEOUS at 10:52

## 2021-10-11 RX ADMIN — METRONIDAZOLE 500 MG: 500 INJECTION, SOLUTION INTRAVENOUS at 10:52

## 2021-10-11 RX ADMIN — CHOLECALCIFEROL CAP 1.25 MG (50000 UNIT) 50000 UNITS: 1.25 CAP at 20:30

## 2021-10-11 RX ADMIN — MICAFUNGIN SODIUM 100 MG: 100 INJECTION, POWDER, LYOPHILIZED, FOR SOLUTION INTRAVENOUS at 16:06

## 2021-10-11 RX ADMIN — SODIUM CHLORIDE, PRESERVATIVE FREE 3 ML: 5 INJECTION INTRAVENOUS at 08:42

## 2021-10-11 RX ADMIN — INSULIN DETEMIR 25 UNITS: 100 INJECTION, SOLUTION SUBCUTANEOUS at 21:00

## 2021-10-11 RX ADMIN — METRONIDAZOLE 500 MG: 500 INJECTION, SOLUTION INTRAVENOUS at 03:15

## 2021-10-11 RX ADMIN — FLUTICASONE PROPIONATE 1 SPRAY: 50 SPRAY, METERED NASAL at 20:30

## 2021-10-11 RX ADMIN — SODIUM CHLORIDE 100 ML/HR: 9 INJECTION, SOLUTION INTRAVENOUS at 02:11

## 2021-10-11 RX ADMIN — HYDROXYZINE HYDROCHLORIDE 25 MG: 25 TABLET ORAL at 20:30

## 2021-10-11 RX ADMIN — ALBUTEROL SULFATE 2 PUFF: 90 AEROSOL, METERED RESPIRATORY (INHALATION) at 02:10

## 2021-10-11 RX ADMIN — ALBUTEROL SULFATE 2 PUFF: 90 AEROSOL, METERED RESPIRATORY (INHALATION) at 20:31

## 2021-10-11 RX ADMIN — ALBUTEROL SULFATE 2 PUFF: 90 AEROSOL, METERED RESPIRATORY (INHALATION) at 13:05

## 2021-10-11 RX ADMIN — SODIUM CHLORIDE, PRESERVATIVE FREE 3 ML: 5 INJECTION INTRAVENOUS at 21:15

## 2021-10-11 RX ADMIN — INSULIN ASPART 10 UNITS: 100 INJECTION, SOLUTION INTRAVENOUS; SUBCUTANEOUS at 16:40

## 2021-10-11 NOTE — PLAN OF CARE
Goal Outcome Evaluation:  Plan of Care Reviewed With: patient           Outcome Summary: Patient demonstrates mild functional capacity deficits disparate from reported PLOF. She is able to complete bed mobility and transfers with supervision or less this date, but assessment of ambulation deferred due to desaturation and reported dizziness/fatigue with standing. Patient likely capable of ambulation based on strength. Will assess as appopriate/possible. Pending medical improvement, she will likely be suitable for DC home with assist.   stated

## 2021-10-11 NOTE — PROGRESS NOTES
PROGRESS NOTE         Patient Identification:  Name:  Jessica Bravo  Age:  56 y.o.  Sex:  female  :  1965  MRN:  [id number]  Visit Number:  [id number]  Primary Care Provider:  Buck Wallis APRN         LOS: 1 day       ----------------------------------------------------------------------------------------------------------------------  Subjective       Chief Complaints:    Hyperglycemia, Vomiting, and Nausea        Interval History:      Discussed case with primary RN.  Patient's oxygen requirements have increased to 6 L humidified nasal cannula.  Afebrile, no diarrhea.  CRP is significantly improved at 9.30.  WBC remains normal.  Urine culture finalized as 25,000 colonies of mixed bubba.    ----------------------------------------------------------------------------------------------------------------------      Objective       Current Hospital Meds:  albuterol sulfate HFA, 2 puff, Inhalation, 4x Daily - RT  aspirin, 81 mg, Oral, Daily  dexamethasone, 6 mg, Intravenous, Daily  enoxaparin, 30 mg, Subcutaneous, Daily  insulin aspart, 0-14 Units, Subcutaneous, TID AC  insulin detemir, 25 Units, Subcutaneous, Q12H  [START ON 10/12/2021] levoFLOXacin, 500 mg, Intravenous, Q48H  metroNIDAZOLE, 500 mg, Intravenous, Q8H  micafungin (MYCAMINE) IV, 100 mg, Intravenous, Q24H  sodium chloride, 3 mL, Intravenous, Q12H      Pharmacy to Dose enoxaparin (LOVENOX),   IV Fluids 1000 mL + additives, 75 mL/hr, Last Rate: 75 mL/hr (10/11/21 1052)      ----------------------------------------------------------------------------------------------------------------------    Vital Signs:  Temp:  [97.8 °F (36.6 °C)-98.6 °F (37 °C)] 97.8 °F (36.6 °C)  Heart Rate:  [67-75] 69  Resp:  [20] 20  BP: (163-171)/(66-78) 167/73  Mean Arterial Pressure (Non-Invasive) for the past 24 hrs (Last 3 readings):   Noninvasive MAP (mmHg)   10/10/21 1822 95     SpO2 Percentage    10/11/21 0215 10/11/21 0305 10/11/21 0723   SpO2: 94% 90%  96%  Comment: 2350     SpO2:  [90 %-96 %] 96 %  on  Flow (L/min):  [5-6] 6;   Device (Oxygen Therapy): humidified; nasal cannula    Body mass index is 27.46 kg/m².  Wt Readings from Last 3 Encounters:   10/10/21 72.6 kg (160 lb)   02/05/20 78 kg (172 lb)   10/18/18 79.2 kg (174 lb 9.6 oz)        Intake/Output Summary (Last 24 hours) at 10/11/2021 1240  Last data filed at 10/11/2021 0215  Gross per 24 hour   Intake 640 ml   Output 1200 ml   Net -560 ml     Diet Regular; Cardiac, Consistent Carbohydrate, Renal  ----------------------------------------------------------------------------------------------------------------------      Physical Exam:    Deferred due to COVID-19 isolation.  ----------------------------------------------------------------------------------------------------------------------  Results from last 7 days   Lab Units 10/10/21  0448 10/09/21  1522   TROPONIN T ng/mL <0.010 0.016     Results from last 7 days   Lab Units 10/09/21  1522   PROBNP pg/mL 581.1       Results from last 7 days   Lab Units 10/09/21  2227   PH, ARTERIAL pH units 7.368   PO2 ART mm Hg 57.6*   PCO2, ARTERIAL mm Hg 37.1   HCO3 ART mmol/L 21.3     Results from last 7 days   Lab Units 10/11/21  0648 10/10/21  0448 10/09/21  2004 10/09/21  1522   CRP mg/dL 9.30*  --   --  29.13*   LACTATE mmol/L  --  1.7 2.7* 2.8*   WBC 10*3/mm3 9.10 7.55  --  10.15   HEMOGLOBIN g/dL 10.3* 10.5*  --  11.8*   HEMATOCRIT % 32.8* 33.7*  --  37.7   MCV fL 84.1 84.9  --  84.9   MCHC g/dL 31.4* 31.2*  --  31.3*   PLATELETS 10*3/mm3 359 326  --  365     Results from last 7 days   Lab Units 10/11/21  0648 10/10/21  0448 10/09/21  2218 10/09/21  1522 10/09/21  1522   SODIUM mmol/L 129* 127* 131*   < > 128*   POTASSIUM mmol/L 3.9 4.0 4.1   < > 3.8   MAGNESIUM mg/dL 1.8 2.1  --   --  2.1   CHLORIDE mmol/L 99 92* 95*   < > 87*   CO2 mmol/L 16.3* 16.6* 20.3*   < > 19.8*   BUN mg/dL 58* 62* 61*   < > 61*   CREATININE mg/dL 2.94* 3.18* 3.59*   < > 3.90*   EGFR  IF NONAFRICN AM mL/min/1.73 16* 15* 13*   < > 12*   CALCIUM mg/dL 8.5* 8.6 8.3*   < > 8.6   GLUCOSE mg/dL 530* 425* 425*   < > 534*   ALBUMIN g/dL 2.56* 2.77*  --   --  3.19*   BILIRUBIN mg/dL 0.3 0.4  --   --  0.5   ALK PHOS U/L 69 74  --   --  92   AST (SGOT) U/L 11 14  --   --  16   ALT (SGPT) U/L 9 11  --   --  13    < > = values in this interval not displayed.   Estimated Creatinine Clearance: 20.4 mL/min (A) (by C-G formula based on SCr of 2.94 mg/dL (H)).  No results found for: AMMONIA    Hemoglobin A1C   Date/Time Value Ref Range Status   10/09/2021 1522 10.50 (H) 4.80 - 5.60 % Final     Glucose   Date/Time Value Ref Range Status   10/11/2021 1032 446 (C) 70 - 130 mg/dL Final     Comment:     RN Notified Meter: [id number] : [id number] Roberta Rivera   10/11/2021 0728 435 (C) 70 - 130 mg/dL Final     Comment:     Confirmed by Repeat RN Notified Meter: [id number] : [id number] [employer]   10/10/2021 2019 384 (H) 70 - 130 mg/dL Final     Comment:     Meter: [id number] : [id number] frankel crystal   10/10/2021 1823 377 (H) 70 - 130 mg/dL Final     Comment:     Meter: [id number] : [id number] Roberta Rivera   10/10/2021 1717 437 (C) 70 - 130 mg/dL Final     Comment:     Critical repeat  Meter: [id number] : [id number] MEGHNA HENRY   10/10/2021 1130 364 (H) 70 - 130 mg/dL Final     Comment:     Meter: [id number] : [id number] MEGHNA HENRY   10/10/2021 0734 470 (C) 70 - 130 mg/dL Final     Comment:     Confirmed by Repeat Meter: [id number] : [id number] MICHAEL HINDS   10/10/2021 0221 368 (H) 70 - 130 mg/dL Final     Comment:     Meter: [id number] : [id number] SHASHANK WILKERSON     Lab Results   Component Value Date    HGBA1C 10.50 (H) 10/09/2021     Lab Results   Component Value Date    TSH 0.397 10/09/2021       Blood Culture   Date Value Ref Range Status   10/09/2021 No growth at 24 hours  Preliminary   10/09/2021 No growth at 24 hours  Preliminary     Urine Culture   Date Value Ref Range Status    10/09/2021 25,000 CFU/mL Mixed Bubba Isolated  Final     No results found for: WOUNDCX  No results found for: STOOLCX  No results found for: RESPCX  Pain Management Panel    There is no flowsheet data to display.           ----------------------------------------------------------------------------------------------------------------------  Imaging Results (Last 24 Hours)       Procedure Component Value Units Date/Time    US Renal Bilateral [[id number]] Collected: 10/10/21 1432     Updated: 10/10/21 1435    Narrative:      EXAMINATION: US RENAL BILATERAL-      CLINICAL INDICATION:     arf; J18.9-Pneumonia, unspecified organism;  R73.9-Hyperglycemia, unspecified     TECHNIQUE: Multiplanar gray scale sonographic imaging of the kidneys.  Color Doppler implemented.     COMPARISON: CT 2015      FINDINGS:      RIGHT: The right kidney measures 6.8 cm in length. Relatively atrophic  compared to the left kidney and has been previously described on prior  CT scan.     LEFT: The left kidney measures 12.0 cm in length. No mass,  hydronephrosis, or shadowing stone.       Impression:      1. Atrophy right kidney.  2. Compensatory hypertrophy left kidney. Otherwise unremarkable renal  ultrasound.      This report was finalized on 10/10/2021 2:33 PM by Dr. Michael Mojica MD.               ----------------------------------------------------------------------------------------------------------------------    Assessment/Plan       Assessment/Plan     ASSESSMENT:    1.  Severe sepsis with lactic acid greater than 2 on admission  2.  COVID-19 pneumonia   3.  Superimposed bacterial pneumonia  4.  UTI    PLAN:    Discussed case with primary RN.  Patient's oxygen requirements have increased to 6 L humidified nasal cannula.  Afebrile, no diarrhea.  CRP is significantly improved at 9.30.  WBC remains normal.  Urine culture finalized as 25,000 colonies of mixed bubba.    Urinalysis on 10/9/2021 was positive with 31-50 WBCs, 1+ bacteria,  and 3+ budding yeast.  Urine culture is in process.  Nuclear medicine lung scan on 10/9/2021 showed low probability of PE on the basis of perfusion only nuclear medicine study.  CT chest without contrast on 10/9/2021 showed extensive patchy groundglass infiltrates scattered throughout both lungs, consistent with multifocal pneumonia.  COVID-19 reporting criteria: Typical.  Commonly reported imaging features of COVID-19 pneumonia are present.  Other processes such as influenza pneumonia and organizing pneumonia can cause a similar imaging pattern.  Hepatic steatosis.  Multiple prominent mediastinal lymph nodes are likely reactive.  Follow-up recommended.  Chest x-ray on 10/9/2021 showed extensive bilateral patchy airspace disease.  Commonly reported imaging features of COVID-19 pneumonia are present.  Other processes such as influenza pneumonia and organizing pneumonia can cause a similar imaging pattern.  COVID-19 and flu A/B PCR on 10/9/2021 was positive.  Mycoplasma pneumonia antibody on 10/10/2021 was negative.  Strep pneumo antigen on 10/10/2021 is in process.  Blood cultures on 10/9/2021 are in process.     Would recommend Decadron, as patient is requiring supplemental oxygen.  Recommend to consider remdesivir even in the setting of end-stage renal disease as benefit may outweigh risk.     For now, we are agreeable with Levaquin and Flagyl for pneumonia coverage.  As urinanalysis showed 3+ budding yeast, recommend to continue on micafungin 100 mg IV every 24 hours.  We will follow closely and adjust antibiotic therapy as appropriate.    Code Status:   Code Status and Medical Interventions:   Ordered at: 10/10/21 0104     Level Of Support Discussed With:    Patient     Code Status:    CPR     Medical Interventions (Level of Support Prior to Arrest):    Full       JOHN Holm  10/11/21  12:40 EDT

## 2021-10-11 NOTE — PLAN OF CARE
Goal Outcome Evaluation:             The patients oxygen has to be increased overnight to 6 liters per respiratory. She has rested comfortably in bed overnight. No new complaints. Will continue to monitor.

## 2021-10-11 NOTE — PROGRESS NOTES
Nephrology Progress Note      Subjective     Patient on 6L of O2, a slight increase overnight. No chest pain     Objective       Vital signs :     Temp:  [97.8 °F (36.6 °C)-98.6 °F (37 °C)] 97.8 °F (36.6 °C)  Heart Rate:  [67-75] 69  Resp:  [20] 20  BP: (163-171)/(66-78) 167/73      Intake/Output Summary (Last 24 hours) at 10/11/2021 0815  Last data filed at 10/11/2021 0215  Gross per 24 hour   Intake 740 ml   Output 1600 ml   Net -860 ml       Physical Exam:    General: not in acute distress  Heart: Tele reveals sinus rhythm.   Lungs: Respirations appear to be regular, even and unlabored with no signs of respiratory distress. No audible wheezing.    Abdomen: no distension  Extremities: none  Skin: No visible bleeding, bruising, or rash.  Neurologic: no apparent focal deficit.     Laboratory Data :     Albumin Albumin   Date Value Ref Range Status   10/11/2021 2.56 (L) 3.50 - 5.20 g/dL Final   10/10/2021 2.77 (L) 3.50 - 5.20 g/dL Final   10/09/2021 3.19 (L) 3.50 - 5.20 g/dL Final      Magnesium Magnesium   Date Value Ref Range Status   10/11/2021 1.8 1.6 - 2.6 mg/dL Final   10/10/2021 2.1 1.6 - 2.6 mg/dL Final   10/09/2021 2.1 1.6 - 2.6 mg/dL Final          PTH               No results found for: PTH    CBC and coagulation:  Results from last 7 days   Lab Units 10/11/21  0648 10/10/21  1245 10/10/21  0448 10/09/21  2004 10/09/21  1522   PROCALCITONIN ng/mL  --  0.61*  --   --   --    LACTATE mmol/L  --   --  1.7 2.7* 2.8*   CRP mg/dL 9.30*  --   --   --  29.13*   WBC 10*3/mm3 9.10  --  7.55  --  10.15   HEMOGLOBIN g/dL 10.3*  --  10.5*  --  11.8*   HEMATOCRIT % 32.8*  --  33.7*  --  37.7   MCV fL 84.1  --  84.9  --  84.9   MCHC g/dL 31.4*  --  31.2*  --  31.3*   PLATELETS 10*3/mm3 359  --  326  --  365   D DIMER QUANT MCGFEU/mL  --   --   --  2.82* 3.09*     Acid/base balance:  Results from last 7 days   Lab Units 10/09/21  2227 10/09/21  1529   PH, ARTERIAL pH units 7.368 7.386   PO2 ART mm Hg 57.6* 43.1*   PCO2,  ARTERIAL mm Hg 37.1 34.4*   HCO3 ART mmol/L 21.3 20.6     Renal and electrolytes:  Results from last 7 days   Lab Units 10/11/21  0648 10/10/21  0448 10/09/21  2218 10/09/21  1522 10/09/21  1522   SODIUM mmol/L 129* 127* 131*  --  128*   POTASSIUM mmol/L 3.9 4.0 4.1   < > 3.8   MAGNESIUM mg/dL 1.8 2.1  --   --  2.1   CHLORIDE mmol/L 99 92* 95*   < > 87*   CO2 mmol/L 16.3* 16.6* 20.3*   < > 19.8*   BUN mg/dL 58* 62* 61*   < > 61*   CREATININE mg/dL 2.94* 3.18* 3.59*  --  3.90*   EGFR IF NONAFRICN AM mL/min/1.73 16* 15* 13*   < > 12*   CALCIUM mg/dL 8.5* 8.6 8.3*   < > 8.6   PHOSPHORUS mg/dL  --  4.1 3.4  --   --     < > = values in this interval not displayed.     Estimated Creatinine Clearance: 20.4 mL/min (A) (by C-G formula based on SCr of 2.94 mg/dL (H)).    Liver and pancreatic function:  Results from last 7 days   Lab Units 10/11/21  0648 10/10/21  0448 10/09/21  1522   ALBUMIN g/dL 2.56* 2.77* 3.19*   BILIRUBIN mg/dL 0.3 0.4 0.5   ALK PHOS U/L 69 74 92   AST (SGOT) U/L 11 14 16   ALT (SGPT) U/L 9 11 13         Cardiac:  Results from last 7 days   Lab Units 10/09/21  1522   PROBNP pg/mL 581.1     Liver and pancreatic function:  Results from last 7 days   Lab Units 10/11/21  0648 10/10/21  0448 10/09/21  1522   ALBUMIN g/dL 2.56* 2.77* 3.19*   BILIRUBIN mg/dL 0.3 0.4 0.5   ALK PHOS U/L 69 74 92   AST (SGOT) U/L 11 14 16   ALT (SGPT) U/L 9 11 13       Medications :     albuterol sulfate HFA, 2 puff, Inhalation, 4x Daily - RT  aspirin, 81 mg, Oral, Daily  enoxaparin, 30 mg, Subcutaneous, Daily  insulin aspart, 0-7 Units, Subcutaneous, TID AC  insulin detemir, 10 Units, Subcutaneous, Nightly  [START ON 10/12/2021] levoFLOXacin, 500 mg, Intravenous, Q48H  metroNIDAZOLE, 500 mg, Intravenous, Q8H  micafungin (MYCAMINE) IV, 100 mg, Intravenous, Q24H  sodium chloride, 3 mL, Intravenous, Q12H      Pharmacy to Dose enoxaparin (LOVENOX),   sodium chloride, 100 mL/hr, Last Rate: 100 mL/hr (10/11/21  3971)          Assessment/Plan     1-Acute kidney injury on chronic kidney disease stage IV: Cr is better today to 2.9, awaiting record from Dr. Araiza office. Will DC NS and change to more balance bicarb fluid for  Persistent metabolic acidosis as below. FU renal USG and UPCR  Stated patient had CKD stage IV and follows Dr. Araiza and was on dialysis for 3 times and was off dialysis.      2-Hyponatremia; most likely pseudohyponatremia corrected sodium is 133     3-hyperglycemia: Patient needs better control of sugars     4. Covid pneumonia: Hospitalist managing it     5-metabolic acidosis: DC NS and start  On 1/2NS with sodium bicarb 75meq/l for non gap metabolic acidosis      Eusebio Duarte MD  10/11/21  08:15 EDT

## 2021-10-11 NOTE — THERAPY EVALUATION
Acute Care - Physical Therapy Initial Evaluation   Christiano     Patient Name: Jessica Bravo  : 1965  MRN: [id number]  Today's Date: 10/11/2021   Onset of Illness/Injury or Date of Surgery: 10/09/21  Visit Dx:     ICD-10-CM ICD-9-CM   1. Pneumonia of both lower lobes due to infectious organism  J18.9 486   2. Hyperglycemia  R73.9 790.29     Patient Active Problem List   Diagnosis   • Essential hypertension   • Lower extremity edema   • Pneumonia of both lower lobes due to infectious organism     Past Medical History:   Diagnosis Date   • Chronic kidney disease     only has one kidney, [facility]    • ESRD on hemodialysis (HCC)    • Essential hypertension    • Hepatic steatosis    • History of noncompliance with medical treatment    • Pre-transplant evaluation for kidney transplant 2021    Declined by the [facility]   • Single kidney    • Type 2 diabetes mellitus (HCC)      Past Surgical History:   Procedure Laterality Date   • HYSTERECTOMY       PT Assessment (last 12 hours)     PT Evaluation and Treatment     Row Name 10/11/21 1529          Physical Therapy Time and Intention    Subjective Information complains of; fatigue; dyspnea; dizziness  -CW     Document Type evaluation  -CW     Mode of Treatment physical therapy  -CW     Patient Effort adequate  -CW     Symptoms Noted During/After Treatment fatigue; shortness of breath; dizziness  -CW     Comment Patient agreeable to physical therapy evaluation this date. She reports that she was independent prior to this episode of care. She lives with her boyfrend who is able to assist her if necessary.  -CW     Row Name 10/11/21 1529          General Information    Patient Profile Reviewed yes  -CW     Onset of Illness/Injury or Date of Surgery 10/09/21  -CW     Referring Physician Oculam  -CW     Patient Observations alert; cooperative; agree to therapy  -CW     Prior Level of Function independent:  -CW      Equipment Currently Used at Home none  -CW     Row Name 10/11/21 1529          Previous Level of Function/Home Environm    BADLs, Premorbid Functional Level independent  -CW     Household Ambulation, Premorbid Functional Level independent  -CW     Row Name 10/11/21 1529          Living Environment    Current Living Arrangements home/apartment/condo  -CW     Lives With significant other  -CW     Row Name 10/11/21 1529          Cognition    Affect/Mental Status (Cognitive) WFL  -CW     Orientation Status (Cognition) oriented x 4  -CW     Follows Commands (Cognition) WFL  -CW     Row Name 10/11/21 1529          Pain Scale: Word Pre/Post-Treatment    Pre/Posttreatment Pain Comment Patient reports back pain chronic in nature at rest, not significantly exacerbated with mobility.  -CW     Pain Intervention(s) Repositioned; Ambulation/increased activity  -CW     Row Name 10/11/21 1529          Range of Motion Comprehensive    Comment, General Range of Motion AROM grossly WFL  -CW     Row Name 10/11/21 1529          Strength Comprehensive (MMT)    Comment, General Manual Muscle Testing (MMT) Assessment BLE MMT grossly 5/5  -CW     Row Name 10/11/21 1529          Bed Mobility    Bed Mobility bed mobility (all) activities  -CW     All Activities, Industry (Bed Mobility) modified independence  -CW     Assistive Device (Bed Mobility) bed rails  -CW     Row Name 10/11/21 1529          Transfers    Transfers sit-stand transfer; stand-sit transfer  -CW     Sit-Stand Industry (Transfers) supervision  -CW     Stand-Sit Industry (Transfers) supervision  -CW     Row Name 10/11/21 1529          Gait/Stairs (Locomotion)    Industry Level (Gait) standby assist  -CW     Distance in Feet (Gait) 4  side stepping at side of bed  -CW     Pattern (Gait) step-to  -CW     Deviations/Abnormal Patterns (Gait) gait speed decreased  -CW     Comment (Gait/Stairs) Forward gait assessment deferred this date.  -CW     Row Name 10/11/21  1529          Safety Issues, Functional Mobility    Impairments Affecting Function (Mobility) pain; shortness of breath; endurance/activity tolerance  -     Row Name 10/11/21 1529          Balance    Balance Assessment sitting static balance; standing static balance  -CW     Static Sitting Balance WFL  -     Static Standing Balance WFL  -     Row Name 10/11/21 1529          Coping    Observed Emotional State calm; cooperative  -     Trust Relationship/Rapport care explained; choices provided; questions answered; thoughts/feelings acknowledged  -     Row Name 10/11/21 1529          Plan of Care Review    Plan of Care Reviewed With patient  -CW     Outcome Summary Patient demonstrates mild functional capacity deficits disparate from reported PLOF. She is able to complete bed mobility and transfers with supervision or less this date, but assessment of ambulation deferred due to desaturation and reported dizziness/fatigue with standing. Patient likely capable of ambulation based on strength. Will assess as appopriate/possible. Pending medical improvement, she will likely be suitable for DC home with assist.  -     Row Name 10/11/21 1529          Vital Signs    Intra SpO2 (%) 82  during standing  -CW     O2 Delivery Intra Treatment nasal cannula  -     Row Name 10/11/21 1529          Physical Therapy Goals    Gait Training Goal Selection (PT) gait training, PT goal 1  -     Row Name 10/11/21 1529          Gait Training Goal 1 (PT)    Activity/Assistive Device (Gait Training Goal 1, PT) gait (walking locomotion); decrease fall risk; diminish gait deviation; improve balance and speed; increase endurance/gait distance  -CW     [place] Level (Gait Training Goal 1, PT) independent  -CW     Distance (Gait Training Goal 1, PT) 500  -CW     Time Frame (Gait Training Goal 1, PT) by discharge  -     Row Name 10/11/21 1529          Positioning and Restraints    Pre-Treatment Position in bed  -CW     Post  Treatment Position bed  -CW     In Bed supine; call light within reach; encouraged to call for assist  -CW     Row Name 10/11/21 1525          Therapy Assessment/Plan (PT)    Patient/Family Therapy Goals Statement (PT) Patient wishes to go home.  -CW     PT Diagnosis (PT) Functional Capacity Deficits  -CW     Rehab Potential (PT) good, to achieve stated therapy goals  -CW     Criteria for Skilled Interventions Met (PT) yes; meets criteria  -CW     Row Name 10/11/21 152          Therapy Plan Review/Discharge Plan (PT)    Therapy Plan Review (PT) evaluation/treatment results reviewed; care plan/treatment goals reviewed  -CW           User Key  (r) = Recorded By, (t) = Taken By, (c) = Cosigned By    Initials Name Provider Type    Josue Coley PT Physical Therapist                PT Recommendation and Plan  Anticipated Discharge Disposition (PT): home with assist  Planned Therapy Interventions (PT): balance training, gait training, home exercise program, patient/family education, stair training, strengthening  Therapy Frequency (PT): 3 times/wk (3-5 times/week)  Plan of Care Reviewed With: patient  Outcome Summary: Patient demonstrates mild functional capacity deficits disparate from reported PLOF. She is able to complete bed mobility and transfers with supervision or less this date, but assessment of ambulation deferred due to desaturation and reported dizziness/fatigue with standing. Patient likely capable of ambulation based on strength. Will assess as appopriate/possible. Pending medical improvement, she will likely be suitable for DC home with assist.       Time Calculation:    PT Charges     Row Name 10/11/21 1543             Time Calculation    PT Received On 10/11/21  -CW      PT Goal Re-Cert Due Date 10/25/21  -CW            User Key  (r) = Recorded By, (t) = Taken By, (c) = Cosigned By    Initials Name Provider Type    Josue Coley PT Physical Therapist              Therapy Charges for Today      Code Description Service Date Service Provider Modifiers Qty    28673860321 HC PT EVAL MOD COMPLEXITY 4 10/11/2021 Josue Alberto, PT GP 1    83517670231 HC PT EVAL MOD COMPLEXITY 4 10/11/2021 Josue Alberto, PT GP 1               Josue Alberto, PT  10/11/2021

## 2021-10-11 NOTE — CONSULTS
"Diabetes Education  Assessment/Teaching    Patient Name:  Jessica Bravo  YOB: 1965  MRN: [id number]  Admit Date:  10/9/2021      Assessment Date:  10/11/2021    Most Recent Value   General Information     Height 162.6 cm (64\")   Height Method Stated   Weight 72.6 kg (160 lb)   Weight Method Bed scale   Pregnancy Assessment    Diabetes History    Education Preferences    Nutrition Information    Assessment Topics    DM Goals             Most Recent Value   DM Education Needs    Meter Has own   Frequency of Testing 3 times a day   Medication Insulin   Problem Solving Hypoglycemia,  Hyperglycemia,  Sick days,  Signs,  Symptoms,  Treatment   Reducing Risks Cardiovascular,  Immunizations,  Foot care,  Dental exam,  Eye exam,  Blood pressure,  Lipids,  Neuropathy,  Retinopathy,  A1C testing   Healthy Eating Basic meal plan provided   Physical Activity Walking   Physical Activity Frequency Regularly   Healthy Coping Appropriate   Discharge Plan Home,  Follow-up with PCP   Motivation Moderate   Teaching Method Explanation,  Discussion,  Handouts   Patient Response Verbalized understanding            Other Comments:  A1C 10.5 PPE was worn and patient was seen.   Patient was educated and received ADA handouts on diet, activity, checking blood glucose, taking medication as prescribed, checking feet daily and S/S of hypo/hyperglycemia. Patient was educated on sick rule days. Patient had no questions or concerns . Please re-consult or call for concerns or questions. Thank you.      Electronically signed by:  Roberta Rivera RN  10/11/21 15:32 EDT  "

## 2021-10-11 NOTE — PLAN OF CARE
Goal Outcome Evaluation:           Progress: no change  Outcome Summary: Patient  has done well today.  O2 remains on 6L/nc with sats in the mid 90s.  Will continue to monitor.

## 2021-10-11 NOTE — CASE MANAGEMENT/SOCIAL WORK
Discharge Planning Assessment   Christiano     Patient Name: Jessica Bravo  MRN: [id number]  Today's Date: 10/11/2021    Admit Date: 10/9/2021     Discharge Needs Assessment     Row Name 10/11/21 1600       Living Environment    Lives With significant other    Name(s) of Who Lives With Patient Vaughn    Current Living Arrangements home/apartment/condo    Primary Care Provided by self    Provides Primary Care For no one    Family Caregiver if Needed significant other    Quality of Family Relationships unable to assess    Able to Return to Prior Arrangements yes       Resource/Environmental Concerns    Resource/Environmental Concerns none       Transition Planning    Patient/Family Anticipates Transition to home with family    Patient/Family Anticipated Services at Transition none    Transportation Anticipated family or friend will provide       Discharge Needs Assessment    Equipment Currently Used at Home cane, straight; glucometer; walker, standard    Concerns to be Addressed no discharge needs identified    Anticipated Changes Related to Illness none    Equipment Needed After Discharge none               Discharge Plan     Row Name 10/11/21 3428       Plan    Plan SS spoke with pt's sister, [place]. Pt lives with significant other, Vaughn. Pt does not utilize home health services at this time. Pt has standard walker, cane, and glucometer at home. PCP is Buck Wallis. Pt does not have a POA or living will on file. Pt's significant other to provide transportation home at discharge. Pt may need rehab services. SS to follow and assist.    Patient/Family in Agreement with Plan yes               Demographic Summary     Row Name 10/11/21 1602       General Information    Referral Source physician    Reason for Consult --  been ill with covid-19 and bacterial pneumonia for at least 3 weeks, has generalized weakness; she may need home oxygen plus or minus inpatient rehab              NAZARIO Dennis

## 2021-10-11 NOTE — PROGRESS NOTES
[facility]     Patient Identification:  Name:  Jessica Bravo  Age:  56 y.o.  Sex:  female  :  1965  MRN:  [id number]  Visit Number:  [id number]  Primary Care Provider:  Buck Wallis APRN    Length of stay:  1    Subjective: Patient seen and examined, patient is in bed, complaining of pain in her chest when she coughs no nausea or vomiting, she is also hyperglycemic.  So far O2 saturation is maintained at 96% on 6 L nasal cannula.  Denies loss of smell or taste no diarrhea.    Chief Complaint: Short of breath and cough  ----------------------------------------------------------------------------------------------------------------------  Current Hospital Meds:  albuterol sulfate HFA, 2 puff, Inhalation, 4x Daily - RT  aspirin, 81 mg, Oral, Daily  dexamethasone, 6 mg, Intravenous, Daily  enoxaparin, 30 mg, Subcutaneous, Daily  insulin aspart, 0-14 Units, Subcutaneous, TID AC  insulin detemir, 25 Units, Subcutaneous, Q12H  [START ON 10/12/2021] levoFLOXacin, 500 mg, Intravenous, Q48H  metroNIDAZOLE, 500 mg, Intravenous, Q8H  micafungin (MYCAMINE) IV, 100 mg, Intravenous, Q24H  sodium chloride, 3 mL, Intravenous, Q12H      IV Fluids 1000 mL + additives, 75 mL/hr, Last Rate: 75 mL/hr (10/11/21 1052)      ----------------------------------------------------------------------------------------------------------------------  Vital Signs:  Temp:  [97.8 °F (36.6 °C)-98.6 °F (37 °C)] 97.8 °F (36.6 °C)  Heart Rate:  [67-75] 69  Resp:  [20] 20  BP: (163-171)/(66-78) 167/73       Tele: Sinus rhythm 63 bpm      10/09/21  1515 10/10/21  0205   Weight: 74.4 kg (164 lb) 72.6 kg (160 lb)     Body mass index is 27.46 kg/m².    Intake/Output Summary (Last 24 hours) at 10/11/2021 1649  Last data filed at 10/11/2021 1641  Gross per 24 hour   Intake 3388 ml   Output 1300 ml   Net 2088 ml     Diet Regular; Cardiac, Consistent Carbohydrate,  Renal  ----------------------------------------------------------------------------------------------------------------------  Physical exam:  General: Chronically ill-appearing, she is in bed, she is not orthopneic,awake, alert, oriented to self, place, and time, No respiratory distress.    Skin:  Skin is warm and dry. No rash noted. No pallor.    HENT:  Head:  Normocephalic and atraumatic.  Mouth:  Moist mucous membranes.    Eyes:  Conjunctivae and EOM are normal.  Pupils are equal, round, and reactive to light.  No scleral icterus.    Neck:  Neck supple.  No JVD present.    Pulmonary/Chest: Subjective tenderness of the chest on palpation, no rash, no lesions.  No respiratory distress, no wheezes, no crackles, with normal breath sounds and good air movement.  Cardiovascular:  Normal rate, regular rhythm and normal heart sounds with no murmur.  Abdominal:  Soft.  Bowel sounds are normal.  No distension and no tenderness.   Extremities:  No edema, no tenderness, and no deformity.  No red or swollen joints anywhere.  Strong pulses in all 4 extremities with no clubbing, no cyanosis, no edema.  Neurological:  Motor strength equal no obvious deficit, sensory grossly intact.   No cranial nerve deficit.  No tongue deviation.  No facial droop.  No slurred speech.    Genitourinary: No Medina catheter  Back:  ----------------------------------------------------------------------------------------------------------------------  ----------------------------------------------------------------------------------------------------------------------  Results from last 7 days   Lab Units 10/10/21  0448 10/09/21  1522   TROPONIN T ng/mL <0.010 0.016     Results from last 7 days   Lab Units 10/11/21  0648 10/10/21  0448 10/09/21  2004 10/09/21  1522   CRP mg/dL 9.30*  --   --  29.13*   LACTATE mmol/L  --  1.7 2.7* 2.8*   WBC 10*3/mm3 9.10 7.55  --  10.15   HEMOGLOBIN g/dL 10.3* 10.5*  --  11.8*   HEMATOCRIT % 32.8* 33.7*  --  37.7    MCV fL 84.1 84.9  --  84.9   MCHC g/dL 31.4* 31.2*  --  31.3*   PLATELETS 10*3/mm3 359 326  --  365     Results from last 7 days   Lab Units 10/09/21  2227   PH, ARTERIAL pH units 7.368   PO2 ART mm Hg 57.6*   PCO2, ARTERIAL mm Hg 37.1   HCO3 ART mmol/L 21.3     Results from last 7 days   Lab Units 10/11/21  0648 10/10/21  0448 10/09/21  2218 10/09/21  1522 10/09/21  1522   SODIUM mmol/L 129* 127* 131*   < > 128*   POTASSIUM mmol/L 3.9 4.0 4.1   < > 3.8   MAGNESIUM mg/dL 1.8 2.1  --   --  2.1   CHLORIDE mmol/L 99 92* 95*   < > 87*   CO2 mmol/L 16.3* 16.6* 20.3*   < > 19.8*   BUN mg/dL 58* 62* 61*   < > 61*   CREATININE mg/dL 2.94* 3.18* 3.59*   < > 3.90*   EGFR IF NONAFRICN AM mL/min/1.73 16* 15* 13*   < > 12*   CALCIUM mg/dL 8.5* 8.6 8.3*   < > 8.6   GLUCOSE mg/dL 530* 425* 425*   < > 534*   ALBUMIN g/dL 2.56* 2.77*  --   --  3.19*   BILIRUBIN mg/dL 0.3 0.4  --   --  0.5   ALK PHOS U/L 69 74  --   --  92   AST (SGOT) U/L 11 14  --   --  16   ALT (SGPT) U/L 9 11  --   --  13    < > = values in this interval not displayed.   Estimated Creatinine Clearance: 20.4 mL/min (A) (by C-G formula based on SCr of 2.94 mg/dL (H)).    No results found for: AMMONIA      Blood Culture   Date Value Ref Range Status   10/09/2021 No growth at 2 days  Preliminary   10/09/2021 No growth at 2 days  Preliminary     Urine Culture   Date Value Ref Range Status   10/09/2021 25,000 CFU/mL Mixed Nikki Isolated  Final     No results found for: WOUNDCX  No results found for: STOOLCX    I have personally looked at the labs and they are summarized above.  ----------------------------------------------------------------------------------------------------------------------  Imaging Results (Last 24 Hours)     ** No results found for the last 24 hours. **        ----------------------------------------------------------------------------------------------------------------------  Assessment and Plan:  -Sepsis present on admission secondary  COVID-19 pneumonia  -COVID-19 pneumonia  -Acute hypoxic respiratory failure from pneumonia  -Acute kidney injury on CKD stage IV  -Pseudohyponatremia  -Diabetes type 2 insulin requiring with hyperglycemia    Patient on Decadron, of Remdesevir acute renal failure, awaiting for nephrology's recommendation regarding patient's acute kidney injury on CKD stage IV, infectious  disease help appreciated, Levemir has been increased, insulin coverage also increased.  Monitor respiratory status, oxygen supplementation.  Blood culture so far no growth.  Repeat BMP in the morning we will also order ABG or VBG.    Disposition pending improvement      Brianna Craven MD  10/11/21  16:49 EDT

## 2021-10-12 LAB
A-A DO2: 198.5 MMHG (ref 0–300)
ANION GAP SERPL CALCULATED.3IONS-SCNC: 11.3 MMOL/L (ref 5–15)
ARTERIAL PATENCY WRIST A: ABNORMAL
ATMOSPHERIC PRESS: 725 MMHG
BASE EXCESS BLDA CALC-SCNC: -0.9 MMOL/L (ref 0–2)
BASOPHILS # BLD AUTO: 0 10*3/MM3 (ref 0–0.2)
BASOPHILS NFR BLD AUTO: 0 % (ref 0–1.5)
BDY SITE: ABNORMAL
BODY TEMPERATURE: 0 C
BUN SERPL-MCNC: 47 MG/DL (ref 6–20)
BUN/CREAT SERPL: 20.4 (ref 7–25)
CALCIUM SPEC-SCNC: 8.5 MG/DL (ref 8.6–10.5)
CHLORIDE SERPL-SCNC: 101 MMOL/L (ref 98–107)
CO2 BLDA-SCNC: 23.7 MMOL/L (ref 22–33)
CO2 SERPL-SCNC: 20.7 MMOL/L (ref 22–29)
COHGB MFR BLD: 1.1 % (ref 0–5)
CREAT SERPL-MCNC: 2.3 MG/DL (ref 0.57–1)
CRP SERPL-MCNC: 6.01 MG/DL (ref 0–0.5)
DEPRECATED RDW RBC AUTO: 42.8 FL (ref 37–54)
EOSINOPHIL # BLD AUTO: 0 10*3/MM3 (ref 0–0.4)
EOSINOPHIL NFR BLD AUTO: 0 % (ref 0.3–6.2)
ERYTHROCYTE [DISTWIDTH] IN BLOOD BY AUTOMATED COUNT: 13.8 % (ref 12.3–15.4)
GAS FLOW AIRWAY: 6 LPM
GFR SERPL CREATININE-BSD FRML MDRD: 22 ML/MIN/1.73
GLUCOSE BLDC GLUCOMTR-MCNC: 143 MG/DL (ref 70–130)
GLUCOSE BLDC GLUCOMTR-MCNC: 239 MG/DL (ref 70–130)
GLUCOSE BLDC GLUCOMTR-MCNC: 241 MG/DL (ref 70–130)
GLUCOSE BLDC GLUCOMTR-MCNC: 277 MG/DL (ref 70–130)
GLUCOSE SERPL-MCNC: 264 MG/DL (ref 65–99)
HCO3 BLDA-SCNC: 22.7 MMOL/L (ref 20–26)
HCT VFR BLD AUTO: 30.9 % (ref 34–46.6)
HCT VFR BLD CALC: 30.8 % (ref 38–51)
HGB BLD-MCNC: 9.5 G/DL (ref 12–15.9)
HGB BLDA-MCNC: 10.1 G/DL (ref 13.5–17.5)
IMM GRANULOCYTES # BLD AUTO: 0.11 10*3/MM3 (ref 0–0.05)
IMM GRANULOCYTES NFR BLD AUTO: 1.9 % (ref 0–0.5)
INHALED O2 CONCENTRATION: 44 %
LYMPHOCYTES # BLD AUTO: 0.42 10*3/MM3 (ref 0.7–3.1)
LYMPHOCYTES NFR BLD AUTO: 7.2 % (ref 19.6–45.3)
Lab: ABNORMAL
MCH RBC QN AUTO: 26 PG (ref 26.6–33)
MCHC RBC AUTO-ENTMCNC: 30.7 G/DL (ref 31.5–35.7)
MCV RBC AUTO: 84.4 FL (ref 79–97)
METHGB BLD QL: 0.2 % (ref 0–3)
MODALITY: ABNORMAL
MONOCYTES # BLD AUTO: 0.3 10*3/MM3 (ref 0.1–0.9)
MONOCYTES NFR BLD AUTO: 5.1 % (ref 5–12)
NEUTROPHILS NFR BLD AUTO: 5.03 10*3/MM3 (ref 1.7–7)
NEUTROPHILS NFR BLD AUTO: 85.8 % (ref 42.7–76)
NOTE: ABNORMAL
NRBC BLD AUTO-RTO: 0 /100 WBC (ref 0–0.2)
OXYHGB MFR BLDV: 93.3 % (ref 94–99)
PCO2 BLDA: 32.7 MM HG (ref 35–45)
PCO2 TEMP ADJ BLD: ABNORMAL MM[HG]
PH BLDA: 7.45 PH UNITS (ref 7.35–7.45)
PH, TEMP CORRECTED: ABNORMAL
PLATELET # BLD AUTO: 347 10*3/MM3 (ref 140–450)
PMV BLD AUTO: 9.7 FL (ref 6–12)
PO2 BLDA: 64.2 MM HG (ref 83–108)
PO2 TEMP ADJ BLD: ABNORMAL MM[HG]
POTASSIUM SERPL-SCNC: 3.3 MMOL/L (ref 3.5–5.2)
RBC # BLD AUTO: 3.66 10*6/MM3 (ref 3.77–5.28)
SAO2 % BLDCOA: 94.5 % (ref 94–99)
SODIUM SERPL-SCNC: 133 MMOL/L (ref 136–145)
VENTILATOR MODE: ABNORMAL
WBC # BLD AUTO: 5.86 10*3/MM3 (ref 3.4–10.8)

## 2021-10-12 PROCEDURE — 36600 WITHDRAWAL OF ARTERIAL BLOOD: CPT

## 2021-10-12 PROCEDURE — 82805 BLOOD GASES W/O2 SATURATION: CPT

## 2021-10-12 PROCEDURE — 25010000002 DEXAMETHASONE PER 1 MG: Performed by: HOSPITALIST

## 2021-10-12 PROCEDURE — 86140 C-REACTIVE PROTEIN: CPT | Performed by: PHYSICIAN ASSISTANT

## 2021-10-12 PROCEDURE — 83050 HGB METHEMOGLOBIN QUAN: CPT

## 2021-10-12 PROCEDURE — 94799 UNLISTED PULMONARY SVC/PX: CPT

## 2021-10-12 PROCEDURE — 82375 ASSAY CARBOXYHB QUANT: CPT

## 2021-10-12 PROCEDURE — 25010000002 MICAFUNGIN SODIUM 100 MG RECONSTITUTED SOLUTION 1 EACH VIAL: Performed by: PHYSICIAN ASSISTANT

## 2021-10-12 PROCEDURE — 25010000002 HYDRALAZINE PER 20 MG: Performed by: PHYSICIAN ASSISTANT

## 2021-10-12 PROCEDURE — 99232 SBSQ HOSP IP/OBS MODERATE 35: CPT | Performed by: HOSPITALIST

## 2021-10-12 PROCEDURE — 63710000001 INSULIN ASPART PER 5 UNITS: Performed by: HOSPITALIST

## 2021-10-12 PROCEDURE — 25010000002 ENOXAPARIN PER 10 MG: Performed by: INTERNAL MEDICINE

## 2021-10-12 PROCEDURE — 63710000001 INSULIN DETEMIR PER 5 UNITS: Performed by: HOSPITALIST

## 2021-10-12 PROCEDURE — 25010000002 LEVOFLOXACIN PER 250 MG: Performed by: INTERNAL MEDICINE

## 2021-10-12 PROCEDURE — 80048 BASIC METABOLIC PNL TOTAL CA: CPT | Performed by: HOSPITALIST

## 2021-10-12 PROCEDURE — 82962 GLUCOSE BLOOD TEST: CPT

## 2021-10-12 PROCEDURE — 85025 COMPLETE CBC W/AUTO DIFF WBC: CPT | Performed by: HOSPITALIST

## 2021-10-12 RX ORDER — HYDRALAZINE HYDROCHLORIDE 20 MG/ML
10 INJECTION INTRAMUSCULAR; INTRAVENOUS EVERY 6 HOURS PRN
Status: DISCONTINUED | OUTPATIENT
Start: 2021-10-12 | End: 2021-10-15 | Stop reason: HOSPADM

## 2021-10-12 RX ORDER — AMLODIPINE BESYLATE 5 MG/1
5 TABLET ORAL
Status: DISCONTINUED | OUTPATIENT
Start: 2021-10-12 | End: 2021-10-15 | Stop reason: HOSPADM

## 2021-10-12 RX ORDER — POTASSIUM CHLORIDE 750 MG/1
40 CAPSULE, EXTENDED RELEASE ORAL AS NEEDED
Status: DISCONTINUED | OUTPATIENT
Start: 2021-10-12 | End: 2021-10-15 | Stop reason: HOSPADM

## 2021-10-12 RX ORDER — POTASSIUM CHLORIDE 1.5 G/1.77G
40 POWDER, FOR SOLUTION ORAL AS NEEDED
Status: DISCONTINUED | OUTPATIENT
Start: 2021-10-12 | End: 2021-10-15 | Stop reason: HOSPADM

## 2021-10-12 RX ORDER — POTASSIUM CHLORIDE 20 MEQ/1
40 TABLET, EXTENDED RELEASE ORAL EVERY 4 HOURS
Status: COMPLETED | OUTPATIENT
Start: 2021-10-12 | End: 2021-10-12

## 2021-10-12 RX ADMIN — ALBUTEROL SULFATE 2 PUFF: 90 AEROSOL, METERED RESPIRATORY (INHALATION) at 01:55

## 2021-10-12 RX ADMIN — LEVOFLOXACIN 500 MG: 5 INJECTION, SOLUTION INTRAVENOUS at 01:57

## 2021-10-12 RX ADMIN — ALBUTEROL SULFATE 2 PUFF: 90 AEROSOL, METERED RESPIRATORY (INHALATION) at 06:55

## 2021-10-12 RX ADMIN — METRONIDAZOLE 500 MG: 500 INJECTION, SOLUTION INTRAVENOUS at 10:41

## 2021-10-12 RX ADMIN — DEXAMETHASONE SODIUM PHOSPHATE 6 MG: 4 INJECTION, SOLUTION INTRA-ARTICULAR; INTRALESIONAL; INTRAMUSCULAR; INTRAVENOUS; SOFT TISSUE at 08:31

## 2021-10-12 RX ADMIN — AMLODIPINE BESYLATE 5 MG: 5 TABLET ORAL at 17:24

## 2021-10-12 RX ADMIN — INSULIN ASPART 5 UNITS: 100 INJECTION, SOLUTION INTRAVENOUS; SUBCUTANEOUS at 10:41

## 2021-10-12 RX ADMIN — HYDROXYZINE HYDROCHLORIDE 25 MG: 25 TABLET ORAL at 20:24

## 2021-10-12 RX ADMIN — ALBUTEROL SULFATE 2 PUFF: 90 AEROSOL, METERED RESPIRATORY (INHALATION) at 12:52

## 2021-10-12 RX ADMIN — INSULIN DETEMIR 30 UNITS: 100 INJECTION, SOLUTION SUBCUTANEOUS at 20:28

## 2021-10-12 RX ADMIN — INSULIN DETEMIR 25 UNITS: 100 INJECTION, SOLUTION SUBCUTANEOUS at 08:32

## 2021-10-12 RX ADMIN — FLUTICASONE PROPIONATE 1 SPRAY: 50 SPRAY, METERED NASAL at 08:30

## 2021-10-12 RX ADMIN — ENOXAPARIN SODIUM 30 MG: 30 INJECTION SUBCUTANEOUS at 08:31

## 2021-10-12 RX ADMIN — POTASSIUM CHLORIDE 40 MEQ: 20 TABLET, EXTENDED RELEASE ORAL at 20:26

## 2021-10-12 RX ADMIN — HYDRALAZINE HYDROCHLORIDE 10 MG: 20 INJECTION INTRAMUSCULAR; INTRAVENOUS at 16:34

## 2021-10-12 RX ADMIN — MICAFUNGIN SODIUM 100 MG: 100 INJECTION, POWDER, LYOPHILIZED, FOR SOLUTION INTRAVENOUS at 16:26

## 2021-10-12 RX ADMIN — METRONIDAZOLE 500 MG: 500 INJECTION, SOLUTION INTRAVENOUS at 20:23

## 2021-10-12 RX ADMIN — POTASSIUM CHLORIDE 40 MEQ: 20 TABLET, EXTENDED RELEASE ORAL at 17:24

## 2021-10-12 RX ADMIN — ALBUTEROL SULFATE 2 PUFF: 90 AEROSOL, METERED RESPIRATORY (INHALATION) at 17:24

## 2021-10-12 RX ADMIN — FLUTICASONE PROPIONATE 1 SPRAY: 50 SPRAY, METERED NASAL at 20:35

## 2021-10-12 RX ADMIN — METRONIDAZOLE 500 MG: 500 INJECTION, SOLUTION INTRAVENOUS at 03:01

## 2021-10-12 RX ADMIN — SODIUM CHLORIDE, PRESERVATIVE FREE 3 ML: 5 INJECTION INTRAVENOUS at 08:31

## 2021-10-12 RX ADMIN — ASPIRIN 81 MG: 81 TABLET, COATED ORAL at 08:31

## 2021-10-12 RX ADMIN — MONTELUKAST SODIUM 10 MG: 10 TABLET, COATED ORAL at 20:25

## 2021-10-12 RX ADMIN — SODIUM BICARBONATE 75 ML/HR: 84 INJECTION, SOLUTION INTRAVENOUS at 05:07

## 2021-10-12 RX ADMIN — INSULIN ASPART 8 UNITS: 100 INJECTION, SOLUTION INTRAVENOUS; SUBCUTANEOUS at 08:31

## 2021-10-12 RX ADMIN — SODIUM CHLORIDE, PRESERVATIVE FREE 3 ML: 5 INJECTION INTRAVENOUS at 20:23

## 2021-10-12 NOTE — PLAN OF CARE
Goal Outcome Evaluation:  Plan of Care Reviewed With: patient        Progress: no change  Outcome Summary: Pt has rested in bed throuhgout shift. Pt verbalized no complaints. No acute changes noted.

## 2021-10-12 NOTE — PROGRESS NOTES
PROGRESS NOTE         Patient Identification:  Name:  Jessica Bravo  Age:  56 y.o.  Sex:  female  :  1965  MRN:  [id number]  Visit Number:  [id number]  Primary Care Provider:  Buck Wallis APRN         LOS: 2 days       ----------------------------------------------------------------------------------------------------------------------  Subjective       Chief Complaints:    Hyperglycemia, Vomiting, and Nausea        Interval History:      Discussed case with primary RN.  Patient's oxygen requirements are stable at 6 L humidified nasal cannula.  No new issues overnight.  Afebrile, no diarrhea.  CRP is improved at 6.01.  WBC remains normal.     ----------------------------------------------------------------------------------------------------------------------      Objective       Current Hospital Meds:  albuterol sulfate HFA, 2 puff, Inhalation, 4x Daily - RT  aspirin, 81 mg, Oral, Daily  cholecalciferol, 50,000 Units, Oral, Weekly  dexamethasone, 6 mg, Intravenous, Daily  enoxaparin, 30 mg, Subcutaneous, Daily  fluticasone, 1 spray, Nasal, BID  hydrOXYzine, 25 mg, Oral, Nightly  insulin aspart, 0-14 Units, Subcutaneous, TID AC  insulin detemir, 25 Units, Subcutaneous, Q12H  levoFLOXacin, 500 mg, Intravenous, Q48H  metroNIDAZOLE, 500 mg, Intravenous, Q8H  micafungin (MYCAMINE) IV, 100 mg, Intravenous, Q24H  montelukast, 10 mg, Oral, Nightly  pravastatin, 10 mg, Oral, Daily  sodium chloride, 3 mL, Intravenous, Q12H      IV Fluids 1000 mL + additives, 75 mL/hr, Last Rate: 75 mL/hr (10/12/21 0507)      ----------------------------------------------------------------------------------------------------------------------    Vital Signs:  Temp:  [98.1 °F (36.7 °C)-98.2 °F (36.8 °C)] 98.1 °F (36.7 °C)  Heart Rate:  [65-85] 73  Resp:  [18-20] 20  BP: (151-168)/(76-88) 165/84  No data found.  SpO2 Percentage    10/12/21 0245 10/12/21 0655 10/12/21 0657   SpO2: 94% (!) 78%  Comment: increased to 90%.  Pt. had been to the restroom 90%  Comment: 2350     SpO2:  [78 %-96 %] 90 %  on  Flow (L/min):  [6] 6;   Device (Oxygen Therapy): humidified; nasal cannula    Body mass index is 27.46 kg/m².  Wt Readings from Last 3 Encounters:   10/10/21 72.6 kg (160 lb)   02/05/20 78 kg (172 lb)   10/18/18 79.2 kg (174 lb 9.6 oz)        Intake/Output Summary (Last 24 hours) at 10/12/2021 1225  Last data filed at 10/12/2021 0245  Gross per 24 hour   Intake 3708 ml   Output 1650 ml   Net 2058 ml     Diet Regular; Cardiac, Consistent Carbohydrate, Renal  ----------------------------------------------------------------------------------------------------------------------      Physical Exam:    Deferred due to COVID-19 isolation.  ----------------------------------------------------------------------------------------------------------------------  Results from last 7 days   Lab Units 10/10/21  0448 10/09/21  1522   TROPONIN T ng/mL <0.010 0.016     Results from last 7 days   Lab Units 10/09/21  1522   PROBNP pg/mL 581.1       Results from last 7 days   Lab Units 10/12/21  0353   PH, ARTERIAL pH units 7.449   PO2 ART mm Hg 64.2*   PCO2, ARTERIAL mm Hg 32.7*   HCO3 ART mmol/L 22.7     Results from last 7 days   Lab Units 10/12/21  0442 10/11/21  0648 10/10/21  0448 10/09/21  2004 10/09/21  1522 10/09/21  1522   CRP mg/dL 6.01* 9.30*  --   --   --  29.13*   LACTATE mmol/L  --   --  1.7 2.7*  --  2.8*   WBC 10*3/mm3 5.86 9.10 7.55  --    < > 10.15   HEMOGLOBIN g/dL 9.5* 10.3* 10.5*  --    < > 11.8*   HEMATOCRIT % 30.9* 32.8* 33.7*  --    < > 37.7   MCV fL 84.4 84.1 84.9  --    < > 84.9   MCHC g/dL 30.7* 31.4* 31.2*  --    < > 31.3*   PLATELETS 10*3/mm3 347 359 326  --    < > 365    < > = values in this interval not displayed.     Results from last 7 days   Lab Units 10/12/21  0442 10/11/21  0648 10/10/21  0448 10/09/21  2218 10/09/21  1522   SODIUM mmol/L 133* 129* 127*   < > 128*   POTASSIUM mmol/L 3.3* 3.9 4.0   < > 3.8   MAGNESIUM  mg/dL  --  1.8 2.1  --  2.1   CHLORIDE mmol/L 101 99 92*   < > 87*   CO2 mmol/L 20.7* 16.3* 16.6*   < > 19.8*   BUN mg/dL 47* 58* 62*   < > 61*   CREATININE mg/dL 2.30* 2.94* 3.18*   < > 3.90*   EGFR IF NONAFRICN AM mL/min/1.73 22* 16* 15*   < > 12*   CALCIUM mg/dL 8.5* 8.5* 8.6   < > 8.6   GLUCOSE mg/dL 264* 530* 425*   < > 534*   ALBUMIN g/dL  --  2.56* 2.77*  --  3.19*   BILIRUBIN mg/dL  --  0.3 0.4  --  0.5   ALK PHOS U/L  --  69 74  --  92   AST (SGOT) U/L  --  11 14  --  16   ALT (SGPT) U/L  --  9 11  --  13    < > = values in this interval not displayed.   Estimated Creatinine Clearance: 26.1 mL/min (A) (by C-G formula based on SCr of 2.3 mg/dL (H)).  No results found for: AMMONIA    Hemoglobin A1C   Date/Time Value Ref Range Status   10/09/2021 1522 10.50 (H) 4.80 - 5.60 % Final     Glucose   Date/Time Value Ref Range Status   10/12/2021 1041 239 (H) 70 - 130 mg/dL Final     Comment:     Meter: [id number] : [id number] Martin William   10/12/2021 0830 277 (H) 70 - 130 mg/dL Final     Comment:     Meter: [id number] : [id number] Martin William   10/11/2021 2036 339 (H) 70 - 130 mg/dL Final     Comment:     Meter: [id number] : [id number] EMERSON MCKINNEY   10/11/2021 1605 339 (H) 70 - 130 mg/dL Final     Comment:     Meter: [id number] : [id number] Martin William   10/11/2021 1032 446 (C) 70 - 130 mg/dL Final     Comment:     RN Notified Meter: [id number] : [id number] Roberta Rivera   10/11/2021 0728 435 (C) 70 - 130 mg/dL Final     Comment:     Confirmed by Repeat RN Notified Meter: [id number] : [id number] MICHAEL HINDS   10/10/2021 2019 384 (H) 70 - 130 mg/dL Final     Comment:     Meter: [id number] : [id number] marlys crystal   10/10/2021 1823 377 (H) 70 - 130 mg/dL Final     Comment:     Meter: [id number] : [id number] Roberta Rivera     Lab Results   Component Value Date    HGBA1C 10.50 (H) 10/09/2021     Lab Results   Component Value Date    TSH 0.397 10/09/2021       Blood Culture    Date Value Ref Range Status   10/09/2021 No growth at 24 hours  Preliminary   10/09/2021 No growth at 24 hours  Preliminary     Urine Culture   Date Value Ref Range Status   10/09/2021 25,000 CFU/mL Mixed Bubba Isolated  Final     No results found for: WOUNDCX  No results found for: STOOLCX  No results found for: RESPCX  Pain Management Panel       Pain Management Panel Latest Ref Rng & Units 10/10/2021    CREATININE UR mg/dL 41.4              ----------------------------------------------------------------------------------------------------------------------  Imaging Results (Last 24 Hours)       ** No results found for the last 24 hours. **            ----------------------------------------------------------------------------------------------------------------------    Assessment/Plan       Assessment/Plan     ASSESSMENT:    1.  Severe sepsis with lactic acid greater than 2 on admission  2.  COVID-19 pneumonia   3.  Superimposed bacterial pneumonia  4.  UTI    PLAN:    Discussed case with primary RN.  Patient's oxygen requirements are stable at 6 L humidified nasal cannula.  No new issues overnight.  Afebrile, no diarrhea.  CRP is improved at 6.01.  WBC remains normal.     Urinalysis on 10/9/2021 was positive with 31-50 WBCs, 1+ bacteria, and 3+ budding yeast. Urine culture finalized as 25,000 colonies of mixed bubba. Nuclear medicine lung scan on 10/9/2021 showed low probability of PE on the basis of perfusion only nuclear medicine study.  CT chest without contrast on 10/9/2021 showed extensive patchy groundglass infiltrates scattered throughout both lungs, consistent with multifocal pneumonia.  COVID-19 reporting criteria: Typical.  Commonly reported imaging features of COVID-19 pneumonia are present.  Other processes such as influenza pneumonia and organizing pneumonia can cause a similar imaging pattern.  Hepatic steatosis.  Multiple prominent mediastinal lymph nodes are likely reactive.  Follow-up  recommended.  Chest x-ray on 10/9/2021 showed extensive bilateral patchy airspace disease.  Commonly reported imaging features of COVID-19 pneumonia are present.  Other processes such as influenza pneumonia and organizing pneumonia can cause a similar imaging pattern.  COVID-19 and flu A/B PCR on 10/9/2021 was positive.  Mycoplasma pneumonia antibody on 10/10/2021 was negative.  Strep pneumo antigen on 10/10/2021 is in process.  Blood cultures on 10/9/2021 are in process.     Would recommend Decadron, as patient is requiring supplemental oxygen.  Recommend to consider remdesivir even in the setting of end-stage renal disease as benefit may outweigh risk.     For now, we are agreeable with Levaquin and Flagyl for pneumonia coverage.  As urinanalysis showed 3+ budding yeast, recommend to continue on micafungin 100 mg IV every 24 hours.  We will follow closely and adjust antibiotic therapy as appropriate.    Code Status:   Code Status and Medical Interventions:   Ordered at: 10/10/21 0104     Level Of Support Discussed With:    Patient     Code Status:    CPR     Medical Interventions (Level of Support Prior to Arrest):    Full       JOHN Holm  10/12/21  12:25 EDT

## 2021-10-12 NOTE — PLAN OF CARE
Goal Outcome Evaluation:           Progress: no change  Outcome Summary: Patient has done well today.  Is sitting up in chair, remains on o2 at 6l/nc.  Will continue to monitor.

## 2021-10-12 NOTE — PROGRESS NOTES
Nephrology Progress Note      Subjective     Pt remains on 6L of O2 with no further increment in O2 requirement.     Objective       Vital signs :     Temp:  [98.1 °F (36.7 °C)-98.2 °F (36.8 °C)] 98.1 °F (36.7 °C)  Heart Rate:  [70-85] 73  Resp:  [18-20] 20  BP: (151-168)/(76-88) 165/84      Intake/Output Summary (Last 24 hours) at 10/12/2021 0744  Last data filed at 10/12/2021 0245  Gross per 24 hour   Intake 3808 ml   Output 1650 ml   Net 2158 ml       Physical Exam:    General: not in acute distress  Heart: Tele reveals sinus rhythm.   Lungs: Respirations appear to be regular, even and unlabored with no signs of respiratory distress. No audible wheezing.    Abdomen: no distension  Extremities: none  Skin: No visible bleeding, bruising, or rash.  Neurologic: no apparent focal deficit.     Laboratory Data :     Albumin Albumin   Date Value Ref Range Status   10/11/2021 2.56 (L) 3.50 - 5.20 g/dL Final   10/10/2021 2.77 (L) 3.50 - 5.20 g/dL Final   10/09/2021 3.19 (L) 3.50 - 5.20 g/dL Final      Magnesium Magnesium   Date Value Ref Range Status   10/11/2021 1.8 1.6 - 2.6 mg/dL Final   10/10/2021 2.1 1.6 - 2.6 mg/dL Final   10/09/2021 2.1 1.6 - 2.6 mg/dL Final          PTH               No results found for: PTH    CBC and coagulation:  Results from last 7 days   Lab Units 10/12/21  0442 10/11/21  0648 10/10/21  1245 10/10/21  0448 10/09/21  2004 10/09/21  1522 10/09/21  1522   PROCALCITONIN ng/mL  --   --  0.61*  --   --   --   --    LACTATE mmol/L  --   --   --  1.7 2.7*  --  2.8*   CRP mg/dL 6.01* 9.30*  --   --   --   --  29.13*   WBC 10*3/mm3 5.86 9.10  --  7.55  --    < > 10.15   HEMOGLOBIN g/dL 9.5* 10.3*  --  10.5*  --    < > 11.8*   HEMATOCRIT % 30.9* 32.8*  --  33.7*  --    < > 37.7   MCV fL 84.4 84.1  --  84.9  --    < > 84.9   MCHC g/dL 30.7* 31.4*  --  31.2*  --    < > 31.3*   PLATELETS 10*3/mm3 347 359  --  326  --    < > 365   D DIMER QUANT MCGFEU/mL  --   --   --   --  2.82*  --  3.09*    < > = values  in this interval not displayed.     Acid/base balance:  Results from last 7 days   Lab Units 10/12/21  0353 10/09/21  2227 10/09/21  1529   PH, ARTERIAL pH units 7.449 7.368 7.386   PO2 ART mm Hg 64.2* 57.6* 43.1*   PCO2, ARTERIAL mm Hg 32.7* 37.1 34.4*   HCO3 ART mmol/L 22.7 21.3 20.6     Renal and electrolytes:  Results from last 7 days   Lab Units 10/12/21  0442 10/11/21  0648 10/10/21  0448 10/09/21  2218 10/09/21  2218 10/09/21  1522 10/09/21  1522   SODIUM mmol/L 133* 129* 127*  --  131*  --  128*   POTASSIUM mmol/L 3.3* 3.9 4.0   < > 4.1   < > 3.8   MAGNESIUM mg/dL  --  1.8 2.1  --   --   --  2.1   CHLORIDE mmol/L 101 99 92*   < > 95*   < > 87*   CO2 mmol/L 20.7* 16.3* 16.6*   < > 20.3*   < > 19.8*   BUN mg/dL 47* 58* 62*   < > 61*   < > 61*   CREATININE mg/dL 2.30* 2.94* 3.18*  --  3.59*  --  3.90*   EGFR IF NONAFRICN AM mL/min/1.73 22* 16* 15*   < > 13*   < > 12*   CALCIUM mg/dL 8.5* 8.5* 8.6   < > 8.3*   < > 8.6   PHOSPHORUS mg/dL  --   --  4.1  --  3.4  --   --     < > = values in this interval not displayed.     Estimated Creatinine Clearance: 26.1 mL/min (A) (by C-G formula based on SCr of 2.3 mg/dL (H)).    Liver and pancreatic function:  Results from last 7 days   Lab Units 10/11/21  0648 10/10/21  0448 10/09/21  1522   ALBUMIN g/dL 2.56* 2.77* 3.19*   BILIRUBIN mg/dL 0.3 0.4 0.5   ALK PHOS U/L 69 74 92   AST (SGOT) U/L 11 14 16   ALT (SGPT) U/L 9 11 13         Cardiac:  Results from last 7 days   Lab Units 10/09/21  1522   PROBNP pg/mL 581.1     Liver and pancreatic function:  Results from last 7 days   Lab Units 10/11/21  0648 10/10/21  0448 10/09/21  1522   ALBUMIN g/dL 2.56* 2.77* 3.19*   BILIRUBIN mg/dL 0.3 0.4 0.5   ALK PHOS U/L 69 74 92   AST (SGOT) U/L 11 14 16   ALT (SGPT) U/L 9 11 13       Medications :     albuterol sulfate HFA, 2 puff, Inhalation, 4x Daily - RT  aspirin, 81 mg, Oral, Daily  cholecalciferol, 50,000 Units, Oral, Weekly  dexamethasone, 6 mg, Intravenous, Daily  enoxaparin, 30  mg, Subcutaneous, Daily  fluticasone, 1 spray, Nasal, BID  hydrOXYzine, 25 mg, Oral, Nightly  insulin aspart, 0-14 Units, Subcutaneous, TID AC  insulin detemir, 25 Units, Subcutaneous, Q12H  levoFLOXacin, 500 mg, Intravenous, Q48H  metroNIDAZOLE, 500 mg, Intravenous, Q8H  micafungin (MYCAMINE) IV, 100 mg, Intravenous, Q24H  montelukast, 10 mg, Oral, Nightly  pravastatin, 10 mg, Oral, Daily  sodium chloride, 3 mL, Intravenous, Q12H      IV Fluids 1000 mL + additives, 75 mL/hr, Last Rate: 75 mL/hr (10/12/21 4795)          Assessment/Plan     1-Acute kidney injury on chronic kidney disease stage IV:   2-Hyponatremia, improving.  3-hyperglycemia  4.Covid pneumonia  5-metabolic acidosis  Cr continue to get better, 2.3 today from 2.9. as there is significant positive fluid balance, will reduce IVF to 40cc/h. 4G proteinuria      ANA PAULA on CKD stage IV likely pre renal azotemia   Follows Dr. Araiza and was on dialysis for 3 times   4G proteinuria, no significant hematuria.       Eusebio Duarte MD  10/12/21  07:44 EDT

## 2021-10-12 NOTE — PROGRESS NOTES
[facility] HOSPITALIST PROGRESS NOTE     Patient Identification:  Name:  Jessica Bravo  Age:  56 y.o.  Sex:  female  :  1965  MRN:  [id number]  Visit Number:  [id number]  Primary Care Provider:  Buck Wallis APRN    Length of stay:  2    Subjective: Patient seen and examined inside her room, she is out of bed to chair she is brushing her teeth, patient reports she feels some better, renal function improved, blood pressure noted to be elevated.  Accu-Chek results still hyperglycemic but improving.  A1c is more than 10.  Except for the coughing which take a deep breath she feels much better.  She is 96% on 6 L humidified oxygen supplementation.    Chief Complaint: Short of breath  ----------------------------------------------------------------------------------------------------------------------  Current Hospital Meds:  albuterol sulfate HFA, 2 puff, Inhalation, 4x Daily - RT  amLODIPine, 5 mg, Oral, Q24H  aspirin, 81 mg, Oral, Daily  cholecalciferol, 50,000 Units, Oral, Weekly  dexamethasone, 6 mg, Intravenous, Daily  enoxaparin, 30 mg, Subcutaneous, Daily  fluticasone, 1 spray, Nasal, BID  hydrOXYzine, 25 mg, Oral, Nightly  insulin aspart, 0-14 Units, Subcutaneous, TID AC  insulin detemir, 25 Units, Subcutaneous, Q12H  levoFLOXacin, 500 mg, Intravenous, Q48H  metroNIDAZOLE, 500 mg, Intravenous, Q8H  micafungin (MYCAMINE) IV, 100 mg, Intravenous, Q24H  montelukast, 10 mg, Oral, Nightly  pravastatin, 10 mg, Oral, Daily  sodium chloride, 3 mL, Intravenous, Q12H      IV Fluids 1000 mL + additives, 75 mL/hr, Last Rate: 75 mL/hr (10/12/21 9398)      ----------------------------------------------------------------------------------------------------------------------  Vital Signs:  Temp:  [98.1 °F (36.7 °C)-98.2 °F (36.8 °C)] 98.1 °F (36.7 °C)  Heart Rate:  [65-85] 69  Resp:  [18-20] 18  BP: (158-175)/(76-88) 175/85       Tele: Sinus rhythm 71 bpm      10/09/21  1515 10/10/21  0205   Weight:  74.4 kg (164 lb) 72.6 kg (160 lb)     Body mass index is 27.46 kg/m².    Intake/Output Summary (Last 24 hours) at 10/12/2021 1622  Last data filed at 10/12/2021 1438  Gross per 24 hour   Intake 3888 ml   Output 1650 ml   Net 2238 ml     Diet Regular; Cardiac, Consistent Carbohydrate, Renal  ----------------------------------------------------------------------------------------------------------------------  Physical exam:  General: Chronically ill-appearing, she is out of bed to chair, she is brushing her teeth, she does appear to get dyspneic while brushing her teeth. No respiratory distress.    Skin:  Skin is warm and dry. No rash noted. No pallor.    HENT:  Head:  Normocephalic and atraumatic.  Mouth:  Moist mucous membranes.    Eyes:  Conjunctivae and EOM are normal.  Pupils are equal, round, and reactive to light.  No scleral icterus.    Neck:  Neck supple.  No JVD present.    Pulmonary/Chest: Occasional rhonchi on deep inspiration right side, no respiratory distress, no wheezes, good air movement.  Cardiovascular:  Normal rate, regular rhythm and normal heart sounds with no murmur.  Abdominal:  Soft.  Bowel sounds are normal.  No distension and no tenderness.   Extremities:  No edema, no tenderness, and no deformity.  No red or swollen joints anywhere.  Strong pulses in all 4 extremities with no clubbing, no cyanosis, no edema.  Neurological:  Motor strength equal no obvious deficit, sensory grossly intact.   No cranial nerve deficit.  No tongue deviation.  No facial droop.  No slurred speech.    Genitourinary: No Medina catheter  Back:  ----------------------------------------------------------------------------------------------------------------------  ----------------------------------------------------------------------------------------------------------------------  Results from last 7 days   Lab Units 10/10/21  0448 10/09/21  1522   TROPONIN T ng/mL <0.010 0.016     Results from last 7 days   Lab Units  10/12/21  0442 10/11/21  0648 10/10/21  0448 10/09/21  2004 10/09/21  1522 10/09/21  1522   CRP mg/dL 6.01* 9.30*  --   --   --  29.13*   LACTATE mmol/L  --   --  1.7 2.7*  --  2.8*   WBC 10*3/mm3 5.86 9.10 7.55  --    < > 10.15   HEMOGLOBIN g/dL 9.5* 10.3* 10.5*  --    < > 11.8*   HEMATOCRIT % 30.9* 32.8* 33.7*  --    < > 37.7   MCV fL 84.4 84.1 84.9  --    < > 84.9   MCHC g/dL 30.7* 31.4* 31.2*  --    < > 31.3*   PLATELETS 10*3/mm3 347 359 326  --    < > 365    < > = values in this interval not displayed.     Results from last 7 days   Lab Units 10/12/21  0353   PH, ARTERIAL pH units 7.449   PO2 ART mm Hg 64.2*   PCO2, ARTERIAL mm Hg 32.7*   HCO3 ART mmol/L 22.7     Results from last 7 days   Lab Units 10/12/21  0442 10/11/21  0648 10/10/21  0448 10/09/21  2218 10/09/21  1522   SODIUM mmol/L 133* 129* 127*   < > 128*   POTASSIUM mmol/L 3.3* 3.9 4.0   < > 3.8   MAGNESIUM mg/dL  --  1.8 2.1  --  2.1   CHLORIDE mmol/L 101 99 92*   < > 87*   CO2 mmol/L 20.7* 16.3* 16.6*   < > 19.8*   BUN mg/dL 47* 58* 62*   < > 61*   CREATININE mg/dL 2.30* 2.94* 3.18*   < > 3.90*   EGFR IF NONAFRICN AM mL/min/1.73 22* 16* 15*   < > 12*   CALCIUM mg/dL 8.5* 8.5* 8.6   < > 8.6   GLUCOSE mg/dL 264* 530* 425*   < > 534*   ALBUMIN g/dL  --  2.56* 2.77*  --  3.19*   BILIRUBIN mg/dL  --  0.3 0.4  --  0.5   ALK PHOS U/L  --  69 74  --  92   AST (SGOT) U/L  --  11 14  --  16   ALT (SGPT) U/L  --  9 11  --  13    < > = values in this interval not displayed.   Estimated Creatinine Clearance: 26.1 mL/min (A) (by C-G formula based on SCr of 2.3 mg/dL (H)).    No results found for: AMMONIA      Blood Culture   Date Value Ref Range Status   10/09/2021 No growth at 3 days  Preliminary   10/09/2021 No growth at 3 days  Preliminary     Urine Culture   Date Value Ref Range Status   10/09/2021 25,000 CFU/mL Mixed Nikki Isolated  Final     No results found for: WOUNDCX  No results found for: STOOLCX    I have personally looked at the labs and they are  summarized above.  ----------------------------------------------------------------------------------------------------------------------  Imaging Results (Last 24 Hours)     ** No results found for the last 24 hours. **        ----------------------------------------------------------------------------------------------------------------------  Assessment and Plan:  -Sepsis present on admission secondary to COVID-19 pneumonia  -COVID-19 pneumonia with concerns for possible superimposed bacterial coinfection  -UTI with budding yeast on culture  -Acute kidney injury on CKD stage IV improved  -CKD stage IV with 4 g proteinuria  -Diabetes type 2 with hyperglycemia  -Acute hypokalemia  -Essential hypertension  -D-dimer elevation VQ scan low probability for PE, will order venous ultrasound the legs    Acute kidney injury improving, infectious disease recommended antibiotic coverage for concerns for super imposed bacterial infection, micafungin also added due to budding yeast UTI.  For hypertension start the patient on Norvasc.  Increase Levemir.  Replace potassium, taper oxygen as tolerated.    Disposition Home in stable      Brianna Craven MD  10/12/21  16:22 EDT

## 2021-10-13 ENCOUNTER — APPOINTMENT (OUTPATIENT)
Dept: ULTRASOUND IMAGING | Facility: HOSPITAL | Age: 56
End: 2021-10-13

## 2021-10-13 LAB
ANION GAP SERPL CALCULATED.3IONS-SCNC: 8.5 MMOL/L (ref 5–15)
BASOPHILS # BLD AUTO: 0.01 10*3/MM3 (ref 0–0.2)
BASOPHILS NFR BLD AUTO: 0.2 % (ref 0–1.5)
BUN SERPL-MCNC: 41 MG/DL (ref 6–20)
BUN/CREAT SERPL: 17.8 (ref 7–25)
CALCIUM SPEC-SCNC: 8.5 MG/DL (ref 8.6–10.5)
CHLORIDE SERPL-SCNC: 107 MMOL/L (ref 98–107)
CO2 SERPL-SCNC: 21.5 MMOL/L (ref 22–29)
CREAT SERPL-MCNC: 2.3 MG/DL (ref 0.57–1)
CRP SERPL-MCNC: 2.94 MG/DL (ref 0–0.5)
DEPRECATED RDW RBC AUTO: 43.8 FL (ref 37–54)
EOSINOPHIL # BLD AUTO: 0.01 10*3/MM3 (ref 0–0.4)
EOSINOPHIL NFR BLD AUTO: 0.2 % (ref 0.3–6.2)
ERYTHROCYTE [DISTWIDTH] IN BLOOD BY AUTOMATED COUNT: 14.1 % (ref 12.3–15.4)
GFR SERPL CREATININE-BSD FRML MDRD: 22 ML/MIN/1.73
GLUCOSE BLDC GLUCOMTR-MCNC: 135 MG/DL (ref 70–130)
GLUCOSE BLDC GLUCOMTR-MCNC: 201 MG/DL (ref 70–130)
GLUCOSE BLDC GLUCOMTR-MCNC: 239 MG/DL (ref 70–130)
GLUCOSE BLDC GLUCOMTR-MCNC: 252 MG/DL (ref 70–130)
GLUCOSE SERPL-MCNC: 183 MG/DL (ref 65–99)
HCT VFR BLD AUTO: 33.2 % (ref 34–46.6)
HGB BLD-MCNC: 10.3 G/DL (ref 12–15.9)
IMM GRANULOCYTES # BLD AUTO: 0.16 10*3/MM3 (ref 0–0.05)
IMM GRANULOCYTES NFR BLD AUTO: 2.6 % (ref 0–0.5)
LYMPHOCYTES # BLD AUTO: 0.82 10*3/MM3 (ref 0.7–3.1)
LYMPHOCYTES NFR BLD AUTO: 13.1 % (ref 19.6–45.3)
MCH RBC QN AUTO: 26.6 PG (ref 26.6–33)
MCHC RBC AUTO-ENTMCNC: 31 G/DL (ref 31.5–35.7)
MCV RBC AUTO: 85.8 FL (ref 79–97)
MONOCYTES # BLD AUTO: 0.39 10*3/MM3 (ref 0.1–0.9)
MONOCYTES NFR BLD AUTO: 6.2 % (ref 5–12)
NEUTROPHILS NFR BLD AUTO: 4.87 10*3/MM3 (ref 1.7–7)
NEUTROPHILS NFR BLD AUTO: 77.7 % (ref 42.7–76)
NRBC BLD AUTO-RTO: 0 /100 WBC (ref 0–0.2)
PLATELET # BLD AUTO: 357 10*3/MM3 (ref 140–450)
PMV BLD AUTO: 9.7 FL (ref 6–12)
POTASSIUM SERPL-SCNC: 4.5 MMOL/L (ref 3.5–5.2)
RBC # BLD AUTO: 3.87 10*6/MM3 (ref 3.77–5.28)
SODIUM SERPL-SCNC: 137 MMOL/L (ref 136–145)
WBC # BLD AUTO: 6.26 10*3/MM3 (ref 3.4–10.8)

## 2021-10-13 PROCEDURE — 25010000002 DEXAMETHASONE PER 1 MG: Performed by: HOSPITALIST

## 2021-10-13 PROCEDURE — 94799 UNLISTED PULMONARY SVC/PX: CPT

## 2021-10-13 PROCEDURE — 93970 EXTREMITY STUDY: CPT

## 2021-10-13 PROCEDURE — 25010000002 ENOXAPARIN PER 10 MG: Performed by: INTERNAL MEDICINE

## 2021-10-13 PROCEDURE — 99232 SBSQ HOSP IP/OBS MODERATE 35: CPT | Performed by: HOSPITALIST

## 2021-10-13 PROCEDURE — 86140 C-REACTIVE PROTEIN: CPT | Performed by: PHYSICIAN ASSISTANT

## 2021-10-13 PROCEDURE — 87493 C DIFF AMPLIFIED PROBE: CPT | Performed by: PHYSICIAN ASSISTANT

## 2021-10-13 PROCEDURE — 97530 THERAPEUTIC ACTIVITIES: CPT

## 2021-10-13 PROCEDURE — 82962 GLUCOSE BLOOD TEST: CPT

## 2021-10-13 PROCEDURE — 63710000001 INSULIN DETEMIR PER 5 UNITS: Performed by: HOSPITALIST

## 2021-10-13 PROCEDURE — 97116 GAIT TRAINING THERAPY: CPT

## 2021-10-13 PROCEDURE — 80048 BASIC METABOLIC PNL TOTAL CA: CPT | Performed by: HOSPITALIST

## 2021-10-13 PROCEDURE — 85025 COMPLETE CBC W/AUTO DIFF WBC: CPT | Performed by: HOSPITALIST

## 2021-10-13 PROCEDURE — 25010000002 MICAFUNGIN SODIUM 100 MG RECONSTITUTED SOLUTION 1 EACH VIAL: Performed by: PHYSICIAN ASSISTANT

## 2021-10-13 PROCEDURE — 63710000001 INSULIN ASPART PER 5 UNITS: Performed by: HOSPITALIST

## 2021-10-13 PROCEDURE — 63710000001 PROCHLORPERAZINE MALEATE PER 5 MG: Performed by: HOSPITALIST

## 2021-10-13 RX ORDER — PROCHLORPERAZINE MALEATE 5 MG/1
5 TABLET ORAL ONCE
Status: COMPLETED | OUTPATIENT
Start: 2021-10-13 | End: 2021-10-13

## 2021-10-13 RX ORDER — PANTOPRAZOLE SODIUM 40 MG/1
40 TABLET, DELAYED RELEASE ORAL
Status: DISCONTINUED | OUTPATIENT
Start: 2021-10-14 | End: 2021-10-15 | Stop reason: HOSPADM

## 2021-10-13 RX ADMIN — MICAFUNGIN SODIUM 100 MG: 100 INJECTION, POWDER, LYOPHILIZED, FOR SOLUTION INTRAVENOUS at 17:04

## 2021-10-13 RX ADMIN — ENOXAPARIN SODIUM 30 MG: 30 INJECTION SUBCUTANEOUS at 08:10

## 2021-10-13 RX ADMIN — ALBUTEROL SULFATE 2 PUFF: 90 AEROSOL, METERED RESPIRATORY (INHALATION) at 08:10

## 2021-10-13 RX ADMIN — METRONIDAZOLE 500 MG: 500 INJECTION, SOLUTION INTRAVENOUS at 21:31

## 2021-10-13 RX ADMIN — SODIUM CHLORIDE, PRESERVATIVE FREE 3 ML: 5 INJECTION INTRAVENOUS at 21:31

## 2021-10-13 RX ADMIN — INSULIN ASPART 5 UNITS: 100 INJECTION, SOLUTION INTRAVENOUS; SUBCUTANEOUS at 16:42

## 2021-10-13 RX ADMIN — DEXAMETHASONE SODIUM PHOSPHATE 6 MG: 4 INJECTION, SOLUTION INTRA-ARTICULAR; INTRALESIONAL; INTRAMUSCULAR; INTRAVENOUS; SOFT TISSUE at 08:10

## 2021-10-13 RX ADMIN — METRONIDAZOLE 500 MG: 500 INJECTION, SOLUTION INTRAVENOUS at 04:35

## 2021-10-13 RX ADMIN — INSULIN DETEMIR 30 UNITS: 100 INJECTION, SOLUTION SUBCUTANEOUS at 21:48

## 2021-10-13 RX ADMIN — PROCHLORPERAZINE MALEATE 5 MG: 5 TABLET ORAL at 17:04

## 2021-10-13 RX ADMIN — ALBUTEROL SULFATE 2 PUFF: 90 AEROSOL, METERED RESPIRATORY (INHALATION) at 12:24

## 2021-10-13 RX ADMIN — SODIUM CHLORIDE, PRESERVATIVE FREE 3 ML: 5 INJECTION INTRAVENOUS at 08:10

## 2021-10-13 RX ADMIN — ALBUTEROL SULFATE 2 PUFF: 90 AEROSOL, METERED RESPIRATORY (INHALATION) at 20:12

## 2021-10-13 RX ADMIN — FLUTICASONE PROPIONATE 1 SPRAY: 50 SPRAY, METERED NASAL at 21:32

## 2021-10-13 RX ADMIN — ASPIRIN 81 MG: 81 TABLET, COATED ORAL at 08:10

## 2021-10-13 RX ADMIN — METRONIDAZOLE 500 MG: 500 INJECTION, SOLUTION INTRAVENOUS at 11:52

## 2021-10-13 RX ADMIN — ALBUTEROL SULFATE 2 PUFF: 90 AEROSOL, METERED RESPIRATORY (INHALATION) at 00:14

## 2021-10-13 RX ADMIN — HYDROXYZINE HYDROCHLORIDE 25 MG: 25 TABLET ORAL at 21:32

## 2021-10-13 RX ADMIN — FLUTICASONE PROPIONATE 1 SPRAY: 50 SPRAY, METERED NASAL at 08:11

## 2021-10-13 RX ADMIN — MONTELUKAST SODIUM 10 MG: 10 TABLET, COATED ORAL at 21:32

## 2021-10-13 RX ADMIN — INSULIN DETEMIR 30 UNITS: 100 INJECTION, SOLUTION SUBCUTANEOUS at 08:11

## 2021-10-13 RX ADMIN — INSULIN ASPART 5 UNITS: 100 INJECTION, SOLUTION INTRAVENOUS; SUBCUTANEOUS at 11:52

## 2021-10-13 RX ADMIN — AMLODIPINE BESYLATE 5 MG: 5 TABLET ORAL at 08:10

## 2021-10-13 NOTE — PROGRESS NOTES
Nephrology Progress Note      Subjective     No gross  Change in clinical status, continued on same oxygen requirement    Objective       Vital signs :     Temp:  [97.8 °F (36.6 °C)-98.5 °F (36.9 °C)] 97.8 °F (36.6 °C)  Heart Rate:  [66-81] 66  Resp:  [18-20] 18  BP: (138-175)/(63-85) 138/66      Intake/Output Summary (Last 24 hours) at 10/13/2021 0717  Last data filed at 10/13/2021 0551  Gross per 24 hour   Intake 3265.26 ml   Output 200 ml   Net 3065.26 ml       Physical Exam:    General: not in acute distress  Heart: Tele reveals sinus rhythm.   Lungs: Respirations appear to be regular, even and unlabored with no signs of respiratory distress. No audible wheezing.    Abdomen: no distension  Extremities: none  Skin: No visible bleeding, bruising, or rash.  Neurologic: no apparent focal deficit.     Laboratory Data :     Albumin Albumin   Date Value Ref Range Status   10/11/2021 2.56 (L) 3.50 - 5.20 g/dL Final      Magnesium Magnesium   Date Value Ref Range Status   10/11/2021 1.8 1.6 - 2.6 mg/dL Final          PTH               No results found for: PTH    CBC and coagulation:  Results from last 7 days   Lab Units 10/13/21  0522 10/12/21  0442 10/11/21  0648 10/10/21  1245 10/10/21  0448 10/10/21  0448 10/09/21  2004 10/09/21  1522 10/09/21  1522   0000   PROCALCITONIN ng/mL  --   --   --  0.61*  --   --   --   --   --   --    LACTATE mmol/L  --   --   --   --   --  1.7 2.7*  --  2.8*  --    CRP mg/dL 2.94* 6.01* 9.30*  --   --   --   --   --  29.13*   < >   WBC 10*3/mm3 6.26 5.86 9.10  --    < > 7.55  --    < > 10.15  --    HEMOGLOBIN g/dL 10.3* 9.5* 10.3*  --    < > 10.5*  --    < > 11.8*  --    HEMATOCRIT % 33.2* 30.9* 32.8*  --    < > 33.7*  --    < > 37.7  --    MCV fL 85.8 84.4 84.1  --    < > 84.9  --    < > 84.9  --    MCHC g/dL 31.0* 30.7* 31.4*  --    < > 31.2*  --    < > 31.3*  --    PLATELETS 10*3/mm3 357 347 359  --    < > 326  --    < > 365  --    D DIMER QUANT MCGFEU/mL  --   --   --   --   --   --   2.82*  --  3.09*  --     < > = values in this interval not displayed.     Acid/base balance:  Results from last 7 days   Lab Units 10/12/21  0353 10/09/21  2227 10/09/21  1529   PH, ARTERIAL pH units 7.449 7.368 7.386   PO2 ART mm Hg 64.2* 57.6* 43.1*   PCO2, ARTERIAL mm Hg 32.7* 37.1 34.4*   HCO3 ART mmol/L 22.7 21.3 20.6     Renal and electrolytes:  Results from last 7 days   Lab Units 10/13/21  0522 10/12/21  0442 10/11/21  0648 10/10/21  0448 10/10/21  0448 10/09/21  2218 10/09/21  2218 10/09/21  1522 10/09/21  1522   SODIUM mmol/L 137 133* 129*  --  127*  --  131*   < > 128*   POTASSIUM mmol/L 4.5 3.3* 3.9   < > 4.0   < > 4.1   < > 3.8   MAGNESIUM mg/dL  --   --  1.8  --  2.1  --   --   --  2.1   CHLORIDE mmol/L 107 101 99   < > 92*   < > 95*   < > 87*   CO2 mmol/L 21.5* 20.7* 16.3*   < > 16.6*   < > 20.3*   < > 19.8*   BUN mg/dL 41* 47* 58*   < > 62*   < > 61*   < > 61*   CREATININE mg/dL 2.30* 2.30* 2.94*  --  3.18*  --  3.59*   < > 3.90*   EGFR IF NONAFRICN AM mL/min/1.73 22* 22* 16*   < > 15*   < > 13*   < > 12*   CALCIUM mg/dL 8.5* 8.5* 8.5*   < > 8.6   < > 8.3*   < > 8.6   PHOSPHORUS mg/dL  --   --   --   --  4.1  --  3.4  --   --     < > = values in this interval not displayed.     Estimated Creatinine Clearance: 26.1 mL/min (A) (by C-G formula based on SCr of 2.3 mg/dL (H)).    Liver and pancreatic function:  Results from last 7 days   Lab Units 10/11/21  0648 10/10/21  0448 10/09/21  1522   ALBUMIN g/dL 2.56* 2.77* 3.19*   BILIRUBIN mg/dL 0.3 0.4 0.5   ALK PHOS U/L 69 74 92   AST (SGOT) U/L 11 14 16   ALT (SGPT) U/L 9 11 13         Cardiac:  Results from last 7 days   Lab Units 10/09/21  1522   PROBNP pg/mL 581.1     Liver and pancreatic function:  Results from last 7 days   Lab Units 10/11/21  0648 10/10/21  0448 10/09/21  1522   ALBUMIN g/dL 2.56* 2.77* 3.19*   BILIRUBIN mg/dL 0.3 0.4 0.5   ALK PHOS U/L 69 74 92   AST (SGOT) U/L 11 14 16   ALT (SGPT) U/L 9 11 13       Medications :     albuterol  sulfate HFA, 2 puff, Inhalation, 4x Daily - RT  amLODIPine, 5 mg, Oral, Q24H  aspirin, 81 mg, Oral, Daily  cholecalciferol, 50,000 Units, Oral, Weekly  dexamethasone, 6 mg, Intravenous, Daily  enoxaparin, 30 mg, Subcutaneous, Daily  fluticasone, 1 spray, Nasal, BID  hydrOXYzine, 25 mg, Oral, Nightly  insulin aspart, 0-14 Units, Subcutaneous, TID AC  insulin detemir, 30 Units, Subcutaneous, Q12H  levoFLOXacin, 500 mg, Intravenous, Q48H  metroNIDAZOLE, 500 mg, Intravenous, Q8H  micafungin (MYCAMINE) IV, 100 mg, Intravenous, Q24H  montelukast, 10 mg, Oral, Nightly  pravastatin, 10 mg, Oral, Daily  sodium chloride, 3 mL, Intravenous, Q12H      IV Fluids 1000 mL + additives, 75 mL/hr, Last Rate: 75 mL/hr (10/12/21 6467)          Assessment/Plan     1-Acute kidney injury on chronic kidney disease stage IV:   2-Hyponatremia, improving.  3-hyperglycemia  4.Covid pneumonia  5-metabolic acidosis  6-DM  7. Nephrotic range proteinuria likely due to DKD    Cr is 2.3, peaked 4.0. good urine output, oral intake is adquate, will DC IVF  I will sign off, please call if any questions. Pt should have follow up with her primary nephrologist after discharge.     ANA PAULA on CKD stage IV likely pre renal azotemia   Follows Dr. Araiza and was on dialysis for 3 times   4G proteinuria, no significant hematuria.       Eusebio Duarte MD  10/13/21  07:17 EDT

## 2021-10-13 NOTE — PROGRESS NOTES
[facility] HOSPITALIST PROGRESS NOTE     Patient Identification:  Name:  Jessica Bravo  Age:  56 y.o.  Sex:  female  :  1965  MRN:  [id number]  Visit Number:  [id number]  Primary Care Provider:  Buck Wallis APRN    Length of stay:  3    Subjective: Patient seen and evaluated through televideo conference, assisted by Nataliia Salazar RN present inside the room.  Patient is currently in bed complaining of nausea, had one diarrhea today, O2 sat is around 94% on 4 L nasal cannula.  Renal function has continued to improve, nephrology signing off the case, help appreciated.    Chief Complaint: Short of breath  ----------------------------------------------------------------------------------------------------------------------  Current Hospital Meds:  albuterol sulfate HFA, 2 puff, Inhalation, 4x Daily - RT  amLODIPine, 5 mg, Oral, Q24H  aspirin, 81 mg, Oral, Daily  cholecalciferol, 50,000 Units, Oral, Weekly  dexamethasone, 6 mg, Intravenous, Daily  enoxaparin, 30 mg, Subcutaneous, Daily  fluticasone, 1 spray, Nasal, BID  hydrOXYzine, 25 mg, Oral, Nightly  insulin aspart, 0-14 Units, Subcutaneous, TID AC  insulin detemir, 30 Units, Subcutaneous, Q12H  levoFLOXacin, 500 mg, Intravenous, Q48H  metroNIDAZOLE, 500 mg, Intravenous, Q8H  micafungin (MYCAMINE) IV, 100 mg, Intravenous, Q24H  montelukast, 10 mg, Oral, Nightly  [START ON 10/14/2021] pantoprazole, 40 mg, Oral, Q AM  pravastatin, 10 mg, Oral, Daily  sodium chloride, 3 mL, Intravenous, Q12H      IV Fluids 1000 mL + additives, 75 mL/hr, Last Rate: 75 mL/hr (10/12/21 0507)      ----------------------------------------------------------------------------------------------------------------------  Vital Signs:  Temp:  [97.4 °F (36.3 °C)-98.5 °F (36.9 °C)] 97.4 °F (36.3 °C)  Heart Rate:  [66-86] 82  Resp:  [18-20] 20  BP: (138-151)/(63-86) 151/86       Tele: Sinus rhythm 76 bpm      10/09/21  1515 10/10/21  0205   Weight: 74.4 kg (164 lb) 72.6 kg  (160 lb)     Body mass index is 27.46 kg/m².    Intake/Output Summary (Last 24 hours) at 10/13/2021 1609  Last data filed at 10/13/2021 1202  Gross per 24 hour   Intake 2965.26 ml   Output 750 ml   Net 2215.26 ml     Diet Regular; Cardiac, Consistent Carbohydrate, Renal  ----------------------------------------------------------------------------------------------------------------------  This physical exam has been personally performed remotely in the unit aided by real-time audio/visual communication tools.  Nataliia Salazar RN present at bedside during this exam and assisted during exam. The use of a video visit has been reviewed with the patient and verbal informed consent has been obtained.     Physical Exam:  General: Patient appears awake, alert, and in no acute distress.  Head: Normocephalic, atraumatic  Eyes: EOMI. Conjunctivae and sclerae normal.  Ears: Ears appear intact with no abnormalities noted.   Neck: Trachea midline. No obvious JVD.  Heart: Tele reveals sinus rhythm 76 bpm  Lungs: Respirations appear to be regular, even and unlabored with no signs of respiratory distress. No audible wheezing.  Abdomen: No obvious abdominal distension.  MS: Muscle tone appears normal. No gross deformities.  Extremities: No clubbing, cyanosis or edema noted.  Skin: No visible bleeding, bruising, or rash.  Neurologic: Alert and oriented x3. No gross focal deficits.   ----------------------------------------------------------------------------------------------------------------------  ----------------------------------------------------------------------------------------------------------------------  Results from last 7 days   Lab Units 10/10/21  0448 10/09/21  1522   TROPONIN T ng/mL <0.010 0.016     Results from last 7 days   Lab Units 10/13/21  0522 10/12/21  0442 10/11/21  0648 10/10/21  0448 10/10/21  0448 10/09/21  2004 10/09/21  1522 10/09/21  1522   CRP mg/dL 2.94* 6.01* 9.30*  --   --   --    < > 29.13*    LACTATE mmol/L  --   --   --   --  1.7 2.7*  --  2.8*   WBC 10*3/mm3 6.26 5.86 9.10   < > 7.55  --    < > 10.15   HEMOGLOBIN g/dL 10.3* 9.5* 10.3*   < > 10.5*  --    < > 11.8*   HEMATOCRIT % 33.2* 30.9* 32.8*   < > 33.7*  --    < > 37.7   MCV fL 85.8 84.4 84.1   < > 84.9  --    < > 84.9   MCHC g/dL 31.0* 30.7* 31.4*   < > 31.2*  --    < > 31.3*   PLATELETS 10*3/mm3 357 347 359   < > 326  --    < > 365    < > = values in this interval not displayed.     Results from last 7 days   Lab Units 10/12/21  0353   PH, ARTERIAL pH units 7.449   PO2 ART mm Hg 64.2*   PCO2, ARTERIAL mm Hg 32.7*   HCO3 ART mmol/L 22.7     Results from last 7 days   Lab Units 10/13/21  0522 10/12/21  0442 10/11/21  0648 10/10/21  0448 10/10/21  0448 10/09/21  2218 10/09/21  1522   SODIUM mmol/L 137 133* 129*   < > 127*   < > 128*   POTASSIUM mmol/L 4.5 3.3* 3.9   < > 4.0   < > 3.8   MAGNESIUM mg/dL  --   --  1.8  --  2.1  --  2.1   CHLORIDE mmol/L 107 101 99   < > 92*   < > 87*   CO2 mmol/L 21.5* 20.7* 16.3*   < > 16.6*   < > 19.8*   BUN mg/dL 41* 47* 58*   < > 62*   < > 61*   CREATININE mg/dL 2.30* 2.30* 2.94*   < > 3.18*   < > 3.90*   EGFR IF NONAFRICN AM mL/min/1.73 22* 22* 16*   < > 15*   < > 12*   CALCIUM mg/dL 8.5* 8.5* 8.5*   < > 8.6   < > 8.6   GLUCOSE mg/dL 183* 264* 530*   < > 425*   < > 534*   ALBUMIN g/dL  --   --  2.56*  --  2.77*  --  3.19*   BILIRUBIN mg/dL  --   --  0.3  --  0.4  --  0.5   ALK PHOS U/L  --   --  69  --  74  --  92   AST (SGOT) U/L  --   --  11  --  14  --  16   ALT (SGPT) U/L  --   --  9  --  11  --  13    < > = values in this interval not displayed.   Estimated Creatinine Clearance: 26.1 mL/min (A) (by C-G formula based on SCr of 2.3 mg/dL (H)).    No results found for: AMMONIA      Blood Culture   Date Value Ref Range Status   10/09/2021 No growth at 3 days  Preliminary   10/09/2021 No growth at 3 days  Preliminary     Urine Culture   Date Value Ref Range Status   10/09/2021 25,000 CFU/mL Mixed Nikki Isolated   Final     No results found for: WOUNDCX  No results found for: STOOLCX    I have personally looked at the labs and they are summarized above.  ----------------------------------------------------------------------------------------------------------------------  Imaging Results (Last 24 Hours)     ** No results found for the last 24 hours. **        ----------------------------------------------------------------------------------------------------------------------  Assessment and Plan:  -Acute hypoxic respiratory failure  -Sepsis present on admission secondary to COVID-19 pneumonia with concerns for superinfection bacterial pneumonia  -Acute cystitis with budding yeast on culture  -Acute kidney injury CKD stage IV improved  -Proteinuria  -Diabetes type 2 with hyperglycemia  -Metabolic acidosis improved  -D-dimer elevation VQ low probability, venous Doppler pending    CRP is trending down oxygen requirements improved, renal function improved, hyperglycemia improved, for nausea, patient is on Levaquin, would avoid Phenergan for Zofran for now, will try one low dose of Compazine.  Will DC sodium bicarb drip as recommended by nephrology.    Disposition home soon      Brianna Crvaen MD  10/13/21  16:09 EDT

## 2021-10-13 NOTE — PLAN OF CARE
Goal Outcome Evaluation:  Plan of Care Reviewed With: patient           Outcome Summary: Patient demonstrates improved tolerance of ambulation this date with 24' twice. She desaturates into mid 80%s, but recovers quickly with rest.

## 2021-10-13 NOTE — PROGRESS NOTES
PROGRESS NOTE         Patient Identification:  Name:  Jessica Bravo  Age:  56 y.o.  Sex:  female  :  1965  MRN:  [id number]  Visit Number:  [id number]  Primary Care Provider:  Buck Wallis APRN         LOS: 3 days       ----------------------------------------------------------------------------------------------------------------------  Subjective       Chief Complaints:    Hyperglycemia, Vomiting, and Nausea        Interval History:      Discussed case with primary RN.  Patient's oxygen requirements are stable at 6 L humidified nasal cannula.  Nurse reports no acute changes overnight.  Afebrile, no diarrhea.  CRP is improved at 2.94.  WBC remains normal.     ----------------------------------------------------------------------------------------------------------------------      Objective       Current Hospital Meds:  albuterol sulfate HFA, 2 puff, Inhalation, 4x Daily - RT  amLODIPine, 5 mg, Oral, Q24H  aspirin, 81 mg, Oral, Daily  cholecalciferol, 50,000 Units, Oral, Weekly  dexamethasone, 6 mg, Intravenous, Daily  enoxaparin, 30 mg, Subcutaneous, Daily  fluticasone, 1 spray, Nasal, BID  hydrOXYzine, 25 mg, Oral, Nightly  insulin aspart, 0-14 Units, Subcutaneous, TID AC  insulin detemir, 30 Units, Subcutaneous, Q12H  levoFLOXacin, 500 mg, Intravenous, Q48H  metroNIDAZOLE, 500 mg, Intravenous, Q8H  micafungin (MYCAMINE) IV, 100 mg, Intravenous, Q24H  montelukast, 10 mg, Oral, Nightly  pravastatin, 10 mg, Oral, Daily  sodium chloride, 3 mL, Intravenous, Q12H      IV Fluids 1000 mL + additives, 75 mL/hr, Last Rate: 75 mL/hr (10/12/21 7388)      ----------------------------------------------------------------------------------------------------------------------    Vital Signs:  Temp:  [97.4 °F (36.3 °C)-98.5 °F (36.9 °C)] 97.4 °F (36.3 °C)  Heart Rate:  [66-81] 80  Resp:  [18-20] 20  BP: (138-175)/(63-85) 140/80  No data found.  SpO2 Percentage    10/13/21 0300 10/13/21 0700 10/13/21 0823    SpO2: 93% 92% 92%     SpO2:  [92 %-96 %] 92 %  on  Flow (L/min):  [6] 6;   Device (Oxygen Therapy): humidified; nasal cannula    Body mass index is 27.46 kg/m².  Wt Readings from Last 3 Encounters:   10/10/21 72.6 kg (160 lb)   02/05/20 78 kg (172 lb)   10/18/18 79.2 kg (174 lb 9.6 oz)        Intake/Output Summary (Last 24 hours) at 10/13/2021 1120  Last data filed at 10/13/2021 0551  Gross per 24 hour   Intake 2905.26 ml   Output 200 ml   Net 2705.26 ml     Diet Regular; Cardiac, Consistent Carbohydrate, Renal  ----------------------------------------------------------------------------------------------------------------------      Physical Exam:    Deferred due to COVID-19 isolation.  ----------------------------------------------------------------------------------------------------------------------  Results from last 7 days   Lab Units 10/10/21  0448 10/09/21  1522   TROPONIN T ng/mL <0.010 0.016     Results from last 7 days   Lab Units 10/09/21  1522   PROBNP pg/mL 581.1       Results from last 7 days   Lab Units 10/12/21  0353   PH, ARTERIAL pH units 7.449   PO2 ART mm Hg 64.2*   PCO2, ARTERIAL mm Hg 32.7*   HCO3 ART mmol/L 22.7     Results from last 7 days   Lab Units 10/13/21  0522 10/12/21  0442 10/11/21  0648 10/10/21  0448 10/10/21  0448 10/09/21  2004 10/09/21  1522 10/09/21  1522   CRP mg/dL 2.94* 6.01* 9.30*  --   --   --    < > 29.13*   LACTATE mmol/L  --   --   --   --  1.7 2.7*  --  2.8*   WBC 10*3/mm3 6.26 5.86 9.10   < > 7.55  --    < > 10.15   HEMOGLOBIN g/dL 10.3* 9.5* 10.3*   < > 10.5*  --    < > 11.8*   HEMATOCRIT % 33.2* 30.9* 32.8*   < > 33.7*  --    < > 37.7   MCV fL 85.8 84.4 84.1   < > 84.9  --    < > 84.9   MCHC g/dL 31.0* 30.7* 31.4*   < > 31.2*  --    < > 31.3*   PLATELETS 10*3/mm3 357 347 359   < > 326  --    < > 365    < > = values in this interval not displayed.     Results from last 7 days   Lab Units 10/13/21  0522 10/12/21  0442 10/11/21  0648 10/10/21  0448 10/10/21  0448  10/09/21  2218 10/09/21  1522   SODIUM mmol/L 137 133* 129*   < > 127*   < > 128*   POTASSIUM mmol/L 4.5 3.3* 3.9   < > 4.0   < > 3.8   MAGNESIUM mg/dL  --   --  1.8  --  2.1  --  2.1   CHLORIDE mmol/L 107 101 99   < > 92*   < > 87*   CO2 mmol/L 21.5* 20.7* 16.3*   < > 16.6*   < > 19.8*   BUN mg/dL 41* 47* 58*   < > 62*   < > 61*   CREATININE mg/dL 2.30* 2.30* 2.94*   < > 3.18*   < > 3.90*   EGFR IF NONAFRICN AM mL/min/1.73 22* 22* 16*   < > 15*   < > 12*   CALCIUM mg/dL 8.5* 8.5* 8.5*   < > 8.6   < > 8.6   GLUCOSE mg/dL 183* 264* 530*   < > 425*   < > 534*   ALBUMIN g/dL  --   --  2.56*  --  2.77*  --  3.19*   BILIRUBIN mg/dL  --   --  0.3  --  0.4  --  0.5   ALK PHOS U/L  --   --  69  --  74  --  92   AST (SGOT) U/L  --   --  11  --  14  --  16   ALT (SGPT) U/L  --   --  9  --  11  --  13    < > = values in this interval not displayed.   Estimated Creatinine Clearance: 26.1 mL/min (A) (by C-G formula based on SCr of 2.3 mg/dL (H)).  No results found for: AMMONIA    Glucose   Date/Time Value Ref Range Status   10/13/2021 0714 135 (H) 70 - 130 mg/dL Final     Comment:     Meter: [id number] : [id number] rosales wallace   10/12/2021 2022 241 (H) 70 - 130 mg/dL Final     Comment:     Meter: [id number] : [id number] vania kennedy   10/12/2021 1625 143 (H) 70 - 130 mg/dL Final     Comment:     Meter: [id number] : [id number] Martin William   10/12/2021 1041 239 (H) 70 - 130 mg/dL Final     Comment:     Meter: [id number] : [id number] Martin William   10/12/2021 0830 277 (H) 70 - 130 mg/dL Final     Comment:     Meter: [id number] : [id number] Martin William   10/11/2021 2036 339 (H) 70 - 130 mg/dL Final     Comment:     Meter: [id number] : [id number] EMERSON MCKINNEY   10/11/2021 1605 339 (H) 70 - 130 mg/dL Final     Comment:     Meter: [id number] : [id number] Martin William   10/11/2021 1032 446 (C) 70 - 130 mg/dL Final     Comment:     RN Notified Meter: [id number] : [id number] Roberta Rivera      Lab Results   Component Value Date    HGBA1C 10.50 (H) 10/09/2021     Lab Results   Component Value Date    TSH 0.397 10/09/2021       Blood Culture   Date Value Ref Range Status   10/09/2021 No growth at 24 hours  Preliminary   10/09/2021 No growth at 24 hours  Preliminary     Urine Culture   Date Value Ref Range Status   10/09/2021 25,000 CFU/mL Mixed Bubba Isolated  Final     No results found for: WOUNDCX  No results found for: STOOLCX  No results found for: RESPCX  Pain Management Panel       Pain Management Panel Latest Ref Rng & Units 10/10/2021    CREATININE UR mg/dL 41.4              ----------------------------------------------------------------------------------------------------------------------  Imaging Results (Last 24 Hours)       ** No results found for the last 24 hours. **            ----------------------------------------------------------------------------------------------------------------------    Assessment/Plan       Assessment/Plan     ASSESSMENT:    1.  Severe sepsis with lactic acid greater than 2 on admission  2.  COVID-19 pneumonia   3.  Superimposed bacterial pneumonia  4.  UTI    PLAN:    Discussed case with primary RN.  Patient's oxygen requirements are stable at 6 L humidified nasal cannula.  Nurse reports no acute changes overnight.  Afebrile, no diarrhea.  CRP is improved at 2.94.  WBC remains normal.     Urinalysis on 10/9/2021 was positive with 31-50 WBCs, 1+ bacteria, and 3+ budding yeast. Urine culture finalized as 25,000 colonies of mixed bubba. Nuclear medicine lung scan on 10/9/2021 showed low probability of PE on the basis of perfusion only nuclear medicine study.  CT chest without contrast on 10/9/2021 showed extensive patchy groundglass infiltrates scattered throughout both lungs, consistent with multifocal pneumonia.  COVID-19 reporting criteria: Typical.  Commonly reported imaging features of COVID-19 pneumonia are present.  Other processes such as influenza  pneumonia and organizing pneumonia can cause a similar imaging pattern.  Hepatic steatosis.  Multiple prominent mediastinal lymph nodes are likely reactive.  Follow-up recommended.  Chest x-ray on 10/9/2021 showed extensive bilateral patchy airspace disease.  Commonly reported imaging features of COVID-19 pneumonia are present.  Other processes such as influenza pneumonia and organizing pneumonia can cause a similar imaging pattern.  COVID-19 and flu A/B PCR on 10/9/2021 was positive.  Mycoplasma pneumonia antibody on 10/10/2021 was negative.  Strep pneumo antigen on 10/10/2021 is in process.  Blood cultures on 10/9/2021 are in process.     Would recommend Decadron, as patient is requiring supplemental oxygen.  Recommend to consider remdesivir even in the setting of end-stage renal disease as benefit may outweigh risk.     For now, we are agreeable with Levaquin and Flagyl for pneumonia coverage.  As urinanalysis showed 3+ budding yeast, recommend to continue on micafungin 100 mg IV every 24 hours.  We will follow closely and adjust antibiotic therapy as appropriate.    Code Status:   Code Status and Medical Interventions:   Ordered at: 10/10/21 0104     Level Of Support Discussed With:    Patient     Code Status:    CPR     Medical Interventions (Level of Support Prior to Arrest):    Full       JOHN Holm  10/13/21  11:20 EDT

## 2021-10-13 NOTE — THERAPY TREATMENT NOTE
Acute Care - Physical Therapy Treatment Note  CELI Alvarado     Patient Name: Jessica Bravo  : 1965  MRN: [id number]  Today's Date: 10/13/2021   Onset of Illness/Injury or Date of Surgery: 10/09/21  Visit Dx:     ICD-10-CM ICD-9-CM   1. Pneumonia of both lower lobes due to infectious organism  J18.9 486   2. Hyperglycemia  R73.9 790.29     Patient Active Problem List   Diagnosis   • Essential hypertension   • Lower extremity edema   • Pneumonia of both lower lobes due to infectious organism     Past Medical History:   Diagnosis Date   • Chronic kidney disease     only has one kidney, [facility]    • ESRD on hemodialysis (HCC)    • Essential hypertension    • Hepatic steatosis    • History of noncompliance with medical treatment    • Pre-transplant evaluation for kidney transplant 2021    Declined by the [facility]   • Single kidney    • Type 2 diabetes mellitus (HCC)      Past Surgical History:   Procedure Laterality Date   • HYSTERECTOMY       PT Assessment (last 12 hours)     PT Evaluation and Treatment     Row Name 10/13/21 1614          Physical Therapy Time and Intention    Subjective Information complains of; fatigue; dyspnea  -CW     Document Type evaluation  -CW     Mode of Treatment physical therapy  -CW     Patient Effort adequate  -CW     Symptoms Noted During/After Treatment fatigue  -CW     Comment Patient agreeable to physical therapy treatment this date. She reports that she sat up most of the day yesterday.  -CW     Row Name 10/13/21 1614          General Information    Patient Profile Reviewed yes  -CW     Patient Observations alert; cooperative; agree to therapy  -CW     Equipment Currently Used at Home none  -CW     Row Name 10/13/21 1614          Cognition    Affect/Mental Status (Cognitive) WFL  -CW     Orientation Status (Cognition) oriented x 4  -CW     Follows Commands (Cognition) WFL  -CW     Row Name 10/13/21 1614          Pain Scale:  Word Pre/Post-Treatment    Pre/Posttreatment Pain Comment Patient reports no pain during physical therapy treatment this date.  -CW     Row Name 10/13/21 1614          Bed Mobility    Bed Mobility bed mobility (all) activities  -CW     All Activities, [place] (Bed Mobility) modified independence  -CW     Assistive Device (Bed Mobility) bed rails  -     Row Name 10/13/21 1614          Transfers    Transfers sit-stand transfer; stand-sit transfer  -CW     Sit-Stand [place] (Transfers) supervision  -CW     Stand-Sit [place] (Transfers) supervision  -CW     Row Name 10/13/21 1614          Gait/Stairs (Locomotion)    [place] Level (Gait) standby assist  -CW     Distance in Feet (Gait) 24 x 2  -CW     Pattern (Gait) step-to  -CW     Deviations/Abnormal Patterns (Gait) gait speed decreased  -     Row Name 10/13/21 1614          Safety Issues, Functional Mobility    Impairments Affecting Function (Mobility) pain; shortness of breath; endurance/activity tolerance  -     Row Name 10/13/21 1614          Coping    Observed Emotional State calm; cooperative  -     Row Name 10/13/21 1614          Plan of Care Review    Plan of Care Reviewed With patient  -CW     Outcome Summary Patient demonstrates improved tolerance of ambulation this date with 24' twice. She desaturates into mid 80%s, but recovers quickly with rest.  -     Row Name 10/13/21 1614          Physical Therapy Goals    Gait Training Goal Selection (PT) gait training, PT goal 1  -     Row Name 10/13/21 1614          Gait Training Goal 1 (PT)    Activity/Assistive Device (Gait Training Goal 1, PT) gait (walking locomotion); decrease fall risk; diminish gait deviation; improve balance and speed; increase endurance/gait distance  -CW     [place] Level (Gait Training Goal 1, PT) independent  -CW     Distance (Gait Training Goal 1, PT) 500  -CW     Time Frame (Gait Training Goal 1, PT) by discharge  -     Row Name 10/13/21 1614           Positioning and Restraints    Pre-Treatment Position in bed  -CW     In Bed supine; call light within reach; encouraged to call for assist  -CW     Row Name 10/13/21 1614          Therapy Assessment/Plan (PT)    Rehab Potential (PT) good, to achieve stated therapy goals  -CW     Criteria for Skilled Interventions Met (PT) yes; meets criteria  -CW     Row Name 10/13/21 1614          Progress Summary (PT)    Progress Toward Functional Goals (PT) progress toward functional goals is fair  -CW           User Key  (r) = Recorded By, (t) = Taken By, (c) = Cosigned By    Initials Name Provider Type    Josue Coley PT Physical Therapist                PT Recommendation and Plan  Anticipated Discharge Disposition (PT): home with assist  Planned Therapy Interventions (PT): balance training, gait training, home exercise program, patient/family education, stair training, strengthening  Therapy Frequency (PT): 3 times/wk (3-5 times/week)  Progress Summary (PT)  Progress Toward Functional Goals (PT): progress toward functional goals is fair  Plan of Care Reviewed With: patient  Outcome Summary: Patient demonstrates improved tolerance of ambulation this date with 24' twice. She desaturates into mid 80%s, but recovers quickly with rest.       Time Calculation:    PT Charges     Row Name 10/13/21 1620             Time Calculation    PT Received On 10/13/21  -CW              Time Calculation- PT    Total Timed Code Minutes- PT 39 minute(s)  -CW            User Key  (r) = Recorded By, (t) = Taken By, (c) = Cosigned By    Initials Name Provider Type    Josue Coley PT Physical Therapist              Therapy Charges for Today     Code Description Service Date Service Provider Modifiers Qty    15768375133 HC PT THERAPEUTIC ACT EA 15 MIN 10/13/2021 Josue Alberto PT GP 2    28287655932 HC GAIT TRAINING EA 15 MIN 10/13/2021 Josue Alberto PT GP 1               Josue Alberto PT  10/13/2021

## 2021-10-13 NOTE — CASE MANAGEMENT/SOCIAL WORK
Discharge Planning Assessment   Christiano     Patient Name: Jessica Bravo  MRN: [id number]  Today's Date: 10/13/2021    Admit Date: 10/9/2021     Discharge Needs Assessment    No documentation.                Discharge Plan     Row Name 10/13/21 1310       Plan    Plan Pt was admitted on 10/09/21. Pt lives with her significant other, Vaughn and plans to return home at discharge. Pt does not utilize home health services at this time. Pt has a standard walker, cane and glucometer at home. SS to follow.                Gricelda Isidro

## 2021-10-13 NOTE — PLAN OF CARE
Goal Outcome Evaluation:  Plan of Care Reviewed With: patient        Progress: no change  Outcome Summary: pt resting, no changes, will continue to monitor

## 2021-10-13 NOTE — PLAN OF CARE
Goal Outcome Evaluation:           Progress: improving  Outcome Summary: Patient is doing well today. O2 decreased to 4L sats continue in the mid 90s.  Patient paticipated with physical therapy.  Will continue to monitor.

## 2021-10-14 LAB
027 TOXIN: NORMAL
BACTERIA SPEC AEROBE CULT: NORMAL
BACTERIA SPEC AEROBE CULT: NORMAL
C DIFF TOX GENS STL QL NAA+PROBE: NEGATIVE
CRP SERPL-MCNC: 1.44 MG/DL (ref 0–0.5)
GLUCOSE BLDC GLUCOMTR-MCNC: 189 MG/DL (ref 70–130)
GLUCOSE BLDC GLUCOMTR-MCNC: 202 MG/DL (ref 70–130)
GLUCOSE BLDC GLUCOMTR-MCNC: 244 MG/DL (ref 70–130)
GLUCOSE BLDC GLUCOMTR-MCNC: 273 MG/DL (ref 70–130)

## 2021-10-14 PROCEDURE — 25010000002 MICAFUNGIN SODIUM 100 MG RECONSTITUTED SOLUTION 1 EACH VIAL: Performed by: PHYSICIAN ASSISTANT

## 2021-10-14 PROCEDURE — 82962 GLUCOSE BLOOD TEST: CPT

## 2021-10-14 PROCEDURE — 97166 OT EVAL MOD COMPLEX 45 MIN: CPT

## 2021-10-14 PROCEDURE — 99232 SBSQ HOSP IP/OBS MODERATE 35: CPT | Performed by: HOSPITALIST

## 2021-10-14 PROCEDURE — 94799 UNLISTED PULMONARY SVC/PX: CPT

## 2021-10-14 PROCEDURE — 63710000001 INSULIN ASPART PER 5 UNITS: Performed by: HOSPITALIST

## 2021-10-14 PROCEDURE — 63710000001 INSULIN DETEMIR PER 5 UNITS: Performed by: HOSPITALIST

## 2021-10-14 PROCEDURE — 86140 C-REACTIVE PROTEIN: CPT | Performed by: PHYSICIAN ASSISTANT

## 2021-10-14 PROCEDURE — 25010000002 LEVOFLOXACIN PER 250 MG: Performed by: INTERNAL MEDICINE

## 2021-10-14 PROCEDURE — 25010000002 ENOXAPARIN PER 10 MG: Performed by: INTERNAL MEDICINE

## 2021-10-14 PROCEDURE — 25010000002 DEXAMETHASONE PER 1 MG: Performed by: HOSPITALIST

## 2021-10-14 RX ORDER — LOPERAMIDE HYDROCHLORIDE 2 MG/1
2 CAPSULE ORAL ONCE
Status: COMPLETED | OUTPATIENT
Start: 2021-10-14 | End: 2021-10-14

## 2021-10-14 RX ADMIN — SODIUM CHLORIDE, PRESERVATIVE FREE 3 ML: 5 INJECTION INTRAVENOUS at 20:23

## 2021-10-14 RX ADMIN — ALBUTEROL SULFATE 2 PUFF: 90 AEROSOL, METERED RESPIRATORY (INHALATION) at 06:00

## 2021-10-14 RX ADMIN — DEXAMETHASONE SODIUM PHOSPHATE 6 MG: 4 INJECTION, SOLUTION INTRA-ARTICULAR; INTRALESIONAL; INTRAMUSCULAR; INTRAVENOUS; SOFT TISSUE at 09:06

## 2021-10-14 RX ADMIN — ALBUTEROL SULFATE 2 PUFF: 90 AEROSOL, METERED RESPIRATORY (INHALATION) at 00:32

## 2021-10-14 RX ADMIN — PANTOPRAZOLE SODIUM 40 MG: 40 TABLET, DELAYED RELEASE ORAL at 05:59

## 2021-10-14 RX ADMIN — MICAFUNGIN SODIUM 100 MG: 100 INJECTION, POWDER, LYOPHILIZED, FOR SOLUTION INTRAVENOUS at 16:52

## 2021-10-14 RX ADMIN — ASPIRIN 81 MG: 81 TABLET, COATED ORAL at 09:06

## 2021-10-14 RX ADMIN — AMLODIPINE BESYLATE 5 MG: 5 TABLET ORAL at 09:05

## 2021-10-14 RX ADMIN — ENOXAPARIN SODIUM 30 MG: 30 INJECTION SUBCUTANEOUS at 09:06

## 2021-10-14 RX ADMIN — INSULIN DETEMIR 30 UNITS: 100 INJECTION, SOLUTION SUBCUTANEOUS at 09:05

## 2021-10-14 RX ADMIN — METRONIDAZOLE 500 MG: 500 INJECTION, SOLUTION INTRAVENOUS at 11:05

## 2021-10-14 RX ADMIN — FLUTICASONE PROPIONATE 1 SPRAY: 50 SPRAY, METERED NASAL at 09:06

## 2021-10-14 RX ADMIN — INSULIN ASPART 5 UNITS: 100 INJECTION, SOLUTION INTRAVENOUS; SUBCUTANEOUS at 16:52

## 2021-10-14 RX ADMIN — INSULIN ASPART 5 UNITS: 100 INJECTION, SOLUTION INTRAVENOUS; SUBCUTANEOUS at 11:05

## 2021-10-14 RX ADMIN — SODIUM CHLORIDE, PRESERVATIVE FREE 3 ML: 5 INJECTION INTRAVENOUS at 09:10

## 2021-10-14 RX ADMIN — INSULIN ASPART 3 UNITS: 100 INJECTION, SOLUTION INTRAVENOUS; SUBCUTANEOUS at 09:06

## 2021-10-14 RX ADMIN — ALBUTEROL SULFATE 2 PUFF: 90 AEROSOL, METERED RESPIRATORY (INHALATION) at 20:25

## 2021-10-14 RX ADMIN — LOPERAMIDE HYDROCHLORIDE 2 MG: 2 CAPSULE ORAL at 06:00

## 2021-10-14 RX ADMIN — LEVOFLOXACIN 500 MG: 5 INJECTION, SOLUTION INTRAVENOUS at 09:10

## 2021-10-14 RX ADMIN — HYDROXYZINE HYDROCHLORIDE 25 MG: 25 TABLET ORAL at 20:24

## 2021-10-14 RX ADMIN — INSULIN DETEMIR 30 UNITS: 100 INJECTION, SOLUTION SUBCUTANEOUS at 20:27

## 2021-10-14 RX ADMIN — METRONIDAZOLE 500 MG: 500 INJECTION, SOLUTION INTRAVENOUS at 20:23

## 2021-10-14 RX ADMIN — FLUTICASONE PROPIONATE 1 SPRAY: 50 SPRAY, METERED NASAL at 20:24

## 2021-10-14 RX ADMIN — MONTELUKAST SODIUM 10 MG: 10 TABLET, COATED ORAL at 20:24

## 2021-10-14 NOTE — PLAN OF CARE
Goal Outcome Evaluation:           Progress: no change  Outcome Summary: Pt resting in bed. No complaints of pain noted. Pt states she is feeling a little better. O2 on 3L NC. No other acute changes noted.

## 2021-10-14 NOTE — THERAPY EVALUATION
Acute Care - Occupational Therapy Initial Evaluation  CELI Alvarado     Patient Name: Jessica Bravo  : 1965  MRN: [id number]  Today's Date: 10/14/2021  Onset of Illness/Injury or Date of Surgery: 10/09/21     Referring Physician: Herber    Admit Date: 10/9/2021       ICD-10-CM ICD-9-CM   1. Pneumonia of both lower lobes due to infectious organism  J18.9 486   2. Hyperglycemia  R73.9 790.29     Patient Active Problem List   Diagnosis   • Essential hypertension   • Lower extremity edema   • Pneumonia of both lower lobes due to infectious organism     Past Medical History:   Diagnosis Date   • Chronic kidney disease     only has one kidney, [facility]    • ESRD on hemodialysis (HCC)    • Essential hypertension    • Hepatic steatosis    • History of noncompliance with medical treatment    • Pre-transplant evaluation for kidney transplant 2021    Declined by the [facility]   • Single kidney    • Type 2 diabetes mellitus (HCC)      Past Surgical History:   Procedure Laterality Date   • HYSTERECTOMY           OT ASSESSMENT FLOWSHEET (last 12 hours)     OT Evaluation and Treatment     Row Name 10/14/21 1038                   OT Time and Intention    Document Type evaluation  -KR        Mode of Treatment occupational therapy  -KR        Patient Effort adequate  -KR                  General Information    General Observations of Patient alert/cooperative  -KR        Prior Level of Function independent:  -KR                  Living Environment    Current Living Arrangements home/apartment/condo  -KR        Lives With significant other  -KR                  Cognition    Affect/Mental Status (Cognitive) WFL  -KR        Orientation Status (Cognition) oriented x 3  -KR        Follows Commands (Cognition) WFL  -KR                  Range of Motion (ROM)    Range of Motion ROM is WFL; bilateral upper extremities  -KR                  Strength Comprehensive (MMT)    Comment, General  Manual Muscle Testing (MMT) Assessment BUE 3+/5  -KR                  Bed Mobility    Bed Mobility supine-sit  -KR        Supine-Sit [place] (Bed Mobility) minimum assist (75% patient effort)  -KR                  Activities of Daily Living    BADL Assessment/Intervention bathing; upper body dressing; lower body dressing; grooming; feeding; toileting  -KR                  Bathing Assessment/Intervention    [place] Level (Bathing) bathing skills; minimum assist (75% patient effort)  -KR                  Upper Body Dressing Assessment/Training    [place] Level (Upper Body Dressing) upper body dressing skills; set up  -KR                  Lower Body Dressing Assessment/Training    [place] Level (Lower Body Dressing) lower body dressing skills; minimum assist (75% patient effort)  -KR                  Grooming Assessment/Training    [place] Level (Grooming) grooming skills; set up  -KR                  Self-Feeding Assessment/Training    [place] Level (Feeding) feeding skills; set up  -KR                  Toileting Assessment/Training    [place] Level (Toileting) toileting skills; minimum assist (75% patient effort)  -KR                  Plan of Care Review    Plan of Care Reviewed With patient  -KR                  OT Goals    Strength Goal Selection (OT) strength, OT goal 1  -KR        Activity Tolerance Goal Selection (OT) activity tolerance, OT goal 1  -KR                  Strength Goal 1 (OT)    Strength Goal 1 (OT) BUE increase x 1 to enhance self care skills  -KR        Time Frame (Strength Goal 1, OT) by discharge  -KR                   Activity Tolerance Goal 1 (OT)    Activity Tolerance Goal 1 (OT) Increase to enhance performance of self care / mobility  -KR        Activity Level (Endurance Goal 1, OT) 20 min activity  -KR        Time Frame (Activity Tolerance Goal 1, OT) by discharge  -KR                  Positioning and Restraints    Pre-Treatment Position in bed  -KR         Post Treatment Position bed  -KR        In Bed sitting EOB; call light within reach; encouraged to call for assist  -KR                  Therapy Assessment/Plan (OT)    Rehab Potential (OT) good, to achieve stated therapy goals  -KR        Criteria for Skilled Therapeutic Interventions Met (OT) yes  -KR        Planned Therapy Interventions (OT) activity tolerance training; strengthening exercise  -KR                  Therapy Plan Review/Discharge Plan (OT)    Anticipated Discharge Disposition (OT) home with assist  -KR              User Key  (r) = Recorded By, (t) = Taken By, (c) = Cosigned By    Initials Name Effective Dates    Michael Miller OT 06/16/21 -                        OT Recommendation and Plan  Planned Therapy Interventions (OT): activity tolerance training, strengthening exercise  Plan of Care Review  Plan of Care Reviewed With: patient  Plan of Care Reviewed With: patient        Time Calculation:     Therapy Charges for Today     Code Description Service Date Service Provider Modifiers Qty    [id number]  OT EVAL MOD COMPLEXITY 4 10/14/2021 Michael Hernández OT GO 1               Michael Hernández OT  10/14/2021

## 2021-10-14 NOTE — PROGRESS NOTES
PROGRESS NOTE         Patient Identification:  Name:  Jessica Bravo  Age:  56 y.o.  Sex:  female  :  1965  MRN:  [id number]  Visit Number:  [id number]  Primary Care Provider:  Buck Wallis APRN         LOS: 4 days       ----------------------------------------------------------------------------------------------------------------------  Subjective       Chief Complaints:    Hyperglycemia, Vomiting, and Nausea        Interval History:      Patient with improving oxygen requirements 4 L per nasal cannula today with no apparent distress.  Afebrile, no diarrhea.  CRP improving at 1.44.    ----------------------------------------------------------------------------------------------------------------------      Objective       Current Hospital Meds:  albuterol sulfate HFA, 2 puff, Inhalation, 4x Daily - RT  amLODIPine, 5 mg, Oral, Q24H  aspirin, 81 mg, Oral, Daily  cholecalciferol, 50,000 Units, Oral, Weekly  dexamethasone, 6 mg, Intravenous, Daily  enoxaparin, 30 mg, Subcutaneous, Daily  fluticasone, 1 spray, Nasal, BID  hydrOXYzine, 25 mg, Oral, Nightly  insulin aspart, 0-14 Units, Subcutaneous, TID AC  insulin detemir, 30 Units, Subcutaneous, Q12H  levoFLOXacin, 500 mg, Intravenous, Q48H  metroNIDAZOLE, 500 mg, Intravenous, Q8H  micafungin (MYCAMINE) IV, 100 mg, Intravenous, Q24H  montelukast, 10 mg, Oral, Nightly  pantoprazole, 40 mg, Oral, Q AM  pravastatin, 10 mg, Oral, Daily  sodium chloride, 3 mL, Intravenous, Q12H         ----------------------------------------------------------------------------------------------------------------------    Vital Signs:  Temp:  [97.4 °F (36.3 °C)-98.1 °F (36.7 °C)] 98.1 °F (36.7 °C)  Heart Rate:  [73-88] 73  Resp:  [20-22] 20  BP: (132-160)/(72-86) 159/72  No data found.  SpO2 Percentage    10/13/21 1900 10/14/21 0300 10/14/21 0700   SpO2: 93% 95% 97%     SpO2:  [92 %-97 %] 97 %  on  Flow (L/min):  [4-5] 4;   Device (Oxygen Therapy): nasal cannula    Body  mass index is 27.46 kg/m².  Wt Readings from Last 3 Encounters:   10/10/21 72.6 kg (160 lb)   02/05/20 78 kg (172 lb)   10/18/18 79.2 kg (174 lb 9.6 oz)        Intake/Output Summary (Last 24 hours) at 10/14/2021 0953  Last data filed at 10/14/2021 0608  Gross per 24 hour   Intake 997.57 ml   Output 200 ml   Net 797.57 ml     Diet Regular; Cardiac, Consistent Carbohydrate, Renal  ----------------------------------------------------------------------------------------------------------------------      Physical Exam:    Deferred due to COVID-19 isolation.  ----------------------------------------------------------------------------------------------------------------------  Results from last 7 days   Lab Units 10/10/21  0448 10/09/21  1522   TROPONIN T ng/mL <0.010 0.016     Results from last 7 days   Lab Units 10/09/21  1522   PROBNP pg/mL 581.1       Results from last 7 days   Lab Units 10/12/21  0353   PH, ARTERIAL pH units 7.449   PO2 ART mm Hg 64.2*   PCO2, ARTERIAL mm Hg 32.7*   HCO3 ART mmol/L 22.7     Results from last 7 days   Lab Units 10/14/21  0518 10/13/21  0522 10/12/21  0442 10/11/21  0648 10/11/21  0648 10/10/21  0448 10/10/21  0448 10/09/21  2004 10/09/21  1522 10/09/21  1522   CRP mg/dL 1.44* 2.94* 6.01*   < > 9.30*  --   --   --    < > 29.13*   LACTATE mmol/L  --   --   --   --   --   --  1.7 2.7*  --  2.8*   WBC 10*3/mm3  --  6.26 5.86  --  9.10   < > 7.55  --    < > 10.15   HEMOGLOBIN g/dL  --  10.3* 9.5*  --  10.3*   < > 10.5*  --    < > 11.8*   HEMATOCRIT %  --  33.2* 30.9*  --  32.8*   < > 33.7*  --    < > 37.7   MCV fL  --  85.8 84.4  --  84.1   < > 84.9  --    < > 84.9   MCHC g/dL  --  31.0* 30.7*  --  31.4*   < > 31.2*  --    < > 31.3*   PLATELETS 10*3/mm3  --  357 347  --  359   < > 326  --    < > 365    < > = values in this interval not displayed.     Results from last 7 days   Lab Units 10/13/21  0522 10/12/21  0442 10/11/21  0648 10/10/21  0448 10/10/21  0448 10/09/21  2213  10/09/21  1522   SODIUM mmol/L 137 133* 129*   < > 127*   < > 128*   POTASSIUM mmol/L 4.5 3.3* 3.9   < > 4.0   < > 3.8   MAGNESIUM mg/dL  --   --  1.8  --  2.1  --  2.1   CHLORIDE mmol/L 107 101 99   < > 92*   < > 87*   CO2 mmol/L 21.5* 20.7* 16.3*   < > 16.6*   < > 19.8*   BUN mg/dL 41* 47* 58*   < > 62*   < > 61*   CREATININE mg/dL 2.30* 2.30* 2.94*   < > 3.18*   < > 3.90*   EGFR IF NONAFRICN AM mL/min/1.73 22* 22* 16*   < > 15*   < > 12*   CALCIUM mg/dL 8.5* 8.5* 8.5*   < > 8.6   < > 8.6   GLUCOSE mg/dL 183* 264* 530*   < > 425*   < > 534*   ALBUMIN g/dL  --   --  2.56*  --  2.77*  --  3.19*   BILIRUBIN mg/dL  --   --  0.3  --  0.4  --  0.5   ALK PHOS U/L  --   --  69  --  74  --  92   AST (SGOT) U/L  --   --  11  --  14  --  16   ALT (SGPT) U/L  --   --  9  --  11  --  13    < > = values in this interval not displayed.   Estimated Creatinine Clearance: 26.1 mL/min (A) (by C-G formula based on SCr of 2.3 mg/dL (H)).  No results found for: AMMONIA    Glucose   Date/Time Value Ref Range Status   10/14/2021 0708 189 (H) 70 - 130 mg/dL Final     Comment:     Meter: [id number] : [id number] rosales wallace   10/13/2021 2138 252 (H) 70 - 130 mg/dL Final     Comment:     Meter: [id number] : [id number] vania kennedy   10/13/2021 1641 201 (H) 70 - 130 mg/dL Final     Comment:     Meter: [id number] : [id number] Martin William   10/13/2021 1151 239 (H) 70 - 130 mg/dL Final     Comment:     Meter: [id number] : [id number] Martin William   10/13/2021 0714 135 (H) 70 - 130 mg/dL Final     Comment:     Meter: [id number] : [id number] rosales wallace   10/12/2021 2022 241 (H) 70 - 130 mg/dL Final     Comment:     Meter: [id number] : [id number] vania kennedy   10/12/2021 1625 143 (H) 70 - 130 mg/dL Final     Comment:     Meter: [id number] : [id number] Martin William   10/12/2021 1041 239 (H) 70 - 130 mg/dL Final     Comment:     Meter: [id number] : [id number] Martin William     Lab Results    Component Value Date    HGBA1C 10.50 (H) 10/09/2021     Lab Results   Component Value Date    TSH 0.397 10/09/2021       Blood Culture   Date Value Ref Range Status   10/09/2021 No growth at 24 hours  Preliminary   10/09/2021 No growth at 24 hours  Preliminary     Urine Culture   Date Value Ref Range Status   10/09/2021 25,000 CFU/mL Mixed Nikki Isolated  Final     No results found for: WOUNDCX  No results found for: STOOLCX  No results found for: RESPCX  Pain Management Panel       Pain Management Panel Latest Ref Rng & Units 10/10/2021    CREATININE UR mg/dL 41.4              ----------------------------------------------------------------------------------------------------------------------  Imaging Results (Last 24 Hours)       Procedure Component Value Units Date/Time    US Venous Doppler Lower Extremity Bilateral (duplex) [[id number]] Collected: 10/13/21 2237     Updated: 10/13/21 2239    Narrative:      VENOUS DOPPLER ULTRASOUND, BILATERAL LOWER EXTREMITY, 10/13/2021    HISTORY:   56-year-old female hospital inpatient with COVID-19 pneumonia, shortness of air. D-dimer is elevated. Assess for DVT.    TECHNIQUE:   Venous Doppler ultrasound examination of both legs was performed using grey-scale, spectral Doppler, and color flow Doppler ultrasound imaging. The protocol does not include the greater saphenous vein beyond the saphenofemoral junction or the anterior  tibial vein.    FINDINGS:   The examination is negative.  There is no evidence of deep venous thrombosis from the groin to the lower calf on the right or left. Saphenofemoral junctions are patent.      Impression:      Negative examination.  No evidence of right or left lower extremity DVT.    Signer Name: Paulie Jensen MD   Signed: 10/13/2021 10:37 PM   Workstation Name: [email or web address]    [facility]             ----------------------------------------------------------------------------------------------------------------------    Assessment/Plan       Assessment/Plan     ASSESSMENT:    1.  Severe sepsis with lactic acid greater than 2 on admission  2.  COVID-19 pneumonia   3.  Superimposed bacterial pneumonia  4.  UTI    PLAN:    Patient with improving oxygen requirements 4 L per nasal cannula today with no apparent distress.  Afebrile, no diarrhea.  CRP improving at 1.44.    Urinalysis on 10/9/2021 was positive with 31-50 WBCs, 1+ bacteria, and 3+ budding yeast. Urine culture finalized as 25,000 colonies of mixed bubba. Nuclear medicine lung scan on 10/9/2021 showed low probability of PE on the basis of perfusion only nuclear medicine study.  CT chest without contrast on 10/9/2021 showed extensive patchy groundglass infiltrates scattered throughout both lungs, consistent with multifocal pneumonia.  COVID-19 reporting criteria: Typical.  Commonly reported imaging features of COVID-19 pneumonia are present.  Other processes such as influenza pneumonia and organizing pneumonia can cause a similar imaging pattern.  Hepatic steatosis.  Multiple prominent mediastinal lymph nodes are likely reactive.  Follow-up recommended.  Chest x-ray on 10/9/2021 showed extensive bilateral patchy airspace disease.  Commonly reported imaging features of COVID-19 pneumonia are present.  Other processes such as influenza pneumonia and organizing pneumonia can cause a similar imaging pattern.  COVID-19 and flu A/B PCR on 10/9/2021 was positive.  Mycoplasma pneumonia antibody on 10/10/2021 was negative.  Strep pneumo antigen on 10/10/2021 negative.  Blood cultures on 10/9/2021 show no growth thus far.     Would recommend Decadron, as patient is requiring supplemental oxygen.  Recommend to consider remdesivir even in the setting of end-stage renal disease as benefit may outweigh risk.     For now recommend to continue Levaquin, Flagyl,  micafungin. We will follow closely and adjust antibiotic therapy as appropriate.    Code Status:   Code Status and Medical Interventions:   Ordered at: 10/10/21 0104     Level Of Support Discussed With:    Patient     Code Status:    CPR     Medical Interventions (Level of Support Prior to Arrest):    Full       WISAM Robin  10/14/21  09:53 EDT

## 2021-10-14 NOTE — PROGRESS NOTES
Notified by RNKristi, that patient lost IV access and did not received dose of Levaquin and Flagyl. Additional attempts were made by lead nurse and CCU RN. Midline consult ordered.

## 2021-10-14 NOTE — PROGRESS NOTES
[facility]     Patient Identification:  Name:  Jessica Bravo  Age:  56 y.o.  Sex:  female  :  1965  MRN:  [id number]  Visit Number:  [id number]  Primary Care Provider:  Buck Wallis APRN    Length of stay:  4    Subjective: Patient seen and evaluated through televideo conference assisted by Tonie Villalba RN present in the room.  Patient feeling much better she is breathing better she is eating well no complaints, febrile.    Chief Complaint: Cough and shortness of breath  ----------------------------------------------------------------------------------------------------------------------  Current Hospital Meds:  albuterol sulfate HFA, 2 puff, Inhalation, 4x Daily - RT  amLODIPine, 5 mg, Oral, Q24H  aspirin, 81 mg, Oral, Daily  cholecalciferol, 50,000 Units, Oral, Weekly  dexamethasone, 6 mg, Intravenous, Daily  enoxaparin, 30 mg, Subcutaneous, Daily  fluticasone, 1 spray, Nasal, BID  hydrOXYzine, 25 mg, Oral, Nightly  insulin aspart, 0-14 Units, Subcutaneous, TID AC  insulin detemir, 30 Units, Subcutaneous, Q12H  levoFLOXacin, 500 mg, Intravenous, Q48H  metroNIDAZOLE, 500 mg, Intravenous, Q8H  micafungin (MYCAMINE) IV, 100 mg, Intravenous, Q24H  montelukast, 10 mg, Oral, Nightly  pantoprazole, 40 mg, Oral, Q AM  pravastatin, 10 mg, Oral, Daily  sodium chloride, 3 mL, Intravenous, Q12H         ----------------------------------------------------------------------------------------------------------------------  Vital Signs:  Temp:  [97.4 °F (36.3 °C)-98.1 °F (36.7 °C)] 97.9 °F (36.6 °C)  Heart Rate:  [70-88] 74  Resp:  [20-22] 20  BP: (130-160)/(70-78) 130/70       Tele: Sinus rhythm 81 bpm      10/09/21  1515 10/10/21  0205   Weight: 74.4 kg (164 lb) 72.6 kg (160 lb)     Body mass index is 27.46 kg/m².    Intake/Output Summary (Last 24 hours) at 10/14/2021 180  Last data filed at 10/14/2021 180  Gross per 24 hour   Intake 1837.57 ml   Output --   Net 1837.57  ml     Diet Regular; Cardiac, Consistent Carbohydrate, Renal  ----------------------------------------------------------------------------------------------------------------------  This physical exam has been personally performed remotely in the unit aided by real-time audio/visual communication tools.  Tonie Villalba RN present at bedside during this exam and assisted during exam. The use of a video visit has been reviewed with the patient and verbal informed consent has been obtained.     Physical Exam:  General: Patient in bed comfortable and awake, she she is not dyspneic.  Pin no acute distress.  Head: Normocephalic, atraumatic  Eyes: EOMI. Conjunctivae and sclerae normal.  Ears: Ears appear intact with no abnormalities noted.   Neck: Trachea midline. No obvious JVD.  Heart: Tele reveals sinus rhythm 81 bpm  Lungs: Respirations appear to be regular, even and unlabored with no signs of respiratory distress. No audible wheezing.  Abdomen: No obvious abdominal distension.  MS: Muscle tone appears normal. No gross deformities.  Extremities: No clubbing, cyanosis or edema noted.  Skin: No visible bleeding, bruising, or rash.  Neurologic: Alert and oriented x3. No gross focal deficits.     ----------------------------------------------------------------------------------------------------------------------  ----------------------------------------------------------------------------------------------------------------------  Results from last 7 days   Lab Units 10/10/21  0448 10/09/21  1522   TROPONIN T ng/mL <0.010 0.016     Results from last 7 days   Lab Units 10/14/21  0518 10/13/21  0522 10/12/21  0442 10/11/21  0648 10/11/21  0648 10/10/21  0448 10/10/21  0448 10/09/21  2004 10/09/21  1522 10/09/21  1522   CRP mg/dL 1.44* 2.94* 6.01*   < > 9.30*  --   --   --    < > 29.13*   LACTATE mmol/L  --   --   --   --   --   --  1.7 2.7*  --  2.8*   WBC 10*3/mm3  --  6.26 5.86  --  9.10   < > 7.55  --    < > 10.15    HEMOGLOBIN g/dL  --  10.3* 9.5*  --  10.3*   < > 10.5*  --    < > 11.8*   HEMATOCRIT %  --  33.2* 30.9*  --  32.8*   < > 33.7*  --    < > 37.7   MCV fL  --  85.8 84.4  --  84.1   < > 84.9  --    < > 84.9   MCHC g/dL  --  31.0* 30.7*  --  31.4*   < > 31.2*  --    < > 31.3*   PLATELETS 10*3/mm3  --  357 347  --  359   < > 326  --    < > 365    < > = values in this interval not displayed.     Results from last 7 days   Lab Units 10/12/21  0353   PH, ARTERIAL pH units 7.449   PO2 ART mm Hg 64.2*   PCO2, ARTERIAL mm Hg 32.7*   HCO3 ART mmol/L 22.7     Results from last 7 days   Lab Units 10/13/21  0522 10/12/21  0442 10/11/21  0648 10/10/21  0448 10/10/21  0448 10/09/21  2218 10/09/21  1522   SODIUM mmol/L 137 133* 129*   < > 127*   < > 128*   POTASSIUM mmol/L 4.5 3.3* 3.9   < > 4.0   < > 3.8   MAGNESIUM mg/dL  --   --  1.8  --  2.1  --  2.1   CHLORIDE mmol/L 107 101 99   < > 92*   < > 87*   CO2 mmol/L 21.5* 20.7* 16.3*   < > 16.6*   < > 19.8*   BUN mg/dL 41* 47* 58*   < > 62*   < > 61*   CREATININE mg/dL 2.30* 2.30* 2.94*   < > 3.18*   < > 3.90*   EGFR IF NONAFRICN AM mL/min/1.73 22* 22* 16*   < > 15*   < > 12*   CALCIUM mg/dL 8.5* 8.5* 8.5*   < > 8.6   < > 8.6   GLUCOSE mg/dL 183* 264* 530*   < > 425*   < > 534*   ALBUMIN g/dL  --   --  2.56*  --  2.77*  --  3.19*   BILIRUBIN mg/dL  --   --  0.3  --  0.4  --  0.5   ALK PHOS U/L  --   --  69  --  74  --  92   AST (SGOT) U/L  --   --  11  --  14  --  16   ALT (SGPT) U/L  --   --  9  --  11  --  13    < > = values in this interval not displayed.   Estimated Creatinine Clearance: 26.1 mL/min (A) (by C-G formula based on SCr of 2.3 mg/dL (H)).    No results found for: AMMONIA      Blood Culture   Date Value Ref Range Status   10/09/2021 No growth at 5 days  Final   10/09/2021 No growth at 5 days  Final     Urine Culture   Date Value Ref Range Status   10/09/2021 25,000 CFU/mL Mixed Nikki Isolated  Final     No results found for: WOUNDCX  No results found for: STOOLCX    I  have personally looked at the labs and they are summarized above.  ----------------------------------------------------------------------------------------------------------------------  Imaging Results (Last 24 Hours)     Procedure Component Value Units Date/Time    US Venous Doppler Lower Extremity Bilateral (duplex) [[id number]] Collected: 10/13/21 2237     Updated: 10/13/21 2239    Narrative:      VENOUS DOPPLER ULTRASOUND, BILATERAL LOWER EXTREMITY, 10/13/2021    HISTORY:   56-year-old female hospital inpatient with COVID-19 pneumonia, shortness of air. D-dimer is elevated. Assess for DVT.    TECHNIQUE:   Venous Doppler ultrasound examination of both legs was performed using grey-scale, spectral Doppler, and color flow Doppler ultrasound imaging. The protocol does not include the greater saphenous vein beyond the saphenofemoral junction or the anterior  tibial vein.    FINDINGS:   The examination is negative.  There is no evidence of deep venous thrombosis from the groin to the lower calf on the right or left. Saphenofemoral junctions are patent.      Impression:      Negative examination.  No evidence of right or left lower extremity DVT.    Signer Name: Paulie Jensen MD   Signed: 10/13/2021 10:37 PM   Workstation Name: [email or web address] [email or web address]    [facility]        ----------------------------------------------------------------------------------------------------------------------  Assessment and Plan:  -Sepsis present admission secondary to pneumonia  -COVID-19 pneumonia concerning with bacterial superinfection  -Acute UTI/cystitis with budding yeast on culture   -Acute kidney injury on CKD stage IV  -4 g proteinuria   -Diabetes type 2 with hyperglycemia improved   -D-dimer elevation with VQ low probability, DVT on venous Doppler     She is improving now maintaining O2 saturation of 95% on 2 L, will further titrate down and see if she tolerates without oxygen supplementation, hopefully home  soon.      Disposition home soon      Brianna Craven MD  10/14/21  18:05 EDT

## 2021-10-14 NOTE — PLAN OF CARE
Goal Outcome Evaluation:  Plan of Care Reviewed With: patient        Progress: no change  Outcome Summary: pt resting, has been c/o of diarrhea, PA ordered stool sample, pt stated feels a lot better than yesterday, will continue to monitor

## 2021-10-15 ENCOUNTER — READMISSION MANAGEMENT (OUTPATIENT)
Dept: CALL CENTER | Facility: HOSPITAL | Age: 56
End: 2021-10-15

## 2021-10-15 VITALS
HEART RATE: 89 BPM | HEIGHT: 64 IN | SYSTOLIC BLOOD PRESSURE: 154 MMHG | TEMPERATURE: 97.4 F | OXYGEN SATURATION: 93 % | DIASTOLIC BLOOD PRESSURE: 74 MMHG | BODY MASS INDEX: 27.31 KG/M2 | RESPIRATION RATE: 20 BRPM | WEIGHT: 160 LBS

## 2021-10-15 LAB
CRP SERPL-MCNC: 0.94 MG/DL (ref 0–0.5)
GLUCOSE BLDC GLUCOMTR-MCNC: 331 MG/DL (ref 70–130)
GLUCOSE BLDC GLUCOMTR-MCNC: 334 MG/DL (ref 70–130)

## 2021-10-15 PROCEDURE — 25010000002 DEXAMETHASONE PER 1 MG: Performed by: HOSPITALIST

## 2021-10-15 PROCEDURE — 63710000001 INSULIN ASPART PER 5 UNITS: Performed by: HOSPITALIST

## 2021-10-15 PROCEDURE — 99239 HOSP IP/OBS DSCHRG MGMT >30: CPT | Performed by: HOSPITALIST

## 2021-10-15 PROCEDURE — 94799 UNLISTED PULMONARY SVC/PX: CPT

## 2021-10-15 PROCEDURE — 82962 GLUCOSE BLOOD TEST: CPT

## 2021-10-15 PROCEDURE — 63710000001 INSULIN DETEMIR PER 5 UNITS: Performed by: HOSPITALIST

## 2021-10-15 PROCEDURE — 25010000002 ENOXAPARIN PER 10 MG: Performed by: INTERNAL MEDICINE

## 2021-10-15 PROCEDURE — 86140 C-REACTIVE PROTEIN: CPT | Performed by: PHYSICIAN ASSISTANT

## 2021-10-15 RX ORDER — METRONIDAZOLE 500 MG/1
500 TABLET ORAL 3 TIMES DAILY
Qty: 15 TABLET | Refills: 0 | Status: SHIPPED | OUTPATIENT
Start: 2021-10-15 | End: 2021-10-20

## 2021-10-15 RX ORDER — LEVOFLOXACIN 250 MG/1
250 TABLET ORAL DAILY
Qty: 5 TABLET | Refills: 0 | Status: SHIPPED | OUTPATIENT
Start: 2021-10-15 | End: 2021-10-20

## 2021-10-15 RX ORDER — LEVOFLOXACIN 500 MG/1
500 TABLET, FILM COATED ORAL DAILY
Qty: 5 TABLET | Refills: 0 | Status: SHIPPED | OUTPATIENT
Start: 2021-10-15 | End: 2021-10-15

## 2021-10-15 RX ORDER — PANTOPRAZOLE SODIUM 40 MG/1
40 TABLET, DELAYED RELEASE ORAL DAILY
Qty: 30 TABLET | Refills: 0 | Status: SHIPPED | OUTPATIENT
Start: 2021-10-15

## 2021-10-15 RX ORDER — AMLODIPINE BESYLATE 5 MG/1
5 TABLET ORAL
Qty: 30 TABLET | Refills: 3 | Status: SHIPPED | OUTPATIENT
Start: 2021-10-16

## 2021-10-15 RX ADMIN — AMLODIPINE BESYLATE 5 MG: 5 TABLET ORAL at 08:31

## 2021-10-15 RX ADMIN — ALBUTEROL SULFATE 2 PUFF: 90 AEROSOL, METERED RESPIRATORY (INHALATION) at 06:40

## 2021-10-15 RX ADMIN — DEXAMETHASONE SODIUM PHOSPHATE 6 MG: 4 INJECTION, SOLUTION INTRA-ARTICULAR; INTRALESIONAL; INTRAMUSCULAR; INTRAVENOUS; SOFT TISSUE at 08:31

## 2021-10-15 RX ADMIN — METRONIDAZOLE 500 MG: 500 INJECTION, SOLUTION INTRAVENOUS at 03:16

## 2021-10-15 RX ADMIN — METRONIDAZOLE 500 MG: 500 INJECTION, SOLUTION INTRAVENOUS at 10:45

## 2021-10-15 RX ADMIN — ALBUTEROL SULFATE 2 PUFF: 90 AEROSOL, METERED RESPIRATORY (INHALATION) at 02:12

## 2021-10-15 RX ADMIN — FLUTICASONE PROPIONATE 1 SPRAY: 50 SPRAY, METERED NASAL at 08:32

## 2021-10-15 RX ADMIN — ENOXAPARIN SODIUM 30 MG: 30 INJECTION SUBCUTANEOUS at 08:31

## 2021-10-15 RX ADMIN — INSULIN DETEMIR 30 UNITS: 100 INJECTION, SOLUTION SUBCUTANEOUS at 08:32

## 2021-10-15 RX ADMIN — INSULIN ASPART 10 UNITS: 100 INJECTION, SOLUTION INTRAVENOUS; SUBCUTANEOUS at 10:45

## 2021-10-15 RX ADMIN — PANTOPRAZOLE SODIUM 40 MG: 40 TABLET, DELAYED RELEASE ORAL at 05:19

## 2021-10-15 RX ADMIN — INSULIN ASPART 10 UNITS: 100 INJECTION, SOLUTION INTRAVENOUS; SUBCUTANEOUS at 08:31

## 2021-10-15 RX ADMIN — SODIUM CHLORIDE, PRESERVATIVE FREE 3 ML: 5 INJECTION INTRAVENOUS at 08:31

## 2021-10-15 RX ADMIN — ASPIRIN 81 MG: 81 TABLET, COATED ORAL at 08:31

## 2021-10-15 RX ADMIN — ALBUTEROL SULFATE 2 PUFF: 90 AEROSOL, METERED RESPIRATORY (INHALATION) at 14:13

## 2021-10-15 NOTE — CASE MANAGEMENT/SOCIAL WORK
Discharge Planning Assessment   Christiano     Patient Name: Jessica Bravo  MRN: [id number]  Today's Date: 10/15/2021    Admit Date: 10/9/2021     Discharge Needs Assessment    No documentation.                Discharge Plan     Row Name 10/15/21 1258       Plan    Plan Pt was admitted on 10/09/21. Pt lives with her significant other, Vaughn and plans to return home at discharge. Pt does not utilize home health services at this time. Pt has a standard walker, cane and glucometer at home. SS to follow.                Gricelda Isidro

## 2021-10-15 NOTE — CASE MANAGEMENT/SOCIAL WORK
Discharge Planning Assessment  CELI Alvarado     Patient Name: Jessica Bravo  MRN: [id number]  Today's Date: 10/15/2021    Admit Date: 10/9/2021     Discharge Needs Assessment    No documentation.                Discharge Plan     Row Name 10/15/21 1509       Plan    Final Discharge Disposition Code 01 - home or self-care    Final Note Pt is being discharged home on this date. Pulse ox ordered, to be provided by respiratory. No other needs identified.    Row Name 10/15/21 1258                         Gricelda Isidro

## 2021-10-15 NOTE — PLAN OF CARE
Goal Outcome Evaluation:  Plan of Care Reviewed With: patient        Progress: no change  Outcome Summary: pt resting in bed, no c/o pain/discomfort, no changes, will continue to monitor

## 2021-10-15 NOTE — DISCHARGE SUMMARY
"    [facility] HOSPITALIST MEDICINE DISCHARGE SUMMARY    Patient Identification:  Name:  Jessica Bravo  Age:  56 y.o.  Sex:  female  :  1965  MRN:  [id number]  Visit Number:  [id number]    Date of Admission: 10/9/2021  Date of Discharge: 10/15 2021  DISCHARGE DISPOSITION   Stable  PCP: Buck Wallis, WISAM    DISCHARGE DIAGNOSIS : Sepsis present on admission secondary to COVID-19 pneumonia, acute kidney injury on CKD stage IV, nephrotic range proteinuria with 4 g 24-hour urine, hyponatremia improved, diabetes type 2 insulin requiring, metabolic acidosis, history of 1 kidney, lactic acidosis, acute hypokalemia, essential hypertension, D-dimer elevation without PE or DVT on work-up.  Acute cystitis without hematuria, yeast on culture.    HOSPITAL COURSE  Patient is a 56 y.o. female presented to [facility] complaining of increasing shortness of breath coughing and dyspnea, she has history of recent admission for day at [facility] for COVID-19 pneumonia and was discharged.  Due to persistence and worsening of shortness of breath, patient was seen at our facility, she was hypoxic, D-dimer was elevated, she also is acute kidney injury on CKD stage IV she has hyponatremia.  Due to hypoxic respiratory failure she was admitted.  Her oxygen requirements peaked at 6 L nasal cannula to maintain O2 saturation above 90%.  Nephrology was consulted with management \"withholding nephrotoxic medication, diuretics, hydration.  Her renal function improved back to baseline, diabetes was addressed with Accu-Chek and sliding scale, adjustments of her insulin regimen.  For the COVID-19 pneumonia infectious disease was consulted, x-ray and CT scan of the chest shows diffuse infiltrates with air bronchograms, procalcitonin level was elevated, because of her history of nausea and vomiting, she was treated for superimposed bacterial pneumonia/possible aspiration.  Patient did not receive Remdesevir due to " renal failure, she was on Decadron.  UA also grew yeast hence she was started on micafungin.  Her D-dimer peaked at 3.09.  Lovenox was suggested per [place] protocol.  For the past 3 days patient continued to improve, requiring less oxygen supplementation, yesterday evening she was off oxygen and was tolerating well with O2 saturation above 83%.  Today on room air she was ambulating at 95% it did drop to 88 briefly but went up immediately.  Plan is to discharge home today, she will continue with Eliquis 5 mg twice daily for total of 28 days per [place] protocol.  Patient was counseled the potential side effects of Eliquis including bleeding, should she have any symptoms of bleed including hematemesis, hematuria, hemoptysis melena hematochezia to stop the medication and call for medical help immediately.  She will follow up with her primary nephrologist Dr. Mitchell in 2 weeks time for posthospitalization follow-up, appointment with her primary provider in 1 week's time      VITAL SIGNS:      10/09/21  1515 10/10/21  0205   Weight: 74.4 kg (164 lb) 72.6 kg (160 lb)     Body mass index is 27.46 kg/m².  Vitals:    10/15/21 1413   BP:    Pulse: 89   Resp: 20   Temp:    SpO2: 93%     PHYSICAL EXAM:  General: Chronically ill-appearing, comfortable,awake, alert, oriented to self, place, and time, well-developed and well-nourished.  No respiratory distress.    Skin:  Skin is warm and dry. No rash noted. No pallor.    HENT:  Head:  Normocephalic and atraumatic.  Mouth:  Moist mucous membranes.    Eyes:  Conjunctivae and EOM are normal.  Pupils are equal, round, and reactive to light.  No scleral icterus.    Neck:  Neck supple.  No JVD present.    No bruit  Pulmonary/Chest:  No respiratory distress, no wheezes, no crackles, with normal breath sounds and good air movement.  Cardiovascular:  Normal rate, regular rhythm and normal heart sounds with no murmur.  Abdominal:  Soft.  Bowel sounds are normal.  No distension and no  tenderness.   Extremities:  No edema, no tenderness, and no deformity.  No red or swollen joints anywhere.  Strong pulses in all 4 extremities with no clubbing, no cyanosis, no edema.  Neurological:  Motor strength equal no obvious deficit, sensory grossly intact.   No cranial nerve deficit.  No tongue deviation.  No facial droop.  No slurred speech.    Genitourinary: No Medina catheter  Back:  ----------    DISCHARGE MEDICATIONS:     Discharge Medications      New Medications      Instructions Start Date   amLODIPine 5 MG tablet  Commonly known as: NORVASC   5 mg, Oral, Every 24 Hours Scheduled   Start Date: October 16, 2021     Eliquis 5 MG tablet tablet  Generic drug: apixaban   5 mg, Oral, Every 12 Hours Scheduled      levoFLOXacin 250 MG tablet  Commonly known as: Levaquin  Notes to patient: Start taking on 10/16/21   250 mg, Oral, Daily      metroNIDAZOLE 500 MG tablet  Commonly known as: Flagyl   500 mg, Oral, 3 Times Daily      pantoprazole 40 MG EC tablet  Commonly known as: Protonix   40 mg, Oral, Daily         Continue These Medications      Instructions Start Date   albuterol sulfate  (90 Base) MCG/ACT inhaler  Commonly known as: PROVENTIL HFA;VENTOLIN HFA;PROAIR HFA   2 puffs, Inhalation, Every 4 Hours PRN      aspirin 81 MG tablet   81 mg, Oral, Daily      famotidine 20 MG tablet  Commonly known as: PEPCID   20 mg, Oral, Nightly PRN      fluticasone 50 MCG/ACT nasal spray  Commonly known as: FLONASE   1 spray, Nasal, 2 times daily      halobetasol 0.05 % cream  Commonly known as: ULTRAVATE   1 application, Topical, 2 Times Daily PRN      hydrOXYzine 25 MG tablet  Commonly known as: ATARAX   25 mg, Oral, Nightly      insulin aspart 100 UNIT/ML injection  Commonly known as: novoLOG   0-12 Units, Subcutaneous, 3 Times Daily Before Meals, Prior to [facility] Admission, Patient was on: max daily dose 36 units total      insulin regular 100 UNIT/ML injection  Commonly known as: humuLIN R,novoLIN R   6  Units, Subcutaneous, 2 Times Daily With Meals, Prior to [facility] Admission, Patient was on: 6 units at breakfast and dinner, and 10 units at lunch.      insulin regular 100 UNIT/ML injection  Commonly known as: humuLIN R,novoLIN R   10 Units, Subcutaneous, Daily With Lunch, Prior to [facility] Admission, Patient was on: 6 units at breakfast and dinner, and 10 units at lunch.      linaclotide 145 MCG capsule capsule  Commonly known as: LINZESS   145 mcg, Oral, Every Morning Before Breakfast      montelukast 10 MG tablet  Commonly known as: SINGULAIR   10 mg, Oral, Nightly      pravastatin 10 MG tablet  Commonly known as: PRAVACHOL   10 mg, Oral, Daily      vitamin D 1.25 MG (38157 UT) capsule capsule  Commonly known as: ERGOCALCIFEROL   50,000 Units, Oral, Weekly         Stop These Medications    dicyclomine 20 MG tablet  Commonly known as: BENTYL     furosemide 80 MG tablet  Commonly known as: LASIX     gabapentin 100 MG capsule  Commonly known as: NEURONTIN     lisinopril 5 MG tablet  Commonly known as: PRINIVIL,ZESTRIL     ondansetron ODT 4 MG disintegrating tablet  Commonly known as: ZOFRAN-ODT     sodium chloride 0.65 % nasal spray              Your Scheduled Appointments    Follow-up with PCP, Buck JOEL on 11/4/21 at 10:45 am. Office number is [phone]           Additional Instructions for the Follow-ups that You Need to Schedule     Discharge Follow-up with PCP   As directed       Currently Documented PCP:    Buck Wallis APRN    PCP Phone Number:    [phone]     Follow Up Details: WISAM Montesinos (1 week)         Discharge Follow-up with Specified Provider: Dr. Araiza (Nephrology); 2 Weeks   As directed      To: Dr. Araiza (Nephrology)    Follow Up: 2 Weeks            Follow-up Information     Buck Wallis APRN .    Specialty: Family Medicine  Why: WISAM Montesinos (1 week)  Contact information:  [address] [place] [id number]  [phone]             Aaron Ferguson  WISAM Soto .    Specialty: Nurse Practitioner  Why: WISAM Montesinos (1 week)  Contact information:  [address] [place], [place] [id number] [phone]                         Brianna Craven MD  10/15/21  17:33 EDT    Please note that this discharge summary required more than 30 minutes to complete.

## 2021-10-15 NOTE — PROGRESS NOTES
PROGRESS NOTE         Patient Identification:  Name:  Jessica Bravo  Age:  56 y.o.  Sex:  female  :  1965  MRN:  [id number]  Visit Number:  [id number]  Primary Care Provider:  Buck Wallis APRN         LOS: 5 days       ----------------------------------------------------------------------------------------------------------------------  Subjective       Chief Complaints:    Hyperglycemia, Vomiting, and Nausea        Interval History:      Patient on room air this morning no apparent distress.  Afebrile, no diarrhea.  CRP improving at 0.94.    ----------------------------------------------------------------------------------------------------------------------      Objective       Current Hospital Meds:  albuterol sulfate HFA, 2 puff, Inhalation, 4x Daily - RT  amLODIPine, 5 mg, Oral, Q24H  aspirin, 81 mg, Oral, Daily  cholecalciferol, 50,000 Units, Oral, Weekly  dexamethasone, 6 mg, Intravenous, Daily  enoxaparin, 30 mg, Subcutaneous, Daily  fluticasone, 1 spray, Nasal, BID  hydrOXYzine, 25 mg, Oral, Nightly  insulin aspart, 0-14 Units, Subcutaneous, TID AC  insulin detemir, 30 Units, Subcutaneous, Q12H  levoFLOXacin, 500 mg, Intravenous, Q48H  metroNIDAZOLE, 500 mg, Intravenous, Q8H  micafungin (MYCAMINE) IV, 100 mg, Intravenous, Q24H  montelukast, 10 mg, Oral, Nightly  pantoprazole, 40 mg, Oral, Q AM  pravastatin, 10 mg, Oral, Daily  sodium chloride, 3 mL, Intravenous, Q12H         ----------------------------------------------------------------------------------------------------------------------    Vital Signs:  Temp:  [97.4 °F (36.3 °C)-98.4 °F (36.9 °C)] 97.4 °F (36.3 °C)  Heart Rate:  [70-93] 74  Resp:  [18-20] 20  BP: (122-154)/(55-76) 154/74  No data found.  SpO2 Percentage    10/15/21 0835 10/15/21 0837 10/15/21 0840   SpO2: 95% (!) 86%  Comment: ambulation on room air 91%     SpO2:  [86 %-96 %] 91 %  on  Flow (L/min):  [2] 2;   Device (Oxygen Therapy): room air    Body mass index  is 27.46 kg/m².  Wt Readings from Last 3 Encounters:   10/10/21 72.6 kg (160 lb)   02/05/20 78 kg (172 lb)   10/18/18 79.2 kg (174 lb 9.6 oz)        Intake/Output Summary (Last 24 hours) at 10/15/2021 1011  Last data filed at 10/15/2021 0519  Gross per 24 hour   Intake 1604.36 ml   Output --   Net 1604.36 ml     Diet Regular; Cardiac, Consistent Carbohydrate, Renal  ----------------------------------------------------------------------------------------------------------------------      Physical Exam:    Deferred due to COVID-19 isolation.  ----------------------------------------------------------------------------------------------------------------------  Results from last 7 days   Lab Units 10/10/21  0448 10/09/21  1522   TROPONIN T ng/mL <0.010 0.016     Results from last 7 days   Lab Units 10/09/21  1522   PROBNP pg/mL 581.1       Results from last 7 days   Lab Units 10/12/21  0353   PH, ARTERIAL pH units 7.449   PO2 ART mm Hg 64.2*   PCO2, ARTERIAL mm Hg 32.7*   HCO3 ART mmol/L 22.7     Results from last 7 days   Lab Units 10/15/21  0508 10/14/21  0518 10/13/21  0522 10/12/21  0442 10/12/21  0442 10/11/21  0648 10/11/21  0648 10/10/21  0448 10/10/21  0448 10/09/21  2004 10/09/21  1522 10/09/21  1522   CRP mg/dL 0.94* 1.44* 2.94*   < > 6.01*   < > 9.30*  --   --   --    < > 29.13*   LACTATE mmol/L  --   --   --   --   --   --   --   --  1.7 2.7*  --  2.8*   WBC 10*3/mm3  --   --  6.26  --  5.86  --  9.10   < > 7.55  --    < > 10.15   HEMOGLOBIN g/dL  --   --  10.3*  --  9.5*  --  10.3*   < > 10.5*  --    < > 11.8*   HEMATOCRIT %  --   --  33.2*  --  30.9*  --  32.8*   < > 33.7*  --    < > 37.7   MCV fL  --   --  85.8  --  84.4  --  84.1   < > 84.9  --    < > 84.9   MCHC g/dL  --   --  31.0*  --  30.7*  --  31.4*   < > 31.2*  --    < > 31.3*   PLATELETS 10*3/mm3  --   --  357  --  347  --  359   < > 326  --    < > 365    < > = values in this interval not displayed.     Results from last 7 days   Lab Units  10/13/21  0522 10/12/21  0442 10/11/21  0648 10/10/21  0448 10/10/21  0448 10/09/21  2218 10/09/21  1522   SODIUM mmol/L 137 133* 129*   < > 127*   < > 128*   POTASSIUM mmol/L 4.5 3.3* 3.9   < > 4.0   < > 3.8   MAGNESIUM mg/dL  --   --  1.8  --  2.1  --  2.1   CHLORIDE mmol/L 107 101 99   < > 92*   < > 87*   CO2 mmol/L 21.5* 20.7* 16.3*   < > 16.6*   < > 19.8*   BUN mg/dL 41* 47* 58*   < > 62*   < > 61*   CREATININE mg/dL 2.30* 2.30* 2.94*   < > 3.18*   < > 3.90*   EGFR IF NONAFRICN AM mL/min/1.73 22* 22* 16*   < > 15*   < > 12*   CALCIUM mg/dL 8.5* 8.5* 8.5*   < > 8.6   < > 8.6   GLUCOSE mg/dL 183* 264* 530*   < > 425*   < > 534*   ALBUMIN g/dL  --   --  2.56*  --  2.77*  --  3.19*   BILIRUBIN mg/dL  --   --  0.3  --  0.4  --  0.5   ALK PHOS U/L  --   --  69  --  74  --  92   AST (SGOT) U/L  --   --  11  --  14  --  16   ALT (SGPT) U/L  --   --  9  --  11  --  13    < > = values in this interval not displayed.   Estimated Creatinine Clearance: 26.1 mL/min (A) (by C-G formula based on SCr of 2.3 mg/dL (H)).  No results found for: AMMONIA    Glucose   Date/Time Value Ref Range Status   10/15/2021 0700 331 (H) 70 - 130 mg/dL Final     Comment:     Meter: [id number] : [id number] rosalesjena wallace   10/14/2021 2027 273 (H) 70 - 130 mg/dL Final     Comment:     Meter: [id number] : [id number] [facility]   10/14/2021 1652 202 (H) 70 - 130 mg/dL Final     Comment:     Meter: [id number] : [id number] mimakristin bains   10/14/2021 1105 244 (H) 70 - 130 mg/dL Final     Comment:     Meter: [id number] : [id number] mima nara   10/14/2021 0708 189 (H) 70 - 130 mg/dL Final     Comment:     Meter: [id number] : [id number] rosaels wallace   10/13/2021 2138 252 (H) 70 - 130 mg/dL Final     Comment:     Meter: [id number] : [id number] [facility]   10/13/2021 1641 201 (H) 70 - 130 mg/dL Final     Comment:     Meter: [id number] : [id number] Martin William   10/13/2021 1151 239 (H) 70 - 130 mg/dL  Final     Comment:     Meter: [id number] : [id number] Martin William     Lab Results   Component Value Date    HGBA1C 10.50 (H) 10/09/2021     Lab Results   Component Value Date    TSH 0.397 10/09/2021       Blood Culture   Date Value Ref Range Status   10/09/2021 No growth at 24 hours  Preliminary   10/09/2021 No growth at 24 hours  Preliminary     Urine Culture   Date Value Ref Range Status   10/09/2021 25,000 CFU/mL Mixed Bubba Isolated  Final     No results found for: WOUNDCX  No results found for: STOOLCX  No results found for: RESPCX  Pain Management Panel       Pain Management Panel Latest Ref Rng & Units 10/10/2021    CREATININE UR mg/dL 41.4              ----------------------------------------------------------------------------------------------------------------------  Imaging Results (Last 24 Hours)       ** No results found for the last 24 hours. **            ----------------------------------------------------------------------------------------------------------------------    Assessment/Plan       Assessment/Plan     ASSESSMENT:    1.  Severe sepsis with lactic acid greater than 2 on admission  2.  COVID-19 pneumonia   3.  Superimposed bacterial pneumonia  4.  UTI    PLAN:    Patient on room air this morning no apparent distress.  Afebrile, no diarrhea.  CRP improving at 0.94.    Urinalysis on 10/9/2021 was positive with 31-50 WBCs, 1+ bacteria, and 3+ budding yeast. Urine culture finalized as 25,000 colonies of mixed bubba. Nuclear medicine lung scan on 10/9/2021 showed low probability of PE on the basis of perfusion only nuclear medicine study.  CT chest without contrast on 10/9/2021 showed extensive patchy groundglass infiltrates scattered throughout both lungs, consistent with multifocal pneumonia.  COVID-19 reporting criteria: Typical.  Commonly reported imaging features of COVID-19 pneumonia are present.  Other processes such as influenza pneumonia and organizing pneumonia can cause a  similar imaging pattern.  Hepatic steatosis.  Multiple prominent mediastinal lymph nodes are likely reactive.  Follow-up recommended.  Chest x-ray on 10/9/2021 showed extensive bilateral patchy airspace disease.  Commonly reported imaging features of COVID-19 pneumonia are present.  Other processes such as influenza pneumonia and organizing pneumonia can cause a similar imaging pattern.  COVID-19 and flu A/B PCR on 10/9/2021 was positive.  Mycoplasma pneumonia antibody on 10/10/2021 was negative.  Strep pneumo antigen on 10/10/2021 negative.  Blood cultures on 10/9/2021 show no growth thus far.     Would recommend Decadron, as patient is requiring supplemental oxygen.  Recommend to consider remdesivir even in the setting of end-stage renal disease as benefit may outweigh risk.     Patient completed sufficient course of micafungin.  While hospitalized recommend to continue IV Levaquin and Flagyl that can be deescalated to p.o. Levaquin and Flagyl upon discharge to continue through 10/20/2021.   Patient stable from ID standpoint.      Code Status:   Code Status and Medical Interventions:   Ordered at: 10/10/21 0104     Level Of Support Discussed With:    Patient     Code Status:    CPR     Medical Interventions (Level of Support Prior to Arrest):    Full       WISAM Robin  10/15/21  10:11 EDT

## 2021-10-16 ENCOUNTER — DOCUMENTATION (OUTPATIENT)
Dept: INTERNAL MEDICINE | Facility: HOSPITAL | Age: 56
End: 2021-10-16

## 2021-10-16 ENCOUNTER — READMISSION MANAGEMENT (OUTPATIENT)
Dept: CALL CENTER | Facility: HOSPITAL | Age: 56
End: 2021-10-16

## 2021-10-16 NOTE — PAYOR COMM NOTE
"[facility]  JULIO CÉSAR HAMMOND  PHONE  [phone]  FAX  [phone]  NPI:  [id number]    PATIENT D/C 10/15/2021    Jessica Hamm (56 y.o. Female)             Date of Birth Social Security Number Address Home Phone MRN    1965  [address] [facility] [address] [phone] [id number]    Scientologist Marital Status             None Single       Admission Date Admission Type Admitting Provider Attending Provider Department, Room/Bed    10/9/21 Emergency   [address], 3327/1P    Discharge Date Discharge Disposition Discharge Destination          10/15/2021 Home or Self Care              Attending Provider: (none)   Allergies: Bactrim [Sulfamethoxazole-trimethoprim], Sulfa Antibiotics, Benadryl [Diphenhydramine Hcl (Sleep)], Penicillins    Isolation: None   Infection: COVID (confirmed) (10/09/21)   Code Status: Prior   Advance Care Planning Activity    Ht: 162.6 cm (64\")   Wt: 72.6 kg (160 lb)    Admission Cmt: None   Principal Problem: None                Active Insurance as of 10/9/2021     Primary Coverage     Payor Plan Insurance Group Employer/Plan Group    MEDICARE MEDICARE A & B      Payor Plan Address Payor Plan Phone Number Payor Plan Fax Number Effective Dates    [address] [phone]  2/1/2016 - None Entered    [facility] [id number]       Subscriber Name Subscriber Birth Date Member ID       JESSICA HAMM 1965 [id number]           Secondary Coverage     Payor Plan Insurance Group Employer/Plan Group    [employer] [place] WE[employer]ARE [place]ICAID      Payor Plan Address Payor Plan Phone Number Payor Plan Fax Number Effective Dates    [address] [address] [phone]  3/9/2017 - None Entered    [facility] [id number]       Subscriber Name Subscriber Birth Date Member ID       JESSICA HAMM 1965 [id number]                 Emergency Contacts      (Rel.) Home Phone Work Phone Mobile Phone    [place] (Sister) [phone] -- --    MIRTA HAMM (Son) [phone] -- --    "

## 2021-10-16 NOTE — OUTREACH NOTE
COVID-19 Week 1 Survey      Responses   [facility] patient discharged from? [facility]   Does the patient have one of the following disease processes/diagnoses(primary or secondary)? COVID-19   COVID-19 underlying condition? Sepsis   Call Number Call 1   Week 1 Call successful? No   Discharge diagnosis Sepsis present on admission secondary to COVID-19 pneumonia          Andreia Bonner LPN

## 2021-10-16 NOTE — OUTREACH NOTE
Prep Survey      Responses   [facility] facility patient discharged from? [place]   Is LACE score < 7 ? No   Emergency Room discharge w/ pulse ox? No   Eligibility Readm Mgmt   Discharge diagnosis Sepsis present on admission secondary to COVID-19 pneumonia   Does the patient have one of the following disease processes/diagnoses(primary or secondary)? COVID-19   Does the patient have Home health ordered? No   Is there a DME ordered? No   Prep survey completed? Yes          Maru Garnett RN

## 2021-10-17 ENCOUNTER — READMISSION MANAGEMENT (OUTPATIENT)
Dept: CALL CENTER | Facility: HOSPITAL | Age: 56
End: 2021-10-17

## 2021-10-17 NOTE — OUTREACH NOTE
COVID-19 Week 1 Survey      Responses   [facility] patient discharged from? [facility]   Does the patient have one of the following disease processes/diagnoses(primary or secondary)? COVID-19   COVID-19 underlying condition? Sepsis   Call Number Call 2   Week 1 Call successful? No   Discharge diagnosis Sepsis present on admission secondary to COVID-19 pneumonia          Gricelda Gaming RN

## 2021-10-18 ENCOUNTER — READMISSION MANAGEMENT (OUTPATIENT)
Dept: CALL CENTER | Facility: HOSPITAL | Age: 56
End: 2021-10-18

## 2021-10-18 ENCOUNTER — TELEPHONE (OUTPATIENT)
Dept: MEDSURG UNIT | Facility: HOSPITAL | Age: 56
End: 2021-10-18

## 2021-10-18 NOTE — OUTREACH NOTE
COVID-19 Week 1 Survey      Responses   [facility] patient discharged from? [facility]   Does the patient have one of the following disease processes/diagnoses(primary or secondary)? COVID-19   COVID-19 underlying condition? Sepsis   Call Number Call 3   Week 1 Call successful? No   Discharge diagnosis Sepsis present on admission secondary to COVID-19 pneumonia          Daniela Danielle RN

## 2021-10-30 ENCOUNTER — APPOINTMENT (OUTPATIENT)
Dept: NUCLEAR MEDICINE | Facility: HOSPITAL | Age: 56
End: 2021-10-30

## 2021-10-30 ENCOUNTER — APPOINTMENT (OUTPATIENT)
Dept: CT IMAGING | Facility: HOSPITAL | Age: 56
End: 2021-10-30

## 2021-10-30 ENCOUNTER — APPOINTMENT (OUTPATIENT)
Dept: GENERAL RADIOLOGY | Facility: HOSPITAL | Age: 56
End: 2021-10-30

## 2021-10-30 ENCOUNTER — HOSPITAL ENCOUNTER (INPATIENT)
Facility: HOSPITAL | Age: 56
LOS: 7 days | Discharge: HOME | End: 2021-11-06
Attending: STUDENT IN AN ORGANIZED HEALTH CARE EDUCATION/TRAINING PROGRAM | Admitting: INTERNAL MEDICINE

## 2021-10-30 DIAGNOSIS — N17.9 AKI (ACUTE KIDNEY INJURY) (HCC): ICD-10-CM

## 2021-10-30 DIAGNOSIS — J18.9 PNEUMONIA OF BOTH LUNGS DUE TO INFECTIOUS ORGANISM, UNSPECIFIED PART OF LUNG: ICD-10-CM

## 2021-10-30 DIAGNOSIS — J84.116 CRYPTOGENIC ORGANIZING PNEUMONIA (HCC): ICD-10-CM

## 2021-10-30 DIAGNOSIS — J96.01 ACUTE RESPIRATORY FAILURE WITH HYPOXIA (HCC): Primary | ICD-10-CM

## 2021-10-30 DIAGNOSIS — J18.9 PNEUMONIA OF BOTH LOWER LOBES DUE TO INFECTIOUS ORGANISM: ICD-10-CM

## 2021-10-30 LAB
A-A DO2: 51.4 MMHG (ref 0–300)
ALBUMIN SERPL-MCNC: 3.16 G/DL (ref 3.5–5.2)
ALBUMIN/GLOB SERPL: 0.9 G/DL
ALP SERPL-CCNC: 104 U/L (ref 39–117)
ALT SERPL W P-5'-P-CCNC: 9 U/L (ref 1–33)
ANION GAP SERPL CALCULATED.3IONS-SCNC: 12.8 MMOL/L (ref 5–15)
APTT PPP: 33.2 SECONDS (ref 25.5–35.4)
ARTERIAL PATENCY WRIST A: ABNORMAL
AST SERPL-CCNC: 7 U/L (ref 1–32)
ATMOSPHERIC PRESS: 715 MMHG
B PARAPERT DNA SPEC QL NAA+PROBE: NOT DETECTED
B PERT DNA SPEC QL NAA+PROBE: NOT DETECTED
BASE EXCESS BLDA CALC-SCNC: -3.9 MMOL/L (ref 0–2)
BASOPHILS # BLD AUTO: 0.02 10*3/MM3 (ref 0–0.2)
BASOPHILS NFR BLD AUTO: 0.2 % (ref 0–1.5)
BDY SITE: ABNORMAL
BILIRUB SERPL-MCNC: 0.3 MG/DL (ref 0–1.2)
BODY TEMPERATURE: 0 C
BUN SERPL-MCNC: 32 MG/DL (ref 6–20)
BUN/CREAT SERPL: 9.8 (ref 7–25)
C PNEUM DNA NPH QL NAA+NON-PROBE: NOT DETECTED
CALCIUM SPEC-SCNC: 8.1 MG/DL (ref 8.6–10.5)
CHLORIDE SERPL-SCNC: 102 MMOL/L (ref 98–107)
CO2 BLDA-SCNC: 22.3 MMOL/L (ref 22–33)
CO2 SERPL-SCNC: 20.2 MMOL/L (ref 22–29)
COHGB MFR BLD: 2.5 % (ref 0–5)
CREAT SERPL-MCNC: 3.28 MG/DL (ref 0.57–1)
CRP SERPL-MCNC: 3.39 MG/DL (ref 0–0.5)
D DIMER PPP FEU-MCNC: 0.9 MCGFEU/ML (ref 0–0.5)
D-LACTATE SERPL-SCNC: 2.1 MMOL/L (ref 0.5–2)
D-LACTATE SERPL-SCNC: 2.4 MMOL/L (ref 0.5–2)
DEPRECATED RDW RBC AUTO: 55.2 FL (ref 37–54)
EOSINOPHIL # BLD AUTO: 0.15 10*3/MM3 (ref 0–0.4)
EOSINOPHIL NFR BLD AUTO: 1.8 % (ref 0.3–6.2)
ERYTHROCYTE [DISTWIDTH] IN BLOOD BY AUTOMATED COUNT: 16.7 % (ref 12.3–15.4)
FLUAV SUBTYP SPEC NAA+PROBE: NOT DETECTED
FLUBV RNA ISLT QL NAA+PROBE: NOT DETECTED
GFR SERPL CREATININE-BSD FRML MDRD: 14 ML/MIN/1.73
GFR SERPL CREATININE-BSD FRML MDRD: ABNORMAL ML/MIN/{1.73_M2}
GLOBULIN UR ELPH-MCNC: 3.6 GM/DL
GLUCOSE BLDC GLUCOMTR-MCNC: 111 MG/DL (ref 70–130)
GLUCOSE BLDC GLUCOMTR-MCNC: 353 MG/DL (ref 70–130)
GLUCOSE BLDC GLUCOMTR-MCNC: 510 MG/DL (ref 70–130)
GLUCOSE SERPL-MCNC: 468 MG/DL (ref 65–99)
HADV DNA SPEC NAA+PROBE: NOT DETECTED
HCO3 BLDA-SCNC: 21.1 MMOL/L (ref 20–26)
HCOV 229E RNA SPEC QL NAA+PROBE: NOT DETECTED
HCOV HKU1 RNA SPEC QL NAA+PROBE: NOT DETECTED
HCOV NL63 RNA SPEC QL NAA+PROBE: NOT DETECTED
HCOV OC43 RNA SPEC QL NAA+PROBE: NOT DETECTED
HCT VFR BLD AUTO: 34.6 % (ref 34–46.6)
HCT VFR BLD CALC: 33.6 % (ref 38–51)
HGB BLD-MCNC: 10.4 G/DL (ref 12–15.9)
HGB BLDA-MCNC: 11 G/DL (ref 13.5–17.5)
HMPV RNA NPH QL NAA+NON-PROBE: NOT DETECTED
HPIV1 RNA SPEC QL NAA+PROBE: NOT DETECTED
HPIV2 RNA SPEC QL NAA+PROBE: NOT DETECTED
HPIV3 RNA NPH QL NAA+PROBE: NOT DETECTED
HPIV4 P GENE NPH QL NAA+PROBE: NOT DETECTED
IMM GRANULOCYTES # BLD AUTO: 0.06 10*3/MM3 (ref 0–0.05)
IMM GRANULOCYTES NFR BLD AUTO: 0.7 % (ref 0–0.5)
INHALED O2 CONCENTRATION: 21 %
INR PPP: 1.12 (ref 0.9–1.1)
L PNEUMO1 AG UR QL IA: NEGATIVE
LYMPHOCYTES # BLD AUTO: 0.77 10*3/MM3 (ref 0.7–3.1)
LYMPHOCYTES NFR BLD AUTO: 9.1 % (ref 19.6–45.3)
Lab: ABNORMAL
Lab: ABNORMAL
M PNEUMO IGG SER IA-ACNC: NOT DETECTED
MAGNESIUM SERPL-MCNC: 2.1 MG/DL (ref 1.6–2.6)
MCH RBC QN AUTO: 27.2 PG (ref 26.6–33)
MCHC RBC AUTO-ENTMCNC: 30.1 G/DL (ref 31.5–35.7)
MCV RBC AUTO: 90.3 FL (ref 79–97)
METHGB BLD QL: 0.2 % (ref 0–3)
MODALITY: ABNORMAL
MONOCYTES # BLD AUTO: 0.4 10*3/MM3 (ref 0.1–0.9)
MONOCYTES NFR BLD AUTO: 4.7 % (ref 5–12)
MRSA DNA SPEC QL NAA+PROBE: NEGATIVE
NEUTROPHILS NFR BLD AUTO: 7.08 10*3/MM3 (ref 1.7–7)
NEUTROPHILS NFR BLD AUTO: 83.5 % (ref 42.7–76)
NOTE: ABNORMAL
NOTIFIED BY: ABNORMAL
NOTIFIED WHO: ABNORMAL
NRBC BLD AUTO-RTO: 0 /100 WBC (ref 0–0.2)
NT-PROBNP SERPL-MCNC: 714.7 PG/ML (ref 0–900)
OXYHGB MFR BLDV: 80.3 % (ref 94–99)
PCO2 BLDA: 37.5 MM HG (ref 35–45)
PCO2 TEMP ADJ BLD: ABNORMAL MM[HG]
PH BLDA: 7.36 PH UNITS (ref 7.35–7.45)
PH, TEMP CORRECTED: ABNORMAL
PLATELET # BLD AUTO: 245 10*3/MM3 (ref 140–450)
PMV BLD AUTO: 9.8 FL (ref 6–12)
PO2 BLDA: 46.6 MM HG (ref 83–108)
PO2 TEMP ADJ BLD: ABNORMAL MM[HG]
POTASSIUM SERPL-SCNC: 5 MMOL/L (ref 3.5–5.2)
PROT SERPL-MCNC: 6.8 G/DL (ref 6–8.5)
PROTHROMBIN TIME: 14.8 SECONDS (ref 12.8–14.5)
QT INTERVAL: 374 MS
QTC INTERVAL: 479 MS
RBC # BLD AUTO: 3.83 10*6/MM3 (ref 3.77–5.28)
RHINOVIRUS RNA SPEC NAA+PROBE: NOT DETECTED
RSV RNA NPH QL NAA+NON-PROBE: NOT DETECTED
S AUREUS DNA SPEC QL NAA+PROBE: NEGATIVE
SAO2 % BLDCOA: 82.5 % (ref 94–99)
SODIUM SERPL-SCNC: 135 MMOL/L (ref 136–145)
TROPONIN T SERPL-MCNC: <0.01 NG/ML (ref 0–0.03)
VENTILATOR MODE: ABNORMAL
WBC # BLD AUTO: 8.48 10*3/MM3 (ref 3.4–10.8)

## 2021-10-30 PROCEDURE — 87633 RESP VIRUS 12-25 TARGETS: CPT | Performed by: PHYSICIAN ASSISTANT

## 2021-10-30 PROCEDURE — 93005 ELECTROCARDIOGRAM TRACING: CPT | Performed by: STUDENT IN AN ORGANIZED HEALTH CARE EDUCATION/TRAINING PROGRAM

## 2021-10-30 PROCEDURE — 87899 AGENT NOS ASSAY W/OPTIC: CPT | Performed by: PHYSICIAN ASSISTANT

## 2021-10-30 PROCEDURE — 25010000002 CEFEPIME PER 500 MG: Performed by: STUDENT IN AN ORGANIZED HEALTH CARE EDUCATION/TRAINING PROGRAM

## 2021-10-30 PROCEDURE — 71250 CT THORAX DX C-: CPT

## 2021-10-30 PROCEDURE — 82805 BLOOD GASES W/O2 SATURATION: CPT

## 2021-10-30 PROCEDURE — 82962 GLUCOSE BLOOD TEST: CPT

## 2021-10-30 PROCEDURE — 78580 LUNG PERFUSION IMAGING: CPT

## 2021-10-30 PROCEDURE — 86140 C-REACTIVE PROTEIN: CPT | Performed by: STUDENT IN AN ORGANIZED HEALTH CARE EDUCATION/TRAINING PROGRAM

## 2021-10-30 PROCEDURE — 63710000001 INSULIN ASPART PER 5 UNITS: Performed by: HOSPITALIST

## 2021-10-30 PROCEDURE — 94799 UNLISTED PULMONARY SVC/PX: CPT

## 2021-10-30 PROCEDURE — 93010 ELECTROCARDIOGRAM REPORT: CPT | Performed by: INTERNAL MEDICINE

## 2021-10-30 PROCEDURE — 83050 HGB METHEMOGLOBIN QUAN: CPT

## 2021-10-30 PROCEDURE — 83605 ASSAY OF LACTIC ACID: CPT | Performed by: STUDENT IN AN ORGANIZED HEALTH CARE EDUCATION/TRAINING PROGRAM

## 2021-10-30 PROCEDURE — A9540 TC99M MAA: HCPCS | Performed by: STUDENT IN AN ORGANIZED HEALTH CARE EDUCATION/TRAINING PROGRAM

## 2021-10-30 PROCEDURE — 85730 THROMBOPLASTIN TIME PARTIAL: CPT | Performed by: STUDENT IN AN ORGANIZED HEALTH CARE EDUCATION/TRAINING PROGRAM

## 2021-10-30 PROCEDURE — 25010000002 LINEZOLID 600 MG/300ML SOLUTION: Performed by: STUDENT IN AN ORGANIZED HEALTH CARE EDUCATION/TRAINING PROGRAM

## 2021-10-30 PROCEDURE — 84484 ASSAY OF TROPONIN QUANT: CPT | Performed by: STUDENT IN AN ORGANIZED HEALTH CARE EDUCATION/TRAINING PROGRAM

## 2021-10-30 PROCEDURE — 85379 FIBRIN DEGRADATION QUANT: CPT | Performed by: STUDENT IN AN ORGANIZED HEALTH CARE EDUCATION/TRAINING PROGRAM

## 2021-10-30 PROCEDURE — 71046 X-RAY EXAM CHEST 2 VIEWS: CPT | Performed by: RADIOLOGY

## 2021-10-30 PROCEDURE — 0 TECHNETIUM ALBUMIN AGGREGATED: Performed by: STUDENT IN AN ORGANIZED HEALTH CARE EDUCATION/TRAINING PROGRAM

## 2021-10-30 PROCEDURE — 87640 STAPH A DNA AMP PROBE: CPT | Performed by: PHYSICIAN ASSISTANT

## 2021-10-30 PROCEDURE — 87641 MR-STAPH DNA AMP PROBE: CPT | Performed by: PHYSICIAN ASSISTANT

## 2021-10-30 PROCEDURE — 83735 ASSAY OF MAGNESIUM: CPT | Performed by: STUDENT IN AN ORGANIZED HEALTH CARE EDUCATION/TRAINING PROGRAM

## 2021-10-30 PROCEDURE — 85025 COMPLETE CBC W/AUTO DIFF WBC: CPT | Performed by: STUDENT IN AN ORGANIZED HEALTH CARE EDUCATION/TRAINING PROGRAM

## 2021-10-30 PROCEDURE — 63710000001 INSULIN REGULAR HUMAN PER 5 UNITS: Performed by: PHYSICIAN ASSISTANT

## 2021-10-30 PROCEDURE — 36600 WITHDRAWAL OF ARTERIAL BLOOD: CPT

## 2021-10-30 PROCEDURE — 85610 PROTHROMBIN TIME: CPT | Performed by: STUDENT IN AN ORGANIZED HEALTH CARE EDUCATION/TRAINING PROGRAM

## 2021-10-30 PROCEDURE — 78580 LUNG PERFUSION IMAGING: CPT | Performed by: RADIOLOGY

## 2021-10-30 PROCEDURE — 71046 X-RAY EXAM CHEST 2 VIEWS: CPT

## 2021-10-30 PROCEDURE — 63710000001 INSULIN REGULAR HUMAN PER 5 UNITS: Performed by: STUDENT IN AN ORGANIZED HEALTH CARE EDUCATION/TRAINING PROGRAM

## 2021-10-30 PROCEDURE — 99284 EMERGENCY DEPT VISIT MOD MDM: CPT

## 2021-10-30 PROCEDURE — 80053 COMPREHEN METABOLIC PANEL: CPT | Performed by: STUDENT IN AN ORGANIZED HEALTH CARE EDUCATION/TRAINING PROGRAM

## 2021-10-30 PROCEDURE — 87040 BLOOD CULTURE FOR BACTERIA: CPT | Performed by: STUDENT IN AN ORGANIZED HEALTH CARE EDUCATION/TRAINING PROGRAM

## 2021-10-30 PROCEDURE — 83880 ASSAY OF NATRIURETIC PEPTIDE: CPT | Performed by: STUDENT IN AN ORGANIZED HEALTH CARE EDUCATION/TRAINING PROGRAM

## 2021-10-30 PROCEDURE — 82375 ASSAY CARBOXYHB QUANT: CPT

## 2021-10-30 PROCEDURE — 99223 1ST HOSP IP/OBS HIGH 75: CPT | Performed by: PHYSICIAN ASSISTANT

## 2021-10-30 PROCEDURE — 25010000002 VANCOMYCIN 5 G RECONSTITUTED SOLUTION: Performed by: INTERNAL MEDICINE

## 2021-10-30 RX ORDER — HYDROXYZINE HYDROCHLORIDE 25 MG/1
25 TABLET, FILM COATED ORAL NIGHTLY
Status: DISCONTINUED | OUTPATIENT
Start: 2021-10-30 | End: 2021-11-06 | Stop reason: HOSPADM

## 2021-10-30 RX ORDER — NICOTINE POLACRILEX 4 MG
15 LOZENGE BUCCAL
Status: DISCONTINUED | OUTPATIENT
Start: 2021-10-30 | End: 2021-11-01 | Stop reason: SDUPTHER

## 2021-10-30 RX ORDER — BENZONATATE 100 MG/1
100 CAPSULE ORAL 3 TIMES DAILY PRN
Status: DISCONTINUED | OUTPATIENT
Start: 2021-10-30 | End: 2021-11-06 | Stop reason: HOSPADM

## 2021-10-30 RX ORDER — FAMOTIDINE 20 MG/1
10 TABLET, FILM COATED ORAL NIGHTLY PRN
Status: DISCONTINUED | OUTPATIENT
Start: 2021-10-30 | End: 2021-11-06 | Stop reason: HOSPADM

## 2021-10-30 RX ORDER — FLUTICASONE PROPIONATE 50 MCG
1 SPRAY, SUSPENSION (ML) NASAL 2 TIMES DAILY
Status: DISCONTINUED | OUTPATIENT
Start: 2021-10-30 | End: 2021-11-06 | Stop reason: HOSPADM

## 2021-10-30 RX ORDER — AMLODIPINE BESYLATE 5 MG/1
5 TABLET ORAL
Status: DISCONTINUED | OUTPATIENT
Start: 2021-10-31 | End: 2021-11-06 | Stop reason: HOSPADM

## 2021-10-30 RX ORDER — SODIUM CHLORIDE 0.9 % (FLUSH) 0.9 %
10 SYRINGE (ML) INJECTION EVERY 12 HOURS SCHEDULED
Status: DISCONTINUED | OUTPATIENT
Start: 2021-10-30 | End: 2021-11-06 | Stop reason: HOSPADM

## 2021-10-30 RX ORDER — NICOTINE POLACRILEX 4 MG
15 LOZENGE BUCCAL
Status: DISCONTINUED | OUTPATIENT
Start: 2021-10-30 | End: 2021-11-06 | Stop reason: HOSPADM

## 2021-10-30 RX ORDER — PRAVASTATIN SODIUM 10 MG
10 TABLET ORAL DAILY
Status: DISCONTINUED | OUTPATIENT
Start: 2021-10-31 | End: 2021-10-31 | Stop reason: CLARIF

## 2021-10-30 RX ORDER — ALBUTEROL SULFATE 2.5 MG/3ML
2.5 SOLUTION RESPIRATORY (INHALATION) EVERY 4 HOURS PRN
Status: CANCELLED | OUTPATIENT
Start: 2021-10-30

## 2021-10-30 RX ORDER — CLOBETASOL PROPIONATE 0.5 MG/G
CREAM TOPICAL 2 TIMES DAILY PRN
Status: DISCONTINUED | OUTPATIENT
Start: 2021-10-30 | End: 2021-11-06 | Stop reason: HOSPADM

## 2021-10-30 RX ORDER — FAMOTIDINE 20 MG/1
20 TABLET, FILM COATED ORAL NIGHTLY PRN
Status: CANCELLED | OUTPATIENT
Start: 2021-10-30

## 2021-10-30 RX ORDER — PANTOPRAZOLE SODIUM 40 MG/1
40 TABLET, DELAYED RELEASE ORAL DAILY
Status: DISCONTINUED | OUTPATIENT
Start: 2021-10-31 | End: 2021-11-06 | Stop reason: HOSPADM

## 2021-10-30 RX ORDER — SODIUM CHLORIDE 0.9 % (FLUSH) 0.9 %
10 SYRINGE (ML) INJECTION AS NEEDED
Status: DISCONTINUED | OUTPATIENT
Start: 2021-10-30 | End: 2021-11-06 | Stop reason: HOSPADM

## 2021-10-30 RX ORDER — DEXTROSE MONOHYDRATE 25 G/50ML
25 INJECTION, SOLUTION INTRAVENOUS
Status: DISCONTINUED | OUTPATIENT
Start: 2021-10-30 | End: 2021-11-06 | Stop reason: HOSPADM

## 2021-10-30 RX ORDER — MONTELUKAST SODIUM 10 MG/1
10 TABLET ORAL NIGHTLY
Status: DISCONTINUED | OUTPATIENT
Start: 2021-10-30 | End: 2021-11-06 | Stop reason: HOSPADM

## 2021-10-30 RX ORDER — DEXTROSE MONOHYDRATE 25 G/50ML
25 INJECTION, SOLUTION INTRAVENOUS
Status: DISCONTINUED | OUTPATIENT
Start: 2021-10-30 | End: 2021-11-01 | Stop reason: SDUPTHER

## 2021-10-30 RX ORDER — ASPIRIN 81 MG/1
81 TABLET ORAL DAILY
Status: DISCONTINUED | OUTPATIENT
Start: 2021-10-31 | End: 2021-11-06 | Stop reason: HOSPADM

## 2021-10-30 RX ORDER — LINEZOLID 2 MG/ML
600 INJECTION, SOLUTION INTRAVENOUS EVERY 12 HOURS
Status: DISCONTINUED | OUTPATIENT
Start: 2021-10-30 | End: 2021-10-30

## 2021-10-30 RX ADMIN — APIXABAN 5 MG: 5 TABLET, FILM COATED ORAL at 21:21

## 2021-10-30 RX ADMIN — FLUTICASONE PROPIONATE 1 SPRAY: 50 SPRAY, METERED NASAL at 21:22

## 2021-10-30 RX ADMIN — SODIUM CHLORIDE, PRESERVATIVE FREE 10 ML: 5 INJECTION INTRAVENOUS at 19:37

## 2021-10-30 RX ADMIN — LINEZOLID 600 MG: 600 INJECTION, SOLUTION INTRAVENOUS at 16:14

## 2021-10-30 RX ADMIN — HYDROCODONE BITARTRATE AND ACETAMINOPHEN 10 ML: 7.5; 325 SOLUTION ORAL at 15:12

## 2021-10-30 RX ADMIN — INSULIN ASPART 10 UNITS: 100 INJECTION, SOLUTION INTRAVENOUS; SUBCUTANEOUS at 19:37

## 2021-10-30 RX ADMIN — VANCOMYCIN HYDROCHLORIDE 1500 MG: 5 INJECTION, POWDER, LYOPHILIZED, FOR SOLUTION INTRAVENOUS at 19:37

## 2021-10-30 RX ADMIN — METRONIDAZOLE 500 MG: 500 INJECTION, SOLUTION INTRAVENOUS at 17:28

## 2021-10-30 RX ADMIN — KIT FOR THE PREPARATION OF TECHNETIUM TC 99M ALBUMIN AGGREGATED 1 DOSE: 2.5 INJECTION, POWDER, FOR SOLUTION INTRAVENOUS at 14:27

## 2021-10-30 RX ADMIN — HYDROXYZINE HYDROCHLORIDE 25 MG: 25 TABLET ORAL at 21:24

## 2021-10-30 RX ADMIN — HUMAN INSULIN 5 UNITS: 100 INJECTION, SOLUTION SUBCUTANEOUS at 16:18

## 2021-10-30 RX ADMIN — MONTELUKAST SODIUM 10 MG: 10 TABLET, COATED ORAL at 21:21

## 2021-10-30 RX ADMIN — CEFEPIME HYDROCHLORIDE 1 G: 1 INJECTION, POWDER, FOR SOLUTION INTRAMUSCULAR; INTRAVENOUS at 16:51

## 2021-10-30 RX ADMIN — OFLOXACIN 50000 UNITS: 300 TABLET, COATED ORAL at 21:21

## 2021-10-30 RX ADMIN — HUMAN INSULIN 3 UNITS: 100 INJECTION, SOLUTION SUBCUTANEOUS at 17:27

## 2021-10-30 RX ADMIN — SODIUM CHLORIDE 500 ML: 9 INJECTION, SOLUTION INTRAVENOUS at 16:13

## 2021-10-31 ENCOUNTER — APPOINTMENT (OUTPATIENT)
Dept: ULTRASOUND IMAGING | Facility: HOSPITAL | Age: 56
End: 2021-10-31

## 2021-10-31 ENCOUNTER — APPOINTMENT (OUTPATIENT)
Dept: CARDIOLOGY | Facility: HOSPITAL | Age: 56
End: 2021-10-31

## 2021-10-31 LAB
ALBUMIN SERPL-MCNC: 2.69 G/DL (ref 3.5–5.2)
ALBUMIN/GLOB SERPL: 0.8 G/DL
ALP SERPL-CCNC: 92 U/L (ref 39–117)
ALT SERPL W P-5'-P-CCNC: 6 U/L (ref 1–33)
ANION GAP SERPL CALCULATED.3IONS-SCNC: 8.8 MMOL/L (ref 5–15)
AST SERPL-CCNC: 7 U/L (ref 1–32)
BH CV ECHO MEAS - ACS: 1.6 CM
BH CV ECHO MEAS - AO MAX PG: 5.6 MMHG
BH CV ECHO MEAS - AO MEAN PG: 4 MMHG
BH CV ECHO MEAS - AO ROOT AREA (BSA CORRECTED): 1.8
BH CV ECHO MEAS - AO ROOT AREA: 8 CM^2
BH CV ECHO MEAS - AO ROOT DIAM: 3.2 CM
BH CV ECHO MEAS - AO V2 MAX: 118 CM/SEC
BH CV ECHO MEAS - AO V2 MEAN: 92.5 CM/SEC
BH CV ECHO MEAS - AO V2 VTI: 24.7 CM
BH CV ECHO MEAS - BSA(HAYCOCK): 1.9 M^2
BH CV ECHO MEAS - BSA: 1.8 M^2
BH CV ECHO MEAS - BZI_BMI: 28.3 KILOGRAMS/M^2
BH CV ECHO MEAS - BZI_METRIC_HEIGHT: 162.6 CM
BH CV ECHO MEAS - BZI_METRIC_WEIGHT: 74.8 KG
BH CV ECHO MEAS - EDV(CUBED): 46.7 ML
BH CV ECHO MEAS - EDV(MOD-SP4): 43.1 ML
BH CV ECHO MEAS - EDV(TEICH): 54.4 ML
BH CV ECHO MEAS - EF(CUBED): 57.8 %
BH CV ECHO MEAS - EF(MOD-SP4): 61.5 %
BH CV ECHO MEAS - EF(TEICH): 50.4 %
BH CV ECHO MEAS - ESV(CUBED): 19.7 ML
BH CV ECHO MEAS - ESV(MOD-SP4): 16.6 ML
BH CV ECHO MEAS - ESV(TEICH): 27 ML
BH CV ECHO MEAS - FS: 25 %
BH CV ECHO MEAS - IVS/LVPW: 1.3
BH CV ECHO MEAS - IVSD: 1.6 CM
BH CV ECHO MEAS - LA DIMENSION: 3 CM
BH CV ECHO MEAS - LA/AO: 0.94
BH CV ECHO MEAS - LV DIASTOLIC VOL/BSA (35-75): 23.9 ML/M^2
BH CV ECHO MEAS - LV MASS(C)D: 179.9 GRAMS
BH CV ECHO MEAS - LV MASS(C)DI: 99.8 GRAMS/M^2
BH CV ECHO MEAS - LV SYSTOLIC VOL/BSA (12-30): 9.2 ML/M^2
BH CV ECHO MEAS - LVIDD: 3.6 CM
BH CV ECHO MEAS - LVIDS: 2.7 CM
BH CV ECHO MEAS - LVLD AP4: 7.2 CM
BH CV ECHO MEAS - LVLS AP4: 6.2 CM
BH CV ECHO MEAS - LVOT AREA (M): 2.8 CM^2
BH CV ECHO MEAS - LVOT AREA: 2.8 CM^2
BH CV ECHO MEAS - LVOT DIAM: 1.9 CM
BH CV ECHO MEAS - LVPWD: 1.2 CM
BH CV ECHO MEAS - MV A MAX VEL: 99 CM/SEC
BH CV ECHO MEAS - MV E MAX VEL: 108 CM/SEC
BH CV ECHO MEAS - MV E/A: 1.1
BH CV ECHO MEAS - PA ACC TIME: 0.11 SEC
BH CV ECHO MEAS - PA PR(ACCEL): 28.2 MMHG
BH CV ECHO MEAS - SI(AO): 110.2 ML/M^2
BH CV ECHO MEAS - SI(CUBED): 15 ML/M^2
BH CV ECHO MEAS - SI(MOD-SP4): 14.7 ML/M^2
BH CV ECHO MEAS - SI(TEICH): 15.2 ML/M^2
BH CV ECHO MEAS - SV(AO): 198.6 ML
BH CV ECHO MEAS - SV(CUBED): 27 ML
BH CV ECHO MEAS - SV(MOD-SP4): 26.5 ML
BH CV ECHO MEAS - SV(TEICH): 27.4 ML
BILIRUB SERPL-MCNC: 0.4 MG/DL (ref 0–1.2)
BUN SERPL-MCNC: 28 MG/DL (ref 6–20)
BUN/CREAT SERPL: 8.9 (ref 7–25)
CALCIUM SPEC-SCNC: 7.7 MG/DL (ref 8.6–10.5)
CHLORIDE SERPL-SCNC: 108 MMOL/L (ref 98–107)
CO2 SERPL-SCNC: 20.2 MMOL/L (ref 22–29)
CREAT SERPL-MCNC: 3.14 MG/DL (ref 0.57–1)
CRP SERPL-MCNC: 4.02 MG/DL (ref 0–0.5)
D-LACTATE SERPL-SCNC: 0.8 MMOL/L (ref 0.5–2)
DEPRECATED RDW RBC AUTO: 54.2 FL (ref 37–54)
ERYTHROCYTE [DISTWIDTH] IN BLOOD BY AUTOMATED COUNT: 16.6 % (ref 12.3–15.4)
GFR SERPL CREATININE-BSD FRML MDRD: 15 ML/MIN/1.73
GLOBULIN UR ELPH-MCNC: 3.5 GM/DL
GLUCOSE BLDC GLUCOMTR-MCNC: 141 MG/DL (ref 70–130)
GLUCOSE BLDC GLUCOMTR-MCNC: 243 MG/DL (ref 70–130)
GLUCOSE BLDC GLUCOMTR-MCNC: 75 MG/DL (ref 70–130)
GLUCOSE BLDC GLUCOMTR-MCNC: 86 MG/DL (ref 70–130)
GLUCOSE SERPL-MCNC: 78 MG/DL (ref 65–99)
HCT VFR BLD AUTO: 31.6 % (ref 34–46.6)
HGB BLD-MCNC: 9.7 G/DL (ref 12–15.9)
LV EF 2D ECHO EST: 60 %
MAXIMAL PREDICTED HEART RATE: 162 BPM
MCH RBC QN AUTO: 27.5 PG (ref 26.6–33)
MCHC RBC AUTO-ENTMCNC: 30.7 G/DL (ref 31.5–35.7)
MCV RBC AUTO: 89.5 FL (ref 79–97)
PLATELET # BLD AUTO: 237 10*3/MM3 (ref 140–450)
PMV BLD AUTO: 9.5 FL (ref 6–12)
POTASSIUM SERPL-SCNC: 4.6 MMOL/L (ref 3.5–5.2)
PROT SERPL-MCNC: 6.2 G/DL (ref 6–8.5)
RBC # BLD AUTO: 3.53 10*6/MM3 (ref 3.77–5.28)
S PNEUM AG SPEC QL LA: NEGATIVE
SODIUM SERPL-SCNC: 137 MMOL/L (ref 136–145)
STRESS TARGET HR: 138 BPM
VANCOMYCIN SERPL-MCNC: 18.7 MCG/ML (ref 5–40)
WBC # BLD AUTO: 9.03 10*3/MM3 (ref 3.4–10.8)

## 2021-10-31 PROCEDURE — 83605 ASSAY OF LACTIC ACID: CPT | Performed by: HOSPITALIST

## 2021-10-31 PROCEDURE — 63710000001 INSULIN DETEMIR PER 5 UNITS: Performed by: INTERNAL MEDICINE

## 2021-10-31 PROCEDURE — 85027 COMPLETE CBC AUTOMATED: CPT | Performed by: INTERNAL MEDICINE

## 2021-10-31 PROCEDURE — 93970 EXTREMITY STUDY: CPT

## 2021-10-31 PROCEDURE — 82962 GLUCOSE BLOOD TEST: CPT

## 2021-10-31 PROCEDURE — 93306 TTE W/DOPPLER COMPLETE: CPT | Performed by: INTERNAL MEDICINE

## 2021-10-31 PROCEDURE — 80202 ASSAY OF VANCOMYCIN: CPT | Performed by: INTERNAL MEDICINE

## 2021-10-31 PROCEDURE — 80053 COMPREHEN METABOLIC PANEL: CPT | Performed by: INTERNAL MEDICINE

## 2021-10-31 PROCEDURE — 93306 TTE W/DOPPLER COMPLETE: CPT

## 2021-10-31 PROCEDURE — 86140 C-REACTIVE PROTEIN: CPT | Performed by: INTERNAL MEDICINE

## 2021-10-31 PROCEDURE — 25010000002 CEFEPIME PER 500 MG: Performed by: INTERNAL MEDICINE

## 2021-10-31 PROCEDURE — 99233 SBSQ HOSP IP/OBS HIGH 50: CPT | Performed by: INTERNAL MEDICINE

## 2021-10-31 PROCEDURE — 76775 US EXAM ABDO BACK WALL LIM: CPT

## 2021-10-31 PROCEDURE — 25010000002 ONDANSETRON PER 1 MG

## 2021-10-31 RX ORDER — ATORVASTATIN CALCIUM 10 MG/1
10 TABLET, FILM COATED ORAL DAILY
Status: DISCONTINUED | OUTPATIENT
Start: 2021-10-31 | End: 2021-11-01

## 2021-10-31 RX ORDER — SODIUM CHLORIDE 0.9 % (FLUSH) 0.9 %
10 SYRINGE (ML) INJECTION EVERY 12 HOURS SCHEDULED
Status: DISCONTINUED | OUTPATIENT
Start: 2021-10-31 | End: 2021-11-06 | Stop reason: HOSPADM

## 2021-10-31 RX ORDER — ONDANSETRON 2 MG/ML
INJECTION INTRAMUSCULAR; INTRAVENOUS
Status: COMPLETED
Start: 2021-10-31 | End: 2021-10-31

## 2021-10-31 RX ORDER — ONDANSETRON 2 MG/ML
4 INJECTION INTRAMUSCULAR; INTRAVENOUS EVERY 6 HOURS PRN
Status: DISCONTINUED | OUTPATIENT
Start: 2021-10-31 | End: 2021-11-06 | Stop reason: HOSPADM

## 2021-10-31 RX ORDER — SODIUM CHLORIDE 0.9 % (FLUSH) 0.9 %
10 SYRINGE (ML) INJECTION AS NEEDED
Status: DISCONTINUED | OUTPATIENT
Start: 2021-10-31 | End: 2021-11-06 | Stop reason: HOSPADM

## 2021-10-31 RX ADMIN — INSULIN DETEMIR 5 UNITS: 100 INJECTION, SOLUTION SUBCUTANEOUS at 21:03

## 2021-10-31 RX ADMIN — MONTELUKAST SODIUM 10 MG: 10 TABLET, COATED ORAL at 20:53

## 2021-10-31 RX ADMIN — APIXABAN 5 MG: 5 TABLET, FILM COATED ORAL at 08:35

## 2021-10-31 RX ADMIN — SODIUM CHLORIDE, PRESERVATIVE FREE 10 ML: 5 INJECTION INTRAVENOUS at 20:56

## 2021-10-31 RX ADMIN — VANCOMYCIN HYDROCHLORIDE 1000 MG: 1 INJECTION, POWDER, LYOPHILIZED, FOR SOLUTION INTRAVENOUS at 15:54

## 2021-10-31 RX ADMIN — SODIUM CHLORIDE, PRESERVATIVE FREE 10 ML: 5 INJECTION INTRAVENOUS at 08:34

## 2021-10-31 RX ADMIN — FLUTICASONE PROPIONATE 1 SPRAY: 50 SPRAY, METERED NASAL at 08:34

## 2021-10-31 RX ADMIN — CEFEPIME HYDROCHLORIDE 2 G: 2 INJECTION, POWDER, FOR SOLUTION INTRAVENOUS at 17:06

## 2021-10-31 RX ADMIN — ATORVASTATIN CALCIUM 10 MG: 10 TABLET, FILM COATED ORAL at 11:02

## 2021-10-31 RX ADMIN — ASPIRIN 81 MG: 81 TABLET, COATED ORAL at 08:35

## 2021-10-31 RX ADMIN — ONDANSETRON 4 MG: 2 INJECTION INTRAMUSCULAR; INTRAVENOUS at 11:23

## 2021-10-31 RX ADMIN — APIXABAN 5 MG: 5 TABLET, FILM COATED ORAL at 20:53

## 2021-10-31 RX ADMIN — BENZONATATE 100 MG: 100 CAPSULE ORAL at 02:57

## 2021-10-31 RX ADMIN — AMLODIPINE BESYLATE 5 MG: 5 TABLET ORAL at 08:35

## 2021-10-31 RX ADMIN — METRONIDAZOLE 500 MG: 500 INJECTION, SOLUTION INTRAVENOUS at 02:58

## 2021-10-31 RX ADMIN — HYDROXYZINE HYDROCHLORIDE 25 MG: 25 TABLET ORAL at 20:53

## 2021-10-31 RX ADMIN — PANTOPRAZOLE SODIUM 40 MG: 40 TABLET, DELAYED RELEASE ORAL at 08:35

## 2021-10-31 RX ADMIN — FLUTICASONE PROPIONATE 1 SPRAY: 50 SPRAY, METERED NASAL at 20:55

## 2021-11-01 LAB
ALBUMIN SERPL-MCNC: 2.47 G/DL (ref 3.5–5.2)
ALBUMIN/GLOB SERPL: 0.7 G/DL
ALP SERPL-CCNC: 91 U/L (ref 39–117)
ALT SERPL W P-5'-P-CCNC: 6 U/L (ref 1–33)
ANION GAP SERPL CALCULATED.3IONS-SCNC: 7.3 MMOL/L (ref 5–15)
AST SERPL-CCNC: 7 U/L (ref 1–32)
BILIRUB SERPL-MCNC: 0.3 MG/DL (ref 0–1.2)
BUN SERPL-MCNC: 32 MG/DL (ref 6–20)
BUN/CREAT SERPL: 9.4 (ref 7–25)
CALCIUM SPEC-SCNC: 7.9 MG/DL (ref 8.6–10.5)
CHLORIDE SERPL-SCNC: 108 MMOL/L (ref 98–107)
CK SERPL-CCNC: 31 U/L (ref 20–180)
CO2 SERPL-SCNC: 19.7 MMOL/L (ref 22–29)
CREAT SERPL-MCNC: 3.42 MG/DL (ref 0.57–1)
CRP SERPL-MCNC: 5.96 MG/DL (ref 0–0.5)
DEPRECATED RDW RBC AUTO: 55.8 FL (ref 37–54)
ERYTHROCYTE [DISTWIDTH] IN BLOOD BY AUTOMATED COUNT: 16.7 % (ref 12.3–15.4)
GFR SERPL CREATININE-BSD FRML MDRD: 14 ML/MIN/1.73
GFR SERPL CREATININE-BSD FRML MDRD: ABNORMAL ML/MIN/{1.73_M2}
GLOBULIN UR ELPH-MCNC: 3.5 GM/DL
GLUCOSE BLDC GLUCOMTR-MCNC: 161 MG/DL (ref 70–130)
GLUCOSE BLDC GLUCOMTR-MCNC: 182 MG/DL (ref 70–130)
GLUCOSE BLDC GLUCOMTR-MCNC: 344 MG/DL (ref 70–130)
GLUCOSE BLDC GLUCOMTR-MCNC: 411 MG/DL (ref 70–130)
GLUCOSE SERPL-MCNC: 228 MG/DL (ref 65–99)
HAV IGM SERPL QL IA: NORMAL
HBV CORE IGM SERPL QL IA: NORMAL
HBV SURFACE AG SERPL QL IA: NORMAL
HCT VFR BLD AUTO: 31.7 % (ref 34–46.6)
HCV AB SER DONR QL: NORMAL
HGB BLD-MCNC: 9.7 G/DL (ref 12–15.9)
HIV1+2 AB SER QL: NORMAL
MCH RBC QN AUTO: 27.8 PG (ref 26.6–33)
MCHC RBC AUTO-ENTMCNC: 30.6 G/DL (ref 31.5–35.7)
MCV RBC AUTO: 90.8 FL (ref 79–97)
PLATELET # BLD AUTO: 248 10*3/MM3 (ref 140–450)
PMV BLD AUTO: 9.3 FL (ref 6–12)
POTASSIUM SERPL-SCNC: 5.2 MMOL/L (ref 3.5–5.2)
PROT SERPL-MCNC: 6 G/DL (ref 6–8.5)
RBC # BLD AUTO: 3.49 10*6/MM3 (ref 3.77–5.28)
SODIUM SERPL-SCNC: 135 MMOL/L (ref 136–145)
WBC # BLD AUTO: 8.02 10*3/MM3 (ref 3.4–10.8)

## 2021-11-01 PROCEDURE — 86235 NUCLEAR ANTIGEN ANTIBODY: CPT | Performed by: INTERNAL MEDICINE

## 2021-11-01 PROCEDURE — 86256 FLUORESCENT ANTIBODY TITER: CPT | Performed by: STUDENT IN AN ORGANIZED HEALTH CARE EDUCATION/TRAINING PROGRAM

## 2021-11-01 PROCEDURE — 25010000002 CEFEPIME PER 500 MG: Performed by: INTERNAL MEDICINE

## 2021-11-01 PROCEDURE — 87305 ASPERGILLUS AG IA: CPT | Performed by: INTERNAL MEDICINE

## 2021-11-01 PROCEDURE — 80053 COMPREHEN METABOLIC PANEL: CPT | Performed by: INTERNAL MEDICINE

## 2021-11-01 PROCEDURE — 86160 COMPLEMENT ANTIGEN: CPT | Performed by: STUDENT IN AN ORGANIZED HEALTH CARE EDUCATION/TRAINING PROGRAM

## 2021-11-01 PROCEDURE — 25010000002 ONDANSETRON PER 1 MG: Performed by: INTERNAL MEDICINE

## 2021-11-01 PROCEDURE — G0432 EIA HIV-1/HIV-2 SCREEN: HCPCS | Performed by: STUDENT IN AN ORGANIZED HEALTH CARE EDUCATION/TRAINING PROGRAM

## 2021-11-01 PROCEDURE — 86200 CCP ANTIBODY: CPT | Performed by: INTERNAL MEDICINE

## 2021-11-01 PROCEDURE — 86225 DNA ANTIBODY NATIVE: CPT | Performed by: STUDENT IN AN ORGANIZED HEALTH CARE EDUCATION/TRAINING PROGRAM

## 2021-11-01 PROCEDURE — 83520 IMMUNOASSAY QUANT NOS NONAB: CPT | Performed by: STUDENT IN AN ORGANIZED HEALTH CARE EDUCATION/TRAINING PROGRAM

## 2021-11-01 PROCEDURE — 83516 IMMUNOASSAY NONANTIBODY: CPT | Performed by: STUDENT IN AN ORGANIZED HEALTH CARE EDUCATION/TRAINING PROGRAM

## 2021-11-01 PROCEDURE — 82962 GLUCOSE BLOOD TEST: CPT

## 2021-11-01 PROCEDURE — 82550 ASSAY OF CK (CPK): CPT | Performed by: STUDENT IN AN ORGANIZED HEALTH CARE EDUCATION/TRAINING PROGRAM

## 2021-11-01 PROCEDURE — 63710000001 PREDNISONE PER 1 MG: Performed by: INTERNAL MEDICINE

## 2021-11-01 PROCEDURE — 99233 SBSQ HOSP IP/OBS HIGH 50: CPT | Performed by: STUDENT IN AN ORGANIZED HEALTH CARE EDUCATION/TRAINING PROGRAM

## 2021-11-01 PROCEDURE — 80074 ACUTE HEPATITIS PANEL: CPT | Performed by: STUDENT IN AN ORGANIZED HEALTH CARE EDUCATION/TRAINING PROGRAM

## 2021-11-01 PROCEDURE — 86225 DNA ANTIBODY NATIVE: CPT | Performed by: INTERNAL MEDICINE

## 2021-11-01 PROCEDURE — 63710000001 INSULIN DETEMIR PER 5 UNITS: Performed by: INTERNAL MEDICINE

## 2021-11-01 PROCEDURE — 99223 1ST HOSP IP/OBS HIGH 75: CPT | Performed by: INTERNAL MEDICINE

## 2021-11-01 PROCEDURE — 63710000001 INSULIN ASPART PER 5 UNITS: Performed by: HOSPITALIST

## 2021-11-01 PROCEDURE — 85027 COMPLETE CBC AUTOMATED: CPT | Performed by: INTERNAL MEDICINE

## 2021-11-01 PROCEDURE — 86140 C-REACTIVE PROTEIN: CPT | Performed by: INTERNAL MEDICINE

## 2021-11-01 RX ORDER — ATORVASTATIN CALCIUM 40 MG/1
40 TABLET, FILM COATED ORAL DAILY
Status: DISCONTINUED | OUTPATIENT
Start: 2021-11-02 | End: 2021-11-06 | Stop reason: HOSPADM

## 2021-11-01 RX ORDER — SODIUM BICARBONATE 650 MG/1
650 TABLET ORAL 2 TIMES DAILY
Status: DISCONTINUED | OUTPATIENT
Start: 2021-11-01 | End: 2021-11-06 | Stop reason: HOSPADM

## 2021-11-01 RX ORDER — PREDNISONE 20 MG/1
40 TABLET ORAL
Status: DISCONTINUED | OUTPATIENT
Start: 2021-11-01 | End: 2021-11-03

## 2021-11-01 RX ADMIN — SODIUM BICARBONATE TAB 650 MG 650 MG: 650 TAB at 20:21

## 2021-11-01 RX ADMIN — SODIUM CHLORIDE, PRESERVATIVE FREE 10 ML: 5 INJECTION INTRAVENOUS at 09:15

## 2021-11-01 RX ADMIN — ASPIRIN 81 MG: 81 TABLET, COATED ORAL at 09:13

## 2021-11-01 RX ADMIN — APIXABAN 5 MG: 5 TABLET, FILM COATED ORAL at 09:14

## 2021-11-01 RX ADMIN — LINACLOTIDE 145 MCG: 145 CAPSULE, GELATIN COATED ORAL at 09:14

## 2021-11-01 RX ADMIN — SODIUM CHLORIDE, PRESERVATIVE FREE 10 ML: 5 INJECTION INTRAVENOUS at 09:18

## 2021-11-01 RX ADMIN — INSULIN ASPART 3 UNITS: 100 INJECTION, SOLUTION INTRAVENOUS; SUBCUTANEOUS at 12:18

## 2021-11-01 RX ADMIN — SODIUM CHLORIDE, PRESERVATIVE FREE 10 ML: 5 INJECTION INTRAVENOUS at 20:23

## 2021-11-01 RX ADMIN — MONTELUKAST SODIUM 10 MG: 10 TABLET, COATED ORAL at 20:21

## 2021-11-01 RX ADMIN — HYDROXYZINE HYDROCHLORIDE 25 MG: 25 TABLET ORAL at 20:21

## 2021-11-01 RX ADMIN — INSULIN ASPART 2 UNITS: 100 INJECTION, SOLUTION INTRAVENOUS; SUBCUTANEOUS at 09:12

## 2021-11-01 RX ADMIN — CEFEPIME HYDROCHLORIDE 2 G: 2 INJECTION, POWDER, FOR SOLUTION INTRAVENOUS at 16:42

## 2021-11-01 RX ADMIN — INSULIN ASPART 14 UNITS: 100 INJECTION, SOLUTION INTRAVENOUS; SUBCUTANEOUS at 18:18

## 2021-11-01 RX ADMIN — ATORVASTATIN CALCIUM 10 MG: 10 TABLET, FILM COATED ORAL at 09:14

## 2021-11-01 RX ADMIN — AMLODIPINE BESYLATE 5 MG: 5 TABLET ORAL at 09:15

## 2021-11-01 RX ADMIN — FLUTICASONE PROPIONATE 1 SPRAY: 50 SPRAY, METERED NASAL at 20:23

## 2021-11-01 RX ADMIN — BENZONATATE 100 MG: 100 CAPSULE ORAL at 02:57

## 2021-11-01 RX ADMIN — SODIUM BICARBONATE 100 ML/HR: 84 INJECTION, SOLUTION INTRAVENOUS at 16:29

## 2021-11-01 RX ADMIN — PREDNISONE 40 MG: 20 TABLET ORAL at 12:07

## 2021-11-01 RX ADMIN — ONDANSETRON 4 MG: 2 INJECTION INTRAMUSCULAR; INTRAVENOUS at 05:33

## 2021-11-01 RX ADMIN — FLUTICASONE PROPIONATE 1 SPRAY: 50 SPRAY, METERED NASAL at 09:18

## 2021-11-01 RX ADMIN — INSULIN DETEMIR 5 UNITS: 100 INJECTION, SOLUTION SUBCUTANEOUS at 20:43

## 2021-11-01 RX ADMIN — PANTOPRAZOLE SODIUM 40 MG: 40 TABLET, DELAYED RELEASE ORAL at 09:15

## 2021-11-01 NOTE — PAYOR COMM NOTE
"CONTACT:  MEKHI NORMAN MSN, APRN  UTILIZATION MANAGEMENT DEPT.  [facility]  [address] [facility] [place], [address]  PHONE:  [phone]  FAX: [phone]    Inpatient authorization request.    Jessica Hamm (56 y.o. Female)             Date of Birth Social Security Number Address Home Phone MRN    1965  [address] [facility] [address] [phone] [id number]    Yazidi Marital Status             None Single       Admission Date Admission Type Admitting Provider Attending Provider Department, Room/Bed    10/30/21 Emergency Stephan Bolton MD Williams, Curtis Anthony, DO [facility] PROGRESS CARE, P221/1P    Discharge Date Discharge Disposition Discharge Destination                         Attending Provider: Roland Alberto DO    Allergies: Bactrim [Sulfamethoxazole-trimethoprim], Sulfa Antibiotics, Benadryl [Diphenhydramine Hcl (Sleep)], Penicillins    Isolation: None   Infection: COVID (History) (10/31/21)   Code Status: CPR   Advance Care Planning Activity    Ht: 162.6 cm (64.02\")   Wt: 74.8 kg (164 lb 12.8 oz)    Admission Cmt: None   Principal Problem: None                Active Insurance as of 10/30/2021     Primary Coverage     Payor Plan Insurance Group Employer/Plan Group    MEDICARE MEDICARE A & B      Payor Plan Address Payor Plan Phone Number Payor Plan Fax Number Effective Dates    [address] [phone]  2/1/2016 - None Entered    [facility] [id number]       Subscriber Name Subscriber Birth Date Member ID       JESSICA HAMM 1965 [id number]           Secondary Coverage     Payor Plan Insurance Group Employer/Plan Group    [facility] MEDICAID      Payor Plan Address Payor Plan Phone Number Payor Plan Fax Number Effective Dates    [address] [address] [phone]  3/9/2017 - None Entered    [facility] [id number]       Subscriber Name Subscriber Birth Date Member ID       JESSICA HAMM 1965 [id number]                 Emergency Contacts     Contact " "Person (Rel.) Home Phone Work Phone Mobile Phone    Africa Simpson (Sister) [phone] -- --    MIRTA HAMM (Son) [phone] -- --               History & Physical      Tonie Maldonado PA-C at 10/30/21 1637     Attestation signed by Stephan Bolton MD at 10/30/21 6764    I have evaluated the patient independently, I have reviewed H&P, all labs and images from today and evaluated the patient at bedside.  I have discussed w/ BHUPENDRA Montalvo and agree.  Patient recently admitted, for covid and bacterial PNA, discharged 10/15 though reports last few days has been having fevers and chills at home, cough, reports was around her neighbor and thinks she might have \"caught it from her\", started on broad spectrum Abx, f/u chest CT, consulted ID as were recently following as well, has been sufficient time to be out of covid isolation, continue 02 via NC, check formal echo as has not had since , Cr up and has solitary kidney, check renal US, consult nephrology, Glc uncontrolled and will adjust insulin regimen accordingly.                              [facility] Medicine Services  History & Physical    Patient Identification:  Name:  Jessica Hamm  Age:  56 y.o.  Sex:  female  :  1965  MRN:  [id number]   Visit Number:  [id number]  Admit Date: 10/30/2021   Primary Care Physician:  Buck Wallis APRN    Subjective     Chief complaint: Worsening dyspnea, recent COVID pneumonia    History of presenting illness:      Jessica Hamm is a 56 y.o. female with past medical history significant for CKD IV previously marked ineligible for transplant during [facility] transplant eval in 2021 per documentation review with mention of poor social support and concern for medical noncompliance, DM2, Essential HTN, COPD, History chronic nausea and vomiting with concern for underlying gastroparesis, hyperparathyroidism.      Mrs. Hamm was hospitalized at this facility from  10/9/2021 through 10/15/21 with sepsis " "2/2 covid-19 pneumonia, ANA PAULA on CKD IV, DM2, essential HTN, Prior to this stay, she had also apparently been at [facility] for stay with COVID-19 PNA.  She was treated with regimen of Levaquin, flagyl, and micafungin with concern for superimposed bacterial pneuononia and underlying UTI.        Upon arrival to the ED, vital signs were T 97.5F, pulse 107, RR 24, and /69.  Room air oxygen saturation was 80% and she reports it had dropped into the 60s at home per her recent pulse oximeter monitor.   She reports worsening dyspnea with exertion and rest since her most recent hospitalization.  She reports worsening chills.   She reports worsening dyspnea at rest and swelling of her lower extremities. She reports she did not use her home insulin this morning because her glucose was low.  She reports she then ate a peanut butter and jelly sandwich and ate some orange juice before coming to the ED.  She reports worsening chills and fatigue. She denies dysuria and hematuria.  She denies chest pain or burning.  She denies receiving steroids since leaving the hospital. She reports she has been compliant with Eliquis after discharge.        Mrs. Bravo reports her PCP obtained CXR on 10/29/21 due to dyspnea.  She reports the person doing the xray asked if her she had recent \"kizzy infection\", because they felt as if her lungs looked similar to such infection.    ---------------------------------------------------------------------------------------------------------------------   Review of Systems   Constitutional: Positive for chills and fatigue. Negative for fever.   HENT: Positive for congestion. Negative for drooling.    Eyes: Negative for pain and discharge.   Respiratory: Positive for cough and shortness of breath. Negative for wheezing.    Cardiovascular: Positive for leg swelling. Negative for chest pain.   Gastrointestinal: Negative for abdominal distention, diarrhea, nausea and vomiting.   Endocrine: Negative for cold " intolerance.   Genitourinary: Negative for difficulty urinating and dysuria.   Musculoskeletal: Negative for arthralgias and gait problem.   Skin: Negative for color change, rash and wound.   Allergic/Immunologic: Negative for environmental allergies and food allergies.   Neurological: Negative for dizziness and headaches.   Hematological: Negative for adenopathy. Does not bruise/bleed easily.   Psychiatric/Behavioral: Negative for agitation and confusion.        ---------------------------------------------------------------------------------------------------------------------   Past Medical History:   Diagnosis Date   • Chronic kidney disease     only has one kidney, has [facility] since last visit    • ESRD on hemodialysis (HCC)    • Essential hypertension    • Hepatic steatosis    • History of noncompliance with medical treatment    • Pre-transplant evaluation for kidney transplant 03/31/2021    Declined by the [facility]   • Single kidney    • Type 2 diabetes mellitus (HCC)      Past Surgical History:   Procedure Laterality Date   • HYSTERECTOMY       Family History   Problem Relation Age of Onset   • Diabetes Mother    • Hypertension Mother    • Diabetes Father    • Hypertension Father    • Diabetes Sister    • Hypertension Sister    • Diabetes Brother      Social History     Socioeconomic History   • Marital status: Single   Tobacco Use   • Smoking status: Never Smoker   • Smokeless tobacco: Never Used   Substance and Sexual Activity   • Alcohol use: No   • Drug use: No   • Sexual activity: Defer     ---------------------------------------------------------------------------------------------------------------------   Allergies:  Bactrim [sulfamethoxazole-trimethoprim], Sulfa antibiotics, Benadryl [diphenhydramine hcl (sleep)], and Penicillins  ---------------------------------------------------------------------------------------------------------------------   Home  medications:    Medications below are reported home medications pulling from within the system; at this time, these medications have not been reconciled unless otherwise specified and are in the verification process for further verifcation as current home medications.  (Not in a hospital admission)      Hospital Scheduled Meds:  cefepime, 1 g, Intravenous, Q12H  Linezolid, 600 mg, Intravenous, Q12H  metroNIDAZOLE, 500 mg, Intravenous, Once           Current listed hospital scheduled medications may not yet reflect those currently placed in orders that are signed and held awaiting patient's arrival to floor.   ---------------------------------------------------------------------------------------------------------------------     Objective     Vital Signs:  Temp:  [97.5 °F (36.4 °C)] 97.5 °F (36.4 °C)  Heart Rate:  [] 90  Resp:  [20-24] 20  BP: (136-155)/(58-77) 136/58      10/30/21  1336   Weight: 68 kg (150 lb)     Body mass index is 25.75 kg/m².  ---------------------------------------------------------------------------------------------------------------------       Physical Exam  Constitutional:       General: She is awake.      Appearance: Normal appearance. She is well-developed.   HENT:      Head: Normocephalic and atraumatic.      Mouth/Throat:      Lips: Pink.      Mouth: Mucous membranes are moist.   Eyes:      General: Lids are normal.      Conjunctiva/sclera:      Right eye: Right conjunctiva is not injected.      Left eye: Left conjunctiva is not injected.   Cardiovascular:      Rate and Rhythm: Normal rate and regular rhythm.      Heart sounds: S1 normal and S2 normal.   Pulmonary:      Effort: No tachypnea or bradypnea.      Breath sounds: Examination of the right-lower field reveals decreased breath sounds. Examination of the left-lower field reveals decreased breath sounds. Decreased breath sounds present. No wheezing, rhonchi or rales.   Abdominal:      General: Bowel sounds are normal.       Palpations: Abdomen is soft.      Tenderness: There is no abdominal tenderness.   Musculoskeletal:      Right lower leg: Edema present.      Left lower leg: Edema present.   Skin:     General: Skin is warm and dry.   Neurological:      Mental Status: She is alert and oriented to person, place, and time.   Psychiatric:         Attention and Perception: Attention normal.         Mood and Affect: Mood normal.         Behavior: Behavior is cooperative.             ---------------------------------------------------------------------------------------------------------------------  EKG:                ---------------------------------------------------------------------------------------------------------------------   Results from last 7 days   Lab Units 10/30/21  1513   CRP mg/dL 3.39*   LACTATE mmol/L 2.1*   WBC 10*3/mm3 8.48   HEMOGLOBIN g/dL 10.4*   HEMATOCRIT % 34.6   MCV fL 90.3   MCHC g/dL 30.1*   PLATELETS 10*3/mm3 245   INR  1.12*     Results from last 7 days   Lab Units 10/30/21  1356   PH, ARTERIAL pH units 7.359   PO2 ART mm Hg 46.6*   PCO2, ARTERIAL mm Hg 37.5   HCO3 ART mmol/L 21.1     Results from last 7 days   Lab Units 10/30/21  1513   SODIUM mmol/L 135*   POTASSIUM mmol/L 5.0   MAGNESIUM mg/dL 2.1   CHLORIDE mmol/L 102   CO2 mmol/L 20.2*   BUN mg/dL 32*   CREATININE mg/dL 3.28*   EGFR IF NONAFRICN AM mL/min/1.73 14*   CALCIUM mg/dL 8.1*   GLUCOSE mg/dL 468*   ALBUMIN g/dL 3.16*   BILIRUBIN mg/dL 0.3   ALK PHOS U/L 104   AST (SGOT) U/L 7   ALT (SGPT) U/L 9   Estimated Creatinine Clearance: 17.7 mL/min (A) (by C-G formula based on SCr of 3.28 mg/dL (H)).  No results found for: AMMONIA  Results from last 7 days   Lab Units 10/30/21  1513   TROPONIN T ng/mL <0.010     Results from last 7 days   Lab Units 10/30/21  1513   PROBNP pg/mL 714.7     Lab Results   Component Value Date    HGBA1C 10.50 (H) 10/09/2021     Lab Results   Component Value Date    TSH 0.397 10/09/2021     No results found for:  PREGTESTUR, PREGSERUM, HCG, HCGQUANT  Pain Management Panel     Pain Management Panel Latest Ref Rng & Units 10/10/2021    CREATININE UR mg/dL 41.4        No results found for: BLOODCX  No results found for: URINECX  No results found for: WOUNDCX  No results found for: STOOLCX      ---------------------------------------------------------------------------------------------------------------------  Imaging Results (Last 7 Days)     Procedure Component Value Units Date/Time    CT Chest Without Contrast Diagnostic [[id number]] Resulted: 10/30/21 1631     Updated: 10/30/21 1646    NM Lung Scan Perfusion Particulate [[id number]] Collected: 10/30/21 1458     Updated: 10/30/21 1501    Narrative:      EXAMINATION: NM LUNG SCAN PERFUSION PARTICULATE-      CLINICAL INDICATION: recent COVID, pleuritic chest pain, worsening SOB,  r/o PE; J96.01-Acute respiratory failure with hypoxia     TECHNIQUE:  3.8 mCi of Tc-99m MAA were administered IV for a perfusion  lung scan. Ventilation could not be performed.     COMPARISON: Chest XRAY performed on same day; perfusion scan 10/09/2021.     FINDINGS: No interval change. No segmental type perfusion defects are  noted. Low suspicion of PE.       Impression:      Low probability of PE.      This report was finalized on 10/30/2021 2:59 PM by Dr. Michael Mojica MD.       XR Chest 2 View [[id number]] Collected: 10/30/21 1457     Updated: 10/30/21 1500    Narrative:      EXAM:    XR Chest, 2 Views     EXAM DATE:    10/30/2021 2:24 PM     CLINICAL HISTORY:    sob; J96.01-Acute respiratory failure with hypoxia     TECHNIQUE:    Frontal and lateral views of the chest.     COMPARISON:    10/09/2021     FINDINGS:    Lungs:  Overall interval worsening of diffuse bilateral airspace  disease which appears partially consolidative and is most consistent  with worsening pneumonia.  Low lung volumes.    Pleural space:  No evidence of pneumothorax.    Heart:  Cardiomegaly.    Mediastinum:  Unremarkable.     Bones/joints:  Unremarkable.       Impression:        Significant interval worsening of bilateral pneumonia.     This report was finalized on 10/30/2021 2:58 PM by Dr. Michael Mojica MD.             Cultures:  No results found for: BLOODCX, URINECX, WOUNDCX, MRSACX, RESPCX, STOOLCX    Last echocardiogram:          I have personally reviewed the above radiology images and read the final radiology report on 10/30/21  ---------------------------------------------------------------------------------------------------------------------  Assessment / Plan     Active Hospital Problems    Diagnosis  POA   • Acute respiratory failure with hypoxia (HCC) [J96.01]  Yes       ASSESSMENT/PLAN:  -Acute hypoxemic respiratory failure likely 2/2 HCAP:   -Severe sepsis 2/2 HCAP, POA, with HR>90, RR>20, and lactate>2:   -Recent COVID-19 dx with PNA:   • Continuous cardiac monitoring.   • Continuous pulse oximetry monitoring.   • Add SLP evaluation.   • Continue IV Cefepime, vancomycin, and Flagyl for coverage of HCAP.   • Hospitalized both here and [facility] with COVID.   • Add respiratory PCR.   • Add MRSA nares.   • Add sputum culture  • Peripheral blood cultures x2 pending at present.   • Monitor vitals per hospital protocol.   • ABG reviewed with pO2 46.6.   • ID consult added.    • Peripheral blood cultures x2 obtained and pending.    • CT chest without contrast pending.     -ANA PAULA on CKD IV:   · Referred to [place] for 2nd opinion regarding transplant evaluation after not listed by [facility] with concern for medical noncompliance  & poor social support.   · Continue follow-up with outpatient Neprhologist Dr. Araiza.   · US renal added.   · Avoid nephrotoxins when possible.   · Repeat AM BMP.     -Hyperglycemia with DM 2, ID:   -Pseudohyponatremia 2/2 hyperglycemia:   Given diabetes mellitus, fingerstick blood glucose monitoring has been ordered AC&HS.   Had 5 units of regular insulin in the ED, glucose increased following.    3 more  units of regular insulin given after return glucose of 510.   Change glucoses to q2 for now. Anion gap WNL. ABG with pH WNL.   Sliding scale insulin has been ordered PRN.   Hemoglobin a1c 10.5 on 10/9/21.   Anion gap WNL in spite of extremely elevated glucoses.    Home DM regimen to be reviewed upon availability.       -Elevated d-dimer:   · VQ scan without known DVT.    · CT chest PE protocol not obtained given ANA PAULA on CKD IV.   · US venous doppler added.   · Reports she believes she was compliant with discharge Eliquis.     ----------  -DVT prophylaxis: Home Eliquis to serve  -Activity: Up with assist  -Expected length of stay: INPATIENT status due to the need for care which can only be reasonably provided in an hospital setting such as aggressive/expedited ancillary services and/or consultation services, the necessity for IV medications, close physician monitoring and/or the possible need for procedures.  In such, I feel patient’s risk for adverse outcomes and need for care warrant INPATIENT evaluation and predict the patient’s care encounter to likely last beyond 2 midnights.  -Disposition: Desires to return home at discharge    High risk secondary to acute hypoxemic respiratory failure         Tonie Maldonado PA-C  10/30/21  17:35 EDT  Pager # [phone]  ---------------------------------------------------------------------------------------------------------------------             Electronically signed by Stephan Bolton MD at 10/30/21 1814          Emergency Department Notes      Michael Puente MD at 10/30/21 1414            [facility]  emergency department encounter        Pt Name: Jessica Bravo  MRN: [id number]  Birthdate 1965  Date of evaluation: 10/30/2021    Chief Complaint   Patient presents with   • Shortness of Breath   • Cough             HISTORY OF PRESENT ILLNESS:   Jessica Bravo is a 56 y.o. female PMH HTN, ESRD on hemodialysis, T2DM, solitary kidney presents for shortness of  breath.  Patient was recently admitted to the hospital with Covid pneumonia.  She was discharged 2 weeks ago on October 15.  She reports progressively worsening shortness of breath since that time.  Endorses significant cough with pleuritic pain upon cough.  Posttussive emesis.  Patient saw her primary care provider yesterday who ordered her home oxygen.  She started on 2 to 3 L nasal cannula last night.  Endorses chills without fever.  No chest pain without cough.  No significant abdominal pain ongoing nausea, vomiting, diarrhea or dysuria.  No headaches lightheadedness dizziness visual changes congestion runny nose.    Patient placed on room air by nursing staff on arrival with oxygen saturation 80%.    Patient was on dialysis 3 times 4-5 years ago.  She currently makes urine is not on dialysis.    No other exacerbating or alleviating factors other than as noted above.  Severity: Severe    PCP: Buck Wallis APRN          REVIEW OF SYSTEMS:     Review of Systems   Constitutional: Positive for chills. Negative for fever.   HENT: Negative for congestion and rhinorrhea.    Respiratory: Positive for cough and shortness of breath.    Cardiovascular: Negative for chest pain (associated with cough).   Gastrointestinal: Negative for abdominal pain, nausea and vomiting (posttussive).   Genitourinary: Negative for dysuria.   Musculoskeletal: Negative for myalgias.   Skin: Negative for rash.   Neurological: Negative for headaches.   Psychiatric/Behavioral: Negative for confusion.       Review of systems otherwise as per HPI.          PREVIOUS HISTORY:         Past Medical History:   Diagnosis Date   • Chronic kidney disease     only has one kidney, [facility]    • ESRD on hemodialysis (HCC)    • Essential hypertension    • Hepatic steatosis    • History of noncompliance with medical treatment    • Pre-transplant evaluation for kidney transplant 03/31/2021    Declined by the [facility]   • Single kidney    • Type 2 diabetes mellitus (HCC)            Past Surgical History:   Procedure Laterality Date   • HYSTERECTOMY             Social History     Socioeconomic History   • Marital status: Single   Tobacco Use   • Smoking status: Never Smoker   • Smokeless tobacco: Never Used   Substance and Sexual Activity   • Alcohol use: No   • Drug use: No   • Sexual activity: Defer           Family History   Problem Relation Age of Onset   • Diabetes Mother    • Hypertension Mother    • Diabetes Father    • Hypertension Father    • Diabetes Sister    • Hypertension Sister    • Diabetes Brother          Current Outpatient Medications   Medication Instructions   • albuterol sulfate  (90 Base) MCG/ACT inhaler 2 puffs, Inhalation, Every 4 Hours PRN   • amLODIPine (NORVASC) 5 mg, Oral, Every 24 Hours Scheduled   • aspirin 81 mg, Oral, Daily   • Eliquis 5 mg, Oral, Every 12 Hours Scheduled   • famotidine (PEPCID) 20 mg, Oral, Nightly PRN   • fluticasone (FLONASE) 50 MCG/ACT nasal spray 1 spray, Nasal, 2 times daily   • halobetasol (ULTRAVATE) 0.05 % cream 1 application, Topical, 2 Times Daily PRN   • hydrOXYzine (ATARAX) 25 mg, Oral, Nightly   • insulin aspart (NOVOLOG) 0-12 Units, Subcutaneous, 3 Times Daily Before Meals, Prior to [facility] Admission, Patient was on: max daily dose 36 units total   • insulin regular (HUMULIN R,NOVOLIN R) 6 Units, Subcutaneous, 2 Times Daily With Meals, Prior to [facility] Admission, Patient was on: 6 units at breakfast and dinner, and 10 units at lunch.   • insulin regular (HUMULIN R,NOVOLIN R) 10 Units, Subcutaneous, Daily With Lunch, Prior to [facility] Admission, Patient was on: 6 units at breakfast and dinner, and 10 units at lunch.   • linaclotide (LINZESS) 145 mcg, Oral, Every Morning Before Breakfast   • montelukast (SINGULAIR) 10 mg, Oral, Nightly   • pantoprazole (PROTONIX) 40 mg, Oral, Daily   • pravastatin (PRAVACHOL) 10 mg, Oral, Daily   • vitamin D  (ERGOCALCIFEROL) 50,000 Units, Oral, Weekly         Allergies:  Bactrim [sulfamethoxazole-trimethoprim], Sulfa antibiotics, Benadryl [diphenhydramine hcl (sleep)], and Penicillins    Medications, allergies and past medical, surgical, family, and social history reviewed.        PHYSICAL EXAM:     Physical Exam  Vitals and nursing note reviewed.   Constitutional:       General: She is not in acute distress.     Appearance: She is ill-appearing. She is not toxic-appearing.   HENT:      Head: Normocephalic and atraumatic.   Eyes:      Extraocular Movements: Extraocular movements intact.      Conjunctiva/sclera: Conjunctivae normal.   Pulmonary:      Effort: Pulmonary effort is normal. No respiratory distress.      Breath sounds: Normal breath sounds. No stridor. No wheezing, rhonchi or rales.   Abdominal:      General: Abdomen is flat. There is no distension.   Musculoskeletal:         General: No deformity. Normal range of motion.      Cervical back: Normal range of motion. No rigidity.   Neurological:      General: No focal deficit present.      Mental Status: She is alert and oriented to person, place, and time.   Psychiatric:         Mood and Affect: Mood normal.         Behavior: Behavior normal.             COMPLETED DIAGNOSTIC STUDIES AND INTERVENTIONS:     Lab Results (last 24 hours)     Procedure Component Value Units Date/Time    Blood Gas, Arterial With Co-Ox [[id number]]  (Abnormal) Collected: 10/30/21 1356    Specimen: Arterial Blood Updated: 10/30/21 1402     Site Left Brachial     Robert's Test N/A     pH, Arterial 7.359 pH units      pCO2, Arterial 37.5 mm Hg      pO2, Arterial 46.6 mm Hg      Comment: 85 Value below critical limit        HCO3, Arterial 21.1 mmol/L      Base Excess, Arterial -3.9 mmol/L      O2 Saturation, Arterial 82.5 %      Comment: 84 Value below reference range        Hemoglobin, Blood Gas 11.0 g/dL      Comment: 84 Value below reference range        Hematocrit, Blood Gas 33.6 %       Comment: 84 Value below reference range        Oxyhemoglobin 80.3 %      Comment: 84 Value below reference range        Methemoglobin 0.20 %      Carboxyhemoglobin 2.5 %      A-a Gradiant 51.4 mmHg      CO2 Content 22.3 mmol/L      Temperature 0.0 C      Barometric Pressure for Blood Gas 715 mmHg      Modality Room Air     FIO2 21 %      Ventilator Mode NA     Note --     Notified Who DR. LYON/RN     Notified By [id number]     Notified Time 10/30/2021 14:05     Collected by [id number]     Comment: Meter: [id number]     :  [id number]        pH, Temp Corrected --     pCO2, Temperature Corrected --     pO2, Temperature Corrected --    CBC & Differential [[id number]]  (Abnormal) Collected: 10/30/21 1513    Specimen: Blood Updated: 10/30/21 1529    Narrative:      The following orders were created for panel order CBC & Differential.  Procedure                               Abnormality         Status                     ---------                               -----------         ------                     CBC Auto Differential[[id number]]        Abnormal            Final result                 Please view results for these tests on the individual orders.    Comprehensive Metabolic Panel [[id number]]  (Abnormal) Collected: 10/30/21 1513    Specimen: Blood Updated: 10/30/21 1603     Glucose 468 mg/dL      BUN 32 mg/dL      Creatinine 3.28 mg/dL      Sodium 135 mmol/L      Potassium 5.0 mmol/L      Chloride 102 mmol/L      CO2 20.2 mmol/L      Calcium 8.1 mg/dL      Total Protein 6.8 g/dL      Albumin 3.16 g/dL      ALT (SGPT) 9 U/L      AST (SGOT) 7 U/L      Alkaline Phosphatase 104 U/L      Total Bilirubin 0.3 mg/dL      eGFR Non African Amer 14 mL/min/1.73      Comment: <15 Indicative of kidney failure.        eGFR   Amer --     Comment: <15 Indicative of kidney failure.        Globulin 3.6 gm/dL      A/G Ratio 0.9 g/dL      BUN/Creatinine Ratio 9.8     Anion Gap 12.8 mmol/L     Narrative:      GFR Normal  >60  Chronic Kidney Disease <60  Kidney Failure <15      Protime-INR [[id number]]  (Abnormal) Collected: 10/30/21 1513    Specimen: Blood Updated: 10/30/21 1551     Protime 14.8 Seconds      INR 1.12    Narrative:      Suggested INR therapeutic range for stable oral anticoagulant therapy:    Low Intensity therapy:   1.5-2.0  Moderate Intensity therapy:   2.0-3.0  High Intensity therapy:   2.5-4.0    aPTT [[id number]]  (Normal) Collected: 10/30/21 1513    Specimen: Blood Updated: 10/30/21 1551     PTT 33.2 seconds     Narrative:      PTT Heparin Therapeutic Range:  59 - 95 seconds      BNP [[id number]]  (Normal) Collected: 10/30/21 1513    Specimen: Blood Updated: 10/30/21 1549     proBNP 714.7 pg/mL     Narrative:      Among patients with dyspnea, NT-proBNP is highly sensitive for the detection of acute congestive heart failure. In addition NT-proBNP of <300 pg/ml effectively rules out acute congestive heart failure with 99% negative predictive value.    Results may be falsely decreased if patient taking Biotin.      Troponin [[id number]]  (Normal) Collected: 10/30/21 1513    Specimen: Blood Updated: 10/30/21 1552     Troponin T <0.010 ng/mL     Narrative:      Troponin T Reference Range:  <= 0.03 ng/mL-   Negative for AMI  >0.03 ng/mL-     Abnormal for myocardial necrosis.  Clinicians would have to utilize clinical acumen, EKG, Troponin and serial changes to determine if it is an Acute Myocardial Infarction or myocardial injury due to an underlying chronic condition.       Results may be falsely decreased if patient taking Biotin.      C-reactive Protein [[id number]]  (Abnormal) Collected: 10/30/21 1513    Specimen: Blood Updated: 10/30/21 1552     C-Reactive Protein 3.39 mg/dL     Lactic Acid, Plasma [[id number]]  (Abnormal) Collected: 10/30/21 1513    Specimen: Blood Updated: 10/30/21 1603     Lactate 2.1 mmol/L     Magnesium [[id number]]  (Normal) Collected: 10/30/21 1513    Specimen: Blood Updated: 10/30/21 1552      Magnesium 2.1 mg/dL     CBC Auto Differential [[id number]]  (Abnormal) Collected: 10/30/21 1513    Specimen: Blood Updated: 10/30/21 1529     WBC 8.48 10*3/mm3      RBC 3.83 10*6/mm3      Hemoglobin 10.4 g/dL      Hematocrit 34.6 %      MCV 90.3 fL      MCH 27.2 pg      MCHC 30.1 g/dL      RDW 16.7 %      RDW-SD 55.2 fl      MPV 9.8 fL      Platelets 245 10*3/mm3      Neutrophil % 83.5 %      Lymphocyte % 9.1 %      Monocyte % 4.7 %      Eosinophil % 1.8 %      Basophil % 0.2 %      Immature Grans % 0.7 %      Neutrophils, Absolute 7.08 10*3/mm3      Lymphocytes, Absolute 0.77 10*3/mm3      Monocytes, Absolute 0.40 10*3/mm3      Eosinophils, Absolute 0.15 10*3/mm3      Basophils, Absolute 0.02 10*3/mm3      Immature Grans, Absolute 0.06 10*3/mm3      nRBC 0.0 /100 WBC     D-dimer, Quantitative [[id number]]  (Abnormal) Collected: 10/30/21 1513    Specimen: Blood Updated: 10/30/21 1551     D-Dimer, Quantitative 0.90 MCGFEU/mL     Narrative:      d-Dimer assay result is to be used in conjunction with a non-high clinical pretest probability (PTP) assessment tool to exclude PE and aid in diagnosis of VTE with cutoff of 0.5 MCGFEU/mL.    Blood Culture - Blood, Arm, Left [[id number]] Collected: 10/30/21 1514    Specimen: Blood from Arm, Left Updated: 10/30/21 1556    Blood Culture - Blood, Arm, Left [[id number]] Collected: 10/30/21 1514    Specimen: Blood from Arm, Left Updated: 10/30/21 1528            XR Chest 2 View   Final Result     Significant interval worsening of bilateral pneumonia.       This report was finalized on 10/30/2021 2:58 PM by Dr. Michael Mojica MD.          NM Lung Scan Perfusion Particulate   Final Result   Low probability of PE.        This report was finalized on 10/30/2021 2:59 PM by Dr. Michael Mojica MD.          CT Chest Without Contrast Diagnostic    (Results Pending)         New Medications Ordered This Visit   Medications   • HYDROcodone-acetaminophen (HYCET) 7.5-325 MG/15ML solution 10 mL    • technetium albumin aggregated (MAA) solution 1 dose     Order Specific Question:   Millicuries:     Answer:   3.8   • Linezolid (ZYVOX) 600 mg 300 mL     Order Specific Question:   Reason for Therapy     Answer:   Empiric MRSA pneumonia   • sodium chloride 0.9 % bolus 500 mL   • insulin regular (humuLIN R,novoLIN R) injection 5 Units   • cefepime 1 gm IVPB in 100 mL NS (VTB)   • metroNIDAZOLE (FLAGYL) 500 mg/100mL IVPB         Procedures            MEDICAL DECISION-MAKING AND ED COURSE:     ED Course as of 10/30/21 1628   Sat Oct 30, 2021   1442 EKG at 1423 hrs., no sinus rhythm, 99 bpm, , QRS 68, QTc 479, single PAC noted.  Significant artifact obscuring baseline.  Poor R wave progression however no significant ST deviation or T wave abnormalities concerning for acute ischemia. [KP]   1530 MDM: 56-year-old female discharged 2 weeks ago after presentation for acute hypoxic respiratory failure with Covid.  Now with worsening cough, chest pain associated with cough.  Patient is ESRD, unable to obtain CT PE scan.  Nuc med VQ scan low risk PE.  CXR with worsening diffuse bilateral airspace disease concerning for pneumonia.  Will treat for healthcare acquired pneumonia given recent hospitalization, started on linezolid and cefepime. [KP]   1532 WBC: 8.48 [KP]   1532 Hemoglobin(!): 10.4 [KP]   1532 Platelets: 245 [KP]   1532 O2 Saturation, Arterial(!): 82.5 [KP]   1603 D-Dimer, Quant(!!): 0.90 [KP]   1603 Lactate(!!): 2.1 [KP]   1603 Magnesium: 2.1 [KP]   1603 C-Reactive Protein(!): 3.39 [KP]   1603 Troponin T: <0.010 [KP]   1603 proBNP: 714.7 [KP]   1603 Glucose(!!): 468 [KP]   1603 Creatinine(!): 3.28 [KP]   1603 CO2(!): 20.2 [KP]   1604 No DKA.  Glucose not elevated enough for HHS and no such associated symptoms.  We will treat with insulin 5 units and small 500 mL fluid bolus given renal disease.  No prior creatinine for comparison. [KP]   1619 Care everywhere a creatinine in January 2021 2.8.  Likely ANA PAULA.  [KP]   1627 Admit to Dr. Bolton.  Metronidazole and CT noncontrast ordered. [KP]      ED Course User Index  [KP] Michael Puente MD       ?      FINAL IMPRESSION:       1. Acute respiratory failure with hypoxia (HCC)    2. Pneumonia of both lungs due to infectious organism, unspecified part of lung    3. AAN PAULA (acute kidney injury) (HCC)         The complaints listed here are new problems to this examiner.      FOLLOW-UP  No follow-up provider specified.    DISPOSITION  ED Disposition     ED Disposition Condition Comment    Decision to Admit                  This care is provided during an unprecedented national emergency due to the Novel Coronavirus (COVID-19). COVID-19 infections and transmission risks place heavy strains on healthcare resources. As this pandemic evolves, the Hospital and providers strive to respond fluidly, to remain operational, and to provide care relative to available resources and information. Outcomes are unpredictable and treatments are without well-defined guidelines. Further, the impact of COVID-19 on all aspects of emergency care, including the impact to patients seeking care for reasons other than COVID-19, is unavoidable during this national emergency.    This note was dictated using a [id number] tool. Occasional wrong-word or 'sound-a-like' substitutions may have occurred due to the inherent limitations of voice recognition software. ?Read the chart carefully and recognize, using context, where substitutions have occurred.    Michael Puente MD  16:28 EDT  10/30/2021             Michael Puente MD  10/30/21 1628      Electronically signed by Michael Puente MD at 10/30/21 1628     Jeny Recio, RN at 10/30/21 1642        Attempted to call report to PCU and they stated that the nurse would call back.      Jeny Recio, RN  10/30/21 1643      Electronically signed by Jney Recio, RN at 10/30/21 1643     Jeny Recio, RN at 10/30/21 1655        Pt  at this  time, Dr. Puente made aware with no new orders placed.                    Jeny Recio RN  10/30/21 1701      Electronically signed by Jeny Recio RN at 10/30/21 1701     Jeny Recio RN at 10/30/21 1708        Attempted to call report to Pamela and she stated that she would call back in 5min for report.      Jeny Recio RN  10/30/21 1709      Electronically signed by Jeny Recio RN at 10/30/21 1709     Jeny Recio RN at 10/30/21 1715        JOHN Maldonado made aware of BG being 510. No new orders placed at this time and SHAWNA Santiago made aware.      Jeny Recio RN  10/30/21 1718      Electronically signed by Jeny Recio RN at 10/30/21 1718

## 2021-11-01 NOTE — CASE MANAGEMENT/SOCIAL WORK
Case Management/Social Work    Patient Name:  Jessica Bravo  YOB: 1965  MRN: [id number]  Admit Date:  10/30/2021        SS to follow up on 11/2/21.       Electronically signed by:  KAYLEIGH Davies  11/01/21 17:13 EDT

## 2021-11-01 NOTE — PROGRESS NOTES
[facility] HOSPITALIST PROGRESS NOTE     Patient Identification:  Name:  Jessica Bravo  Age:  56 y.o.  Sex:  female  :  1965  MRN:  [id number]  Visit Number:  [id number]  ROOM: [address]     Primary Care Provider:  Buck Wallis APRN    Length of stay in inpatient status:  2    Subjective     Chief Compliant:    Chief Complaint   Patient presents with   • Shortness of Breath   • Cough       History of Presenting Illness:    Patient seen in follow-up for acute hypoxic respiratory failure. Still complains of persistent dyspnea, with a dry nonproductive cough. Does not feel any better today.  Renal function about the same.  Hemodynamically stable, 93% on 3 L.  No adverse events noted by nursing.    Objective     Current Hospital Meds:amLODIPine, 5 mg, Oral, Q24H  apixaban, 5 mg, Oral, Q12H  aspirin, 81 mg, Oral, Daily  atorvastatin, 10 mg, Oral, Daily  cefepime, 2 g, Intravenous, Q24H  cholecalciferol, 50,000 Units, Oral, Weekly  fluticasone, 1 spray, Nasal, BID  hydrOXYzine, 25 mg, Oral, Nightly  insulin aspart, 0-14 Units, Subcutaneous, TID AC  insulin detemir, 5 Units, Subcutaneous, Nightly  linaclotide, 145 mcg, Oral, QAM AC  montelukast, 10 mg, Oral, Nightly  pantoprazole, 40 mg, Oral, Daily  sodium chloride, 10 mL, Intravenous, Q12H  sodium chloride, 10 mL, Intravenous, Q12H  Vancomycin Pharmacy Intermittent Dosing, , Does not apply, Daily         Current Antimicrobial Therapy:  Anti-Infectives (From admission, onward)    Ordered     Dose/Rate Route Frequency Start Stop    10/30/21 1807  cefepime 2 gm IVPB in 100 ml NS (VTB)        Ordering Provider: Stephan Bolton MD    2 g  over 4 Hours Intravenous Every 24 Hours 10/31/21 1700 21 1659    10/31/21 1417  vancomycin 1 g/250 mL 0.9% NS (vial-mate)        Ordering Provider: Stephan Bolton MD    1,000 mg  over 60 Minutes Intravenous Once 10/31/21 1600 10/31/21 1654    10/30/21 1813  vancomycin 1500 mg/500 mL 0.9% NS IVPB ([facility])         Ordering Provider: Stephan Bolton MD    20 mg/kg × 75.2 kg  over 90 Minutes Intravenous Once 10/30/21 1900 10/30/21 2107    10/30/21 1813  Vancomycin Pharmacy Intermittent Dosing        Ordering Provider: Stephan Bolton MD     Does not apply Daily 10/30/21 1900 11/06/21 0859    10/30/21 1627  metroNIDAZOLE (FLAGYL) 500 mg/100mL IVPB        Ordering Provider: Michael Puente MD    500 mg  over 30 Minutes Intravenous Once 10/30/21 1629 10/30/21 1758        Current Diuretic Therapy:  Diuretics (From admission, onward)    None        ----------------------------------------------------------------------------------------------------------------------  Vital Signs:  Temp:  [98.1 °F (36.7 °C)-98.8 °F (37.1 °C)] 98.1 °F (36.7 °C)  Heart Rate:  [] 87  Resp:  [18-28] 24  BP: (120-150)/(59-87) 142/69  SpO2:  [89 %-95 %] 93 %  on  Flow (L/min):  [3] 3;   Device (Oxygen Therapy): nasal cannula  Body mass index is 28.27 kg/m².    Wt Readings from Last 3 Encounters:   11/01/21 74.8 kg (164 lb 12.8 oz)   10/10/21 72.6 kg (160 lb)   02/05/20 78 kg (172 lb)     Intake & Output (last 3 days)       10/29 0701  10/30 0700 10/30 0701  10/31 0700 10/31 0701  11/01 0700 11/01 0701 11/02 0700    P.O.   580     I.V. (mL/kg)   250.2 (3.3)     IV Piggyback  1100      Total Intake(mL/kg)  1100 (14.6) 830.2 (11.1)     Urine (mL/kg/hr)   1350 (0.8)     Total Output   1350     Net  +1100 -519.8                 Diet Regular; Cardiac, Consistent Carbohydrate, Renal  ----------------------------------------------------------------------------------------------------------------------  Physical exam:  Constitutional: older female., appears to feel unwell, no acute resp distress  HENT:  Head:  Normocephalic and atraumatic.  Mouth:  Moist mucous membranes.    Eyes:  Conjunctivae and EOM are normal. No scleral icterus.   Cardiovascular:  Normal rate, regular rhythm and normal heart sounds with no murmur. No JVD.   Pulmonary/Chest:  No  respiratory distress, no wheezes, no crackles, with normal breath sounds and good air movement. Unlabored. No accessory muscle use.  Abdominal:  Soft. No distension and no tenderness.  Bowel sounds present. No rebound or guarding.   Musculoskeletal:  No tenderness, and no deformity.  No red or swollen joints anywhere.    Neurological:  Alert and oriented to person, place, and time.  No cranial nerve deficit.   Nonfocal.   Skin:  Skin is warm and dry. No rash noted. No pallor.   Peripheral vascular:  No clubbing, no cyanosis, no edema. Pedal and tibial pulses 2/4 bilaterally.   ----------------------------------------------------------------------------------------------------------------------  Results from last 7 days   Lab Units 11/01/21  0124 10/31/21  0514 10/30/21  1833 10/30/21  1513   CRP mg/dL 5.96* 4.02*  --  3.39*   LACTATE mmol/L  --  0.8 2.4* 2.1*   WBC 10*3/mm3 8.02 9.03  --  8.48   HEMOGLOBIN g/dL 9.7* 9.7*  --  10.4*   HEMATOCRIT % 31.7* 31.6*  --  34.6   MCV fL 90.8 89.5  --  90.3   MCHC g/dL 30.6* 30.7*  --  30.1*   PLATELETS 10*3/mm3 248 237  --  245   INR   --   --   --  1.12*     Results from last 7 days   Lab Units 10/30/21  1356   PH, ARTERIAL pH units 7.359   PO2 ART mm Hg 46.6*   PCO2, ARTERIAL mm Hg 37.5   HCO3 ART mmol/L 21.1     Results from last 7 days   Lab Units 11/01/21  0124 10/31/21  0514 10/30/21  1513   SODIUM mmol/L 135* 137 135*   POTASSIUM mmol/L 5.2 4.6 5.0   MAGNESIUM mg/dL  --   --  2.1   CHLORIDE mmol/L 108* 108* 102   CO2 mmol/L 19.7* 20.2* 20.2*   BUN mg/dL 32* 28* 32*   CREATININE mg/dL 3.42* 3.14* 3.28*   EGFR IF NONAFRICN AM mL/min/1.73 14* 15* 14*   CALCIUM mg/dL 7.9* 7.7* 8.1*   GLUCOSE mg/dL 228* 78 468*   ALBUMIN g/dL 2.47* 2.69* 3.16*   BILIRUBIN mg/dL 0.3 0.4 0.3   ALK PHOS U/L 91 92 104   AST (SGOT) U/L 7 7 7   ALT (SGPT) U/L 6 6 9   Estimated Creatinine Clearance: 17.7 mL/min (A) (by C-G formula based on SCr of 3.42 mg/dL (H)).  No results found for:  AMMONIA  Results from last 7 days   Lab Units 10/30/21  1513   TROPONIN T ng/mL <0.010     Results from last 7 days   Lab Units 10/30/21  1513   PROBNP pg/mL 714.7         Glucose   Date/Time Value Ref Range Status   11/01/2021 0622 161 (H) 70 - 130 mg/dL Final     Comment:     Meter: [id number] : [id number] Rachel Hutsonier   10/31/2021 2239 243 (H) 70 - 130 mg/dL Final     Comment:     Meter: [id number] : [id number] [facility]   10/31/2021 1645 141 (H) 70 - 130 mg/dL Final     Comment:     Meter: [id number] : [id number] Tami Mallicoat   10/31/2021 1102 86 70 - 130 mg/dL Final     Comment:     Meter: [id number] : [id number] Tami Mallicoat   10/31/2021 0539 75 70 - 130 mg/dL Final     Comment:     Meter: [id number] : [id number] TODD HEIDI   10/30/2021 2242 111 70 - 130 mg/dL Final     Comment:     Meter: [id number] : [id number] Carrillo Merari   10/30/2021 1909 353 (H) 70 - 130 mg/dL Final     Comment:     Meter: [id number] : [id number] Carrillo Merari   10/30/2021 1654 510 (C) 70 - 130 mg/dL Final     Comment:     RN Notified Meter: [id number] : [id number] [facility]     Lab Results   Component Value Date    TSH 0.397 10/09/2021     No results found for: PREGTESTUR, PREGSERUM, HCG, HCGQUANT  Pain Management Panel     Pain Management Panel Latest Ref Rng & Units 10/10/2021    CREATININE UR mg/dL 41.4        Brief Urine Lab Results  (Last result in the past 365 days)      Color   Clarity   Blood   Leuk Est   Nitrite   Protein   CREAT   Urine HCG        10/10/21 1527             41.4             Blood Culture   Date Value Ref Range Status   10/30/2021 No growth at 24 hours  Preliminary   10/30/2021 No growth at 24 hours  Preliminary     No results found for: URINECX  No results found for: WOUNDCX  No results found for: STOOLCX  No results found for: RESPCX  No results found for: AFBCX  Results from last 7 days   Lab Units 11/01/21  0124 10/31/21  0514 10/30/21  1839  10/30/21  1513   LACTATE mmol/L  --  0.8 2.4* 2.1*   CRP mg/dL 5.96* 4.02*  --  3.39*       I have personally looked at the labs and they are summarized above.  ----------------------------------------------------------------------------------------------------------------------  Detailed radiology reports for the last 24 hours:  Imaging Results (Last 24 Hours)     Procedure Component Value Units Date/Time    US Renal Bilateral [[id number]] Collected: 10/31/21 1218     Updated: 10/31/21 1220    Narrative:      UNILATERAL LEFT RENAL ULTRASOUND, 10/31/2021    HISTORY:  56-year-old female hospital inpatient with acute respiratory failure, COVID-19 pneumonia. Acute kidney injury is noted. The technologist reports the patient has a solitary left kidney. Remote CT abdomen/pelvis in 2015 reportedly showed severe chronic  right renal atrophy.    TECHNIQUE:  Grayscale ultrasound imaging of the left kidney and urinary bladder was performed.    FINDINGS:  The left kidney is normal in size and ultrasound appearance, although somewhat poorly seen due to technical limitations. There is no hydronephrosis to suggest urinary obstruction on the left.    The urinary bladder is not imaged and may be empty. Reportedly atrophic right kidney not visible by ultrasound today.    Right renal length: Not seen.  Left renal length: 14.3 cm.      Impression:      1.  Negative left unilateral renal ultrasound examination. No evidence of urinary obstruction.  2.  No right kidney is visualized. It may be severely atrophic or absent correlate clinically.    Signer Name: Paulie Jensen MD   Signed: 10/31/2021 12:18 PM   Workstation Name: [id number]    [facility] Venous Doppler Lower Extremity Bilateral (duplex) [[id number]] Collected: 10/31/21 1211     Updated: 10/31/21 1213    Narrative:      VENOUS DOPPLER ULTRASOUND, BILATERAL LOWER EXTREMITY, 10/31/2021    HISTORY:   56-year-old female hospital inpatient with  acute respiratory failure, pneumonia. Recent COVID-19 illness. D-dimer is elevated.    TECHNIQUE:   Venous Doppler ultrasound examination of both legs performed using grey-scale, spectral Doppler, and color flow Doppler ultrasound imaging. The protocol does not include the greater saphenous vein beyond the saphenofemoral junction or the anterior tibial  vein.    FINDINGS:   The examination is negative.  There is no evidence of deep venous thrombosis from the groin to the lower calf. Saphenofemoral junctions are patent.      Impression:      Negative examination.  No evidence of right or left lower extremity DVT.    Signer Name: Paulie Jensen MD   Signed: 10/31/2021 12:11 PM   Workstation Name: [id number]    [facility] [place]        Assessment & Plan      Patient is a 56-year-old female with history significant for CKD stage IV presented with worsening shortness of breath and CAP.    #Sepsis secondary to CAP vs   #Acute hypoxemic respiratory failure due to CAP vs   #Recent COVID-19 dx  --Patient remains hemodynamically stable.  93% on 3 L nasal cannula.  Does not wear home O2.  Recently hospitalized at this facility in [facility] with Covid.  --Admission labs notable for CRP 3.4, WBC 8K, ABG 7.35/37/46 on room air, BUN/creatinine 32/3.2  --CT chest without noted extensive bilateral groundglass opacities with progressive consolidation in multiple areas  --Echocardiogram with preserved ejection fraction, no diastolic dysfunction  --Legionella, respiratory panel, strep pneumo, MRSA nasal swab negative  --given comorbid renal involvement, added anca and complement levels, anti GBM, jewel profile, anti PLAR2  --Given her persistent symptoms and characteristics of chest CT, concern for cryptogenic organizing pneumonia and recent bacterial and viral infection.  I think would be unlikely this is persistent COVID-19 disease given her low O2 requirements.  She has been afebrile, no leukocytosis and  minimally elevated inflammatory markers therefore infectious process I feel like is less likely especially given multiple areas of consolidation noted on the initial CT.  --continue po prednisone for now  --Continue IV cefepime, vancomycin per ID recommendations  --Pulmonology consulted for evaluation of possible bronchoscopy and high-dose corticosteroids, appreciate recommendations     #ANA PAULA on CKD IV  #Metabolic acidosis  #Borderline hyperkalemia  #Solitary kidney  --baseline renal function Cr 2.3.  Had previously dialyzed 3x week. Previously referred to [facility] for 2nd opinion regarding transplant evaluation after not listed by [facility] with concern for medical noncompliance  & poor social support.   --Right kidney severely atrophic due to diabetic nephropathy  --UA with 4 g proteinuria, hematuria. CK wnl.   --BUN/creatinine 32/3.4, potassium 5.2, bicarb 20, GFR 15  --Renal ultrasound of left kidney unremarkable  --will add p.o. sodium bicarb, target serum bicarb 22  --Given pulmonary involvement, concern for AI process, ANCA and work-up noted per above  --Renally dose medications to GFR less than 15, avoid nephrotoxins  --Nephrology following, appreciate recs     #Hyperglycemia with type 2 DM  --A1c 10.9%. continue low dose basal and SSI achs w/ accuchecks      #Elevated d-dimer  --d-dimer 0.90. VQ scan low probability of PE.  --CT chest PE protocol not obtained given ANA PAULA on CKD IV.   --US venous doppler neg for DVT  --continue home Eliquis    #HLD, continue ASA 81, statin  #HTN, norvasc    #Medical Non-Adherence  --Given patient's reported choices and actions to not adhere to medical treatments and recommendations, this will drastically affect their short and long term morbidity and mortality.      CHECKLIST:  Abx: vancomycin, cefepime  Steroids: po pred  VTE: eliquis  GI ppx: ppi  Diet: diabetic, renal  Code: CPR, full  Dispo: Patient is high risk due to continued hypoxia requiring supplemental oxygen with  concerns for bacterial pneumonia versus cryptogenic organizing pneumonia, plan for bronchoscopy in a.m. anticipate greater than 2 midnight stay.  Disposition expected Home when medically stable.    Roland Alberto DO  [facility]  11/01/21  08:08 EDT

## 2021-11-01 NOTE — PROGRESS NOTES
Nephrology Progress Note      Subjective     Patient feels better today, no chest pain or shortness of breath    Objective       Vital signs :     Temp:  [98.1 °F (36.7 °C)-98.8 °F (37.1 °C)] 98.1 °F (36.7 °C)  Heart Rate:  [] 89  Resp:  [23-28] 24  BP: (120-150)/(59-87) 133/65      Intake/Output Summary (Last 24 hours) at 11/1/2021 1030  Last data filed at 11/1/2021 0817  Gross per 24 hour   Intake 725.16 ml   Output 1050 ml   Net -324.84 ml       Physical Exam:    General Appearance : not in acute distress  Lungs : clear to auscultation, respirations regular  Heart :  regular rhythm & normal rate, normal S1, S2 and no murmur, no rub  Abdomen : normal bowel sounds, no masses, no hepatomegaly, no splenomegaly, soft non-tender and no guarding  Extremities : moves extremities well, no edema, no cyanosis and no redness  Neurologic :   orientated to person, place, time and situation, Grossly no focal deficits      Laboratory Data :     Albumin Albumin   Date Value Ref Range Status   11/01/2021 2.47 (L) 3.50 - 5.20 g/dL Final   10/31/2021 2.69 (L) 3.50 - 5.20 g/dL Final   10/30/2021 3.16 (L) 3.50 - 5.20 g/dL Final      Magnesium Magnesium   Date Value Ref Range Status   10/30/2021 2.1 1.6 - 2.6 mg/dL Final          PTH               No results found for: PTH    CBC and coagulation:  Results from last 7 days   Lab Units 11/01/21  0124 10/31/21  0514 10/30/21  1833 10/30/21  1513   LACTATE mmol/L  --  0.8 2.4* 2.1*   CRP mg/dL 5.96* 4.02*  --  3.39*   WBC 10*3/mm3 8.02 9.03  --  8.48   HEMOGLOBIN g/dL 9.7* 9.7*  --  10.4*   HEMATOCRIT % 31.7* 31.6*  --  34.6   MCV fL 90.8 89.5  --  90.3   MCHC g/dL 30.6* 30.7*  --  30.1*   PLATELETS 10*3/mm3 248 237  --  245   INR   --   --   --  1.12*   D DIMER QUANT MCGFEU/mL  --   --   --  0.90*     Acid/base balance:  Results from last 7 days   Lab Units 10/30/21  1356   PH, ARTERIAL pH units 7.359   PO2 ART mm Hg 46.6*   PCO2, ARTERIAL mm Hg 37.5   HCO3 ART mmol/L 21.1     Renal  and electrolytes:  Results from last 7 days   Lab Units 11/01/21  0124 10/31/21  0514 10/30/21  1513   SODIUM mmol/L 135* 137 135*   POTASSIUM mmol/L 5.2 4.6 5.0   MAGNESIUM mg/dL  --   --  2.1   CHLORIDE mmol/L 108* 108* 102   CO2 mmol/L 19.7* 20.2* 20.2*   BUN mg/dL 32* 28* 32*   CREATININE mg/dL 3.42* 3.14* 3.28*   EGFR IF NONAFRICN AM mL/min/1.73 14* 15* 14*   CALCIUM mg/dL 7.9* 7.7* 8.1*     Estimated Creatinine Clearance: 17.7 mL/min (A) (by C-G formula based on SCr of 3.42 mg/dL (H)).    Liver and pancreatic function:  Results from last 7 days   Lab Units 11/01/21  0124 10/31/21  0514 10/30/21  1513   ALBUMIN g/dL 2.47* 2.69* 3.16*   BILIRUBIN mg/dL 0.3 0.4 0.3   ALK PHOS U/L 91 92 104   AST (SGOT) U/L 7 7 7   ALT (SGPT) U/L 6 6 9         Cardiac:  Results from last 7 days   Lab Units 10/30/21  1513   PROBNP pg/mL 714.7     Liver and pancreatic function:  Results from last 7 days   Lab Units 11/01/21  0124 10/31/21  0514 10/30/21  1513   ALBUMIN g/dL 2.47* 2.69* 3.16*   BILIRUBIN mg/dL 0.3 0.4 0.3   ALK PHOS U/L 91 92 104   AST (SGOT) U/L 7 7 7   ALT (SGPT) U/L 6 6 9       Medications :     amLODIPine, 5 mg, Oral, Q24H  apixaban, 5 mg, Oral, Q12H  aspirin, 81 mg, Oral, Daily  atorvastatin, 10 mg, Oral, Daily  cefepime, 2 g, Intravenous, Q24H  cholecalciferol, 50,000 Units, Oral, Weekly  fluticasone, 1 spray, Nasal, BID  hydrOXYzine, 25 mg, Oral, Nightly  insulin aspart, 0-14 Units, Subcutaneous, TID AC  insulin detemir, 5 Units, Subcutaneous, Nightly  linaclotide, 145 mcg, Oral, QAM AC  montelukast, 10 mg, Oral, Nightly  pantoprazole, 40 mg, Oral, Daily  predniSONE, 40 mg, Oral, Daily With Breakfast  sodium chloride, 10 mL, Intravenous, Q12H  sodium chloride, 10 mL, Intravenous, Q12H  Vancomycin Pharmacy Intermittent Dosing, , Does not apply, Daily             Assessment/Plan     1-Acute kidney injury on chronic kidney disease stage IV:   2-Hyponatremia, improving.  3-acute  Hypoxic respiratory failure due to  pneumonia  4.Essential hypertension  5-metabolic acidosis  6-DM  7. Nephrotic range proteinuria likely due to DKD    GFR essentially unchanged, will continue on IVF. Recommend switching vanc to daptomycin     ANA PAULA on CKD stage IV likely pre renal azotemia   Follows Dr. Araiza and was on dialysis for 3 times   4G proteinuria, no significant hematuria previously  During last hospitalization, Cr improved to 2.3, now admitted with 3.2.   -1/2NS with sodium bicarb 75meq/L @ 100mlh      Eusebio Duarte MD  11/01/21  10:30 EDT

## 2021-11-01 NOTE — PLAN OF CARE
Problem: Adult Inpatient Plan of Care  Goal: Plan of Care Review  Outcome: Ongoing, Progressing  Flowsheets  Taken 11/1/2021 0438 by Rachel Lopez RN  Progress: no change  Outcome Summary: VSS. Pt remains on 3L NC. PRN medication given for cough. Pt has stayed asleep most of shift. Pt has no complaints at this time. Will continue to monitor.  Taken 10/31/2021 0152 by Merari Carrillo RN  Plan of Care Reviewed With: patient     Problem: Adult Inpatient Plan of Care  Goal: Absence of Hospital-Acquired Illness or Injury  Intervention: Identify and Manage Fall Risk  Recent Flowsheet Documentation  Taken 11/1/2021 0300 by Rachel Lopez RN  Safety Promotion/Fall Prevention:   activity supervised   assistive device/personal items within reach   clutter free environment maintained   fall prevention program maintained   safety round/check completed  Taken 11/1/2021 0155 by Rachel Lopez RN  Safety Promotion/Fall Prevention:   activity supervised   assistive device/personal items within reach   clutter free environment maintained   fall prevention program maintained   safety round/check completed  Taken 11/1/2021 0100 by Rachel Lopez RN  Safety Promotion/Fall Prevention:   activity supervised   assistive device/personal items within reach   fall prevention program maintained   safety round/check completed  Taken 10/31/2021 2300 by Rachel Lopez RN  Safety Promotion/Fall Prevention:   activity supervised   assistive device/personal items within reach   clutter free environment maintained   fall prevention program maintained   safety round/check completed  Taken 10/31/2021 2100 by Rachel Lopez RN  Safety Promotion/Fall Prevention:   activity supervised   assistive device/personal items within reach   clutter free environment maintained   fall prevention program maintained   safety round/check completed  Taken 10/31/2021 2040 by Rachel Lopez RN  Safety Promotion/Fall Prevention:   activity  supervised   assistive device/personal items within reach   clutter free environment maintained   fall prevention program maintained   safety round/check completed  Taken 10/31/2021 1900 by Rachel Lopez RN  Safety Promotion/Fall Prevention:   activity supervised   assistive device/personal items within reach   clutter free environment maintained   fall prevention program maintained   safety round/check completed   Goal Outcome Evaluation:           Progress: no change  Outcome Summary: VSS. Pt remains on 3L NC. PRN medication given for cough. Pt has stayed asleep most of shift. Pt has no complaints at this time. Will continue to monitor.

## 2021-11-01 NOTE — PROGRESS NOTES
PROGRESS NOTE         Patient Identification:  Name:  Jessica Bravo  Age:  56 y.o.  Sex:  female  :  1965  MRN:  [id number]  Visit Number:  [id number]  Primary Care Provider:  Buck Wallis APRN         LOS: 2 days       ----------------------------------------------------------------------------------------------------------------------  Subjective       Chief Complaints:    Shortness of Breath and Cough        Interval History:      Patient resting in bed this morning.  Reports she feels some better today.  Continues on 3 L nasal cannula with no apparent distress.  Reports persistent cough with clear sputum.  WBC normal.  CRP slightly worsened at 5.96.    Review of Systems:    Constitutional: no fever, chills and night sweats. No appetite change or unexpected weight change. No fatigue.  Eyes: no eye drainage, itching or redness.  HEENT: no mouth sores, dysphagia or nose bleed.  Respiratory: Positive shortness of breath, cough. Positive production of sputum.  Cardiovascular: no chest pain, no palpitations, no orthopnea.  Gastrointestinal: no nausea, vomiting or diarrhea. No abdominal pain, hematemesis or rectal bleeding.  Genitourinary: no dysuria or polyuria.  Hematologic/lymphatic: no lymph node abnormalities, no easy bruising or easy bleeding.  Musculoskeletal: no muscle or joint pain.  Skin: No rash and no itching.  Neurological: no loss of consciousness, no seizure, no headache.  Behavioral/Psych: no depression or suicidal ideation.  Endocrine: no hot flashes.  Immunologic: negative.    ----------------------------------------------------------------------------------------------------------------------      Objective       [facility] Meds:  amLODIPine, 5 mg, Oral, Q24H  apixaban, 5 mg, Oral, Q12H  aspirin, 81 mg, Oral, Daily  atorvastatin, 10 mg, Oral, Daily  cefepime, 2 g, Intravenous, Q24H  cholecalciferol, 50,000 Units, Oral, Weekly  fluticasone, 1 spray, Nasal, BID  hydrOXYzine,  25 mg, Oral, Nightly  insulin aspart, 0-14 Units, Subcutaneous, TID AC  insulin detemir, 5 Units, Subcutaneous, Nightly  linaclotide, 145 mcg, Oral, QAM AC  montelukast, 10 mg, Oral, Nightly  pantoprazole, 40 mg, Oral, Daily  predniSONE, 40 mg, Oral, Daily With Breakfast  sodium chloride, 10 mL, Intravenous, Q12H  sodium chloride, 10 mL, Intravenous, Q12H  Vancomycin Pharmacy Intermittent Dosing, , Does not apply, Daily      IV Fluids 1000 mL + additives, 100 mL/hr      ----------------------------------------------------------------------------------------------------------------------    Vital Signs:  Temp:  [98.1 °F (36.7 °C)-98.8 °F (37.1 °C)] 98.1 °F (36.7 °C)  Heart Rate:  [80-96] 89  Resp:  [24-28] 24  BP: (127-150)/(59-87) 133/65  Mean Arterial Pressure (Non-Invasive) for the past 24 hrs (Last 3 readings):   Noninvasive MAP (mmHg)   11/01/21 0600 90   11/01/21 0500 114   11/01/21 0403 94     SpO2 Percentage    11/01/21 0403 11/01/21 0500 11/01/21 0600   SpO2: 90% 93% 93%     SpO2:  [89 %-95 %] 93 %  on  Flow (L/min):  [3] 3;   Device (Oxygen Therapy): nasal cannula    Body mass index is 28.27 kg/m².  Wt Readings from Last 3 Encounters:   11/01/21 74.8 kg (164 lb 12.8 oz)   10/10/21 72.6 kg (160 lb)   02/05/20 78 kg (172 lb)        Intake/Output Summary (Last 24 hours) at 11/1/2021 1322  Last data filed at 11/1/2021 1200  Gross per 24 hour   Intake 490.16 ml   Output 1250 ml   Net -759.84 ml     Diet Regular; Cardiac, Consistent Carbohydrate, Renal  NPO Diet  ----------------------------------------------------------------------------------------------------------------------      Physical Exam:      Constitutional: Chronically ill-appearing.  No respiratory distress.      HENT:  Head: Normocephalic and atraumatic.  Mouth:  Moist mucous membranes.    Eyes:  Conjunctivae and EOM are normal.  No scleral icterus.  Neck:  Neck supple.  No JVD present.    Cardiovascular:  Normal rate, regular rhythm and normal  heart sounds with no murmur. No edema.  Pulmonary/Chest: Diminished lung sounds bilaterally.  Abdominal:  Soft.  Bowel sounds are normal.  No distension and no tenderness.   Musculoskeletal:  No edema, no tenderness, and no deformity.  No swelling or redness of joints.  Neurological:  Alert and oriented to person, place, and time.  No facial droop.  No slurred speech.   Skin:  Skin is warm and dry.  No rash noted.  No pallor.   Psychiatric:  Normal mood and affect.  Behavior is normal.       ----------------------------------------------------------------------------------------------------------------------  Results from last 7 days   Lab Units 11/01/21  0124 10/30/21  1513   CK TOTAL U/L 31  --    TROPONIN T ng/mL  --  <0.010     Results from last 7 days   Lab Units 10/30/21  1513   PROBNP pg/mL 714.7       Results from last 7 days   Lab Units 10/30/21  1356   PH, ARTERIAL pH units 7.359   PO2 ART mm Hg 46.6*   PCO2, ARTERIAL mm Hg 37.5   HCO3 ART mmol/L 21.1     Results from last 7 days   Lab Units 11/01/21  0124 10/31/21  0514 10/30/21  1833 10/30/21  1513   CRP mg/dL 5.96* 4.02*  --  3.39*   LACTATE mmol/L  --  0.8 2.4* 2.1*   WBC 10*3/mm3 8.02 9.03  --  8.48   HEMOGLOBIN g/dL 9.7* 9.7*  --  10.4*   HEMATOCRIT % 31.7* 31.6*  --  34.6   MCV fL 90.8 89.5  --  90.3   MCHC g/dL 30.6* 30.7*  --  30.1*   PLATELETS 10*3/mm3 248 237  --  245   INR   --   --   --  1.12*     Results from last 7 days   Lab Units 11/01/21  0124 10/31/21  0514 10/30/21  1513   SODIUM mmol/L 135* 137 135*   POTASSIUM mmol/L 5.2 4.6 5.0   MAGNESIUM mg/dL  --   --  2.1   CHLORIDE mmol/L 108* 108* 102   CO2 mmol/L 19.7* 20.2* 20.2*   BUN mg/dL 32* 28* 32*   CREATININE mg/dL 3.42* 3.14* 3.28*   EGFR IF NONAFRICN AM mL/min/1.73 14* 15* 14*   CALCIUM mg/dL 7.9* 7.7* 8.1*   GLUCOSE mg/dL 228* 78 468*   ALBUMIN g/dL 2.47* 2.69* 3.16*   BILIRUBIN mg/dL 0.3 0.4 0.3   ALK PHOS U/L 91 92 104   AST (SGOT) U/L 7 7 7   ALT (SGPT) U/L 6 6 9   Estimated  Creatinine Clearance: 17.7 mL/min (A) (by C-G formula based on SCr of 3.42 mg/dL (H)).  No results found for: AMMONIA    Glucose   Date/Time Value Ref Range Status   11/01/2021 0622 161 (H) 70 - 130 mg/dL Final     Comment:     Meter: [id number] : [id number] Rachel Lopez   10/31/2021 2239 243 (H) 70 - 130 mg/dL Final     Comment:     Meter: [id number] : [id number] Rachel Shenandoah   10/31/2021 1645 141 (H) 70 - 130 mg/dL Final     Comment:     Meter: [id number] : [id number] Tami Mallicoat   10/31/2021 1102 86 70 - 130 mg/dL Final     Comment:     Meter: [id number] : [id number] Tami Mallicoat   10/31/2021 0539 75 70 - 130 mg/dL Final     Comment:     Meter: [id number] : [id number] TODD FREEMANADLIN   10/30/2021 2242 111 70 - 130 mg/dL Final     Comment:     Meter: [id number] : [id number] Carrillo Merari   10/30/2021 1909 353 (H) 70 - 130 mg/dL Final     Comment:     Meter: [id number] : [id number] Carrillo Merari   10/30/2021 1654 510 (C) 70 - 130 mg/dL Final     Comment:     RN Notified Meter: [id number] : [id number] PARISH ZOË     Lab Results   Component Value Date    HGBA1C 10.50 (H) 10/09/2021     Lab Results   Component Value Date    TSH 0.397 10/09/2021       Blood Culture   Date Value Ref Range Status   10/30/2021 No growth at 24 hours  Preliminary   10/30/2021 No growth at 24 hours  Preliminary     No results found for: URINECX  No results found for: WOUNDCX  No results found for: STOOLCX  No results found for: RESPCX  Pain Management Panel       Pain Management Panel Latest Ref Rng & Units 10/10/2021    CREATININE UR mg/dL 41.4              ----------------------------------------------------------------------------------------------------------------------  Imaging Results (Last 24 Hours)       ** No results found for the last 24 hours. **             ----------------------------------------------------------------------------------------------------------------------    Assessment/Plan       Assessment/Plan     ASSESSMENT:    1.  Severe sepsis with lactic acid greater than 2 on admission  2.  Bilateral pneumonia    PLAN:    Patient resting in bed this morning.  Reports she feels some better today.  Continues on 3 L nasal cannula with no apparent distress.  Reports persistent cough with clear sputum.  WBC normal.  CRP slightly worsened at 5.96.    Patient was recently hospitalized at our facility from 10/9/2021 through 10/15/2021 for sepsis secondary to COVID-19 pneumonia and was treated with a regimen of Levaquin, Flagyl, micafungin after concern for superimposed bacterial pneumonia and underlying UTI.  Levaquin and Flagyl p.o. course were to be finished on 10/20/2021.    Respiratory panel PCR negative.  MRSA PCR negative.  Legionella negative.  VQ scan from 10/30/2021 reports low probability of PE.  Chest x-ray from 10/30/2021 reports significant interval worsening of bilateral pneumonia.  CT of the chest from 10/30/2021 reports continued extensive bilateral parenchymal airspace disease with progressive consolidation in multiple areas of groundglass opacity particularly in the lower lung zones.     In the setting of persistently worsening pneumonia despite adequate treatment recommend bronchoscopy for further diagnostic purposes, however patient is refusing bronchoscopy at this time.    For now recommend to continue vancomycin pharmacy to dose and cefepime pharmacy to dose empirically.  We will continue to follow closely and adjust antibiotic therapy as needed.      Code Status:   Code Status and Medical Interventions:   Ordered at: 10/30/21 1638     Code Status:    CPR     Medical Interventions (Level of Support Prior to Arrest):    Full       Dilia Lockett, APRN  11/01/21  13:22 EDT

## 2021-11-01 NOTE — PAYOR COMM NOTE
"CONTACT:  MEKHI NORMAN MSN, APRN  UTILIZATION MANAGEMENT DEPT.  [facility]  [address] [facility] [place], [address]  PHONE:  [phone]  FAX: [phone]    INPATIENT AUTHORIZATION REQUEST    Jessica Hamm (56 y.o. Female)             Date of Birth Social Security Number Address Home Phone MRN    1965  [address] [facility] [address] [phone] [id number]    Voodoo Marital Status             None Single       Admission Date Admission Type Admitting Provider Attending Provider Department, Room/Bed    10/30/21 Emergency Stephan Bolton MD Williams, Curtis Anthony, DO [facility] PROGRESS CARE, P221/1P    Discharge Date Discharge Disposition Discharge Destination                         Attending Provider: Roland Alberto DO    Allergies: Bactrim [Sulfamethoxazole-trimethoprim], Sulfa Antibiotics, Benadryl [Diphenhydramine Hcl (Sleep)], Penicillins    Isolation: None   Infection: COVID (History) (10/31/21)   Code Status: CPR   Advance Care Planning Activity    Ht: 162.6 cm (64.02\")   Wt: 74.8 kg (164 lb 12.8 oz)    Admission Cmt: None   Principal Problem: None                Active Insurance as of 10/30/2021     Primary Coverage     Payor Plan Insurance Group Employer/Plan Group    MEDICARE MEDICARE A & B      Payor Plan Address Payor Plan Phone Number Payor Plan Fax Number Effective Dates    [address] [phone]  2/1/2016 - None Entered    [facility] [id number]       Subscriber Name Subscriber Birth Date Member ID       JESSICA HAMM 1965 [id number]           Secondary Coverage     Payor Plan Insurance Group Employer/Plan Group    [employer] MEDICAID      Payor Plan Address Payor Plan Phone Number Payor Plan Fax Number Effective Dates    [address] [address] [phone]  3/9/2017 - None Entered    [facility] [id number]       Subscriber Name Subscriber Birth Date Member ID       JESSICA HAMM 1965 [id number]                 Emergency Contacts     Contact " "Person (Rel.) Home Phone Work Phone Mobile Phone    Africa Simpson (Sister) [phone] -- --    MIRTA HAMM (Son) [phone] -- --            Problem List           Codes Noted - Resolved       Hospital    Acute respiratory failure with hypoxia (HCC) ICD-10-CM: J96.01  ICD-9-CM: 518.81 10/30/2021 - Present                History & Physical      Tonie Maldonado PA-C at 10/30/21 1637     Attestation signed by Stephan Bolton MD at 10/30/21 1814    I have evaluated the patient independently, I have reviewed H&P, all labs and images from today and evaluated the patient at bedside.  I have discussed w/ BHUPENDRA Montalvo and agree.  Patient recently admitted, for covid and bacterial PNA, discharged 10/15 though reports last few days has been having fevers and chills at home, cough, reports was around her neighbor and thinks she might have \"caught it from her\", started on broad spectrum Abx, f/u chest CT, consulted ID as were recently following as well, has been sufficient time to be out of covid isolation, continue 02 via NC, check formal echo as has not had since 2017, Cr up and has solitary kidney, check renal US, consult nephrology, Glc uncontrolled and will adjust insulin regimen accordingly.                              [facility]  History & Physical    Patient Identification:  Name:  Jessica Hamm  Age:  56 y.o.  Sex:  female  :  1965  MRN:  [id number]   Visit Number:  [id number]  Admit Date: 10/30/2021   Primary Care Physician:  Buck Wallis APRN    Subjective     Chief complaint: Worsening dyspnea, recent COVID pneumonia    History of presenting illness:      Jessica Hamm is a 56 y.o. female with past medical history significant for CKD IV previously marked ineligible for transplant during [facility] transplant eval in 2021 per documentation review with mention of poor social support and concern for medical noncompliance, DM2, Essential HTN, COPD, History chronic " "nausea and vomiting with concern for underlying gastroparesis, hyperparathyroidism.      Mrs. Bravo was hospitalized at this facility from  10/9/2021 through 10/15/21 with sepsis 2/2 covid-19 pneumonia, ANA PAULA on CKD IV, DM2, essential HTN, Prior to this stay, she had also apparently been at [facility] for stay with COVID-19 PNA.  She was treated with regimen of Levaquin, flagyl, and micafungin with concern for superimposed bacterial pneuononia and underlying UTI.        Upon arrival to the ED, vital signs were T 97.5F, pulse 107, RR 24, and /69.  Room air oxygen saturation was 80% and she reports it had dropped into the 60s at home per her recent pulse oximeter monitor.   She reports worsening dyspnea with exertion and rest since her most recent hospitalization.  She reports worsening chills.   She reports worsening dyspnea at rest and swelling of her lower extremities. She reports she did not use her home insulin this morning because her glucose was low.  She reports she then ate a peanut butter and jelly sandwich and ate some orange juice before coming to the ED.  She reports worsening chills and fatigue. She denies dysuria and hematuria.  She denies chest pain or burning.  She denies receiving steroids since leaving the hospital. She reports she has been compliant with Eliquis after discharge.        Mrs. Bravo reports her PCP obtained CXR on 10/29/21 due to dyspnea.  She reports the person doing the xray asked if her she had recent \"kizzy infection\", because they felt as if her lungs looked similar to such infection.    ---------------------------------------------------------------------------------------------------------------------   Review of Systems   Constitutional: Positive for chills and fatigue. Negative for fever.   HENT: Positive for congestion. Negative for drooling.    Eyes: Negative for pain and discharge.   Respiratory: Positive for cough and shortness of breath. Negative for wheezing.  "   Cardiovascular: Positive for leg swelling. Negative for chest pain.   Gastrointestinal: Negative for abdominal distention, diarrhea, nausea and vomiting.   Endocrine: Negative for cold intolerance.   Genitourinary: Negative for difficulty urinating and dysuria.   Musculoskeletal: Negative for arthralgias and gait problem.   Skin: Negative for color change, rash and wound.   Allergic/Immunologic: Negative for environmental allergies and food allergies.   Neurological: Negative for dizziness and headaches.   Hematological: Negative for adenopathy. Does not bruise/bleed easily.   Psychiatric/Behavioral: Negative for agitation and confusion.        ---------------------------------------------------------------------------------------------------------------------   Past Medical History:   Diagnosis Date   • Chronic kidney disease     only has one kidney, has been in hopsital 3 times since last visit    • ESRD on hemodialysis (HCC)    • Essential hypertension    • Hepatic steatosis    • History of noncompliance with medical treatment    • Pre-transplant evaluation for kidney transplant 03/31/2021    Declined by the [facility]   • Single kidney    • Type 2 diabetes mellitus (HCC)      Past Surgical History:   Procedure Laterality Date   • HYSTERECTOMY       Family History   Problem Relation Age of Onset   • Diabetes Mother    • Hypertension Mother    • Diabetes Father    • Hypertension Father    • Diabetes Sister    • Hypertension Sister    • Diabetes Brother      Social History     Socioeconomic History   • Marital status: Single   Tobacco Use   • Smoking status: Never Smoker   • Smokeless tobacco: Never Used   Substance and Sexual Activity   • Alcohol use: No   • Drug use: No   • Sexual activity: Defer     ---------------------------------------------------------------------------------------------------------------------   Allergies:  Bactrim [sulfamethoxazole-trimethoprim], Sulfa antibiotics, Benadryl  [diphenhydramine hcl (sleep)], and Penicillins  ---------------------------------------------------------------------------------------------------------------------   Home medications:    Medications below are reported home medications pulling from within the system; at this time, these medications have not been reconciled unless otherwise specified and are in the verification process for further verifcation as current home medications.  (Not in a hospital admission)      Hospital Scheduled Meds:  cefepime, 1 g, Intravenous, Q12H  Linezolid, 600 mg, Intravenous, Q12H  metroNIDAZOLE, 500 mg, Intravenous, Once           Current listed hospital scheduled medications may not yet reflect those currently placed in orders that are signed and held awaiting patient's arrival to floor.   ---------------------------------------------------------------------------------------------------------------------     Objective     Vital Signs:  Temp:  [97.5 °F (36.4 °C)] 97.5 °F (36.4 °C)  Heart Rate:  [] 90  Resp:  [20-24] 20  BP: (136-155)/(58-77) 136/58      10/30/21  1336   Weight: 68 kg (150 lb)     Body mass index is 25.75 kg/m².  ---------------------------------------------------------------------------------------------------------------------       Physical Exam  Constitutional:       General: She is awake.      Appearance: Normal appearance. She is well-developed.   HENT:      Head: Normocephalic and atraumatic.      Mouth/Throat:      Lips: Pink.      Mouth: Mucous membranes are moist.   Eyes:      General: Lids are normal.      Conjunctiva/sclera:      Right eye: Right conjunctiva is not injected.      Left eye: Left conjunctiva is not injected.   Cardiovascular:      Rate and Rhythm: Normal rate and regular rhythm.      Heart sounds: S1 normal and S2 normal.   Pulmonary:      Effort: No tachypnea or bradypnea.      Breath sounds: Examination of the right-lower field reveals decreased breath sounds. Examination of  the left-lower field reveals decreased breath sounds. Decreased breath sounds present. No wheezing, rhonchi or rales.   Abdominal:      General: Bowel sounds are normal.      Palpations: Abdomen is soft.      Tenderness: There is no abdominal tenderness.   Musculoskeletal:      Right lower leg: Edema present.      Left lower leg: Edema present.   Skin:     General: Skin is warm and dry.   Neurological:      Mental Status: She is alert and oriented to person, place, and time.   Psychiatric:         Attention and Perception: Attention normal.         Mood and Affect: Mood normal.         Behavior: Behavior is cooperative.               I have personally reviewed the above radiology images and read the final radiology report on 10/30/21  ---------------------------------------------------------------------------------------------------------------------  Assessment / Plan     Active Hospital Problems    Diagnosis  POA   • Acute respiratory failure with hypoxia (HCC) [J96.01]  Yes       ASSESSMENT/PLAN:  -Acute hypoxemic respiratory failure likely 2/2 HCAP:   -Severe sepsis 2/2 HCAP, POA, with HR>90, RR>20, and lactate>2:   -Recent COVID-19 dx with PNA:   • Continuous cardiac monitoring.   • Continuous pulse oximetry monitoring.   • Add SLP evaluation.   • Continue IV Cefepime, vancomycin, and Flagyl for coverage of HCAP.   • Hospitalized both here and [facility] with COVID.   • Add respiratory PCR.   • Add MRSA nares.   • Add sputum culture  • Peripheral blood cultures x2 pending at present.   • Monitor vitals per hospital protocol.   • ABG reviewed with pO2 46.6.   • ID consult added.    • Peripheral blood cultures x2 obtained and pending.    • CT chest without contrast pending.     -ANA PAULA on CKD IV:   · Referred to [place] for 2nd opinion regarding transplant evaluation after not listed by [facility] with concern for medical noncompliance  & poor social support.   · Continue follow-up with outpatient Neprhologist Dr. Araiza.    · US renal added.   · Avoid nephrotoxins when possible.   · Repeat AM BMP.     -Hyperglycemia with DM 2, ID:   -Pseudohyponatremia 2/2 hyperglycemia:   Given diabetes mellitus, fingerstick blood glucose monitoring has been ordered AC&HS.   Had 5 units of regular insulin in the ED, glucose increased following.    3 more units of regular insulin given after return glucose of 510.   Change glucoses to q2 for now. Anion gap WNL. ABG with pH WNL.   Sliding scale insulin has been ordered PRN.   Hemoglobin a1c 10.5 on 10/9/21.   Anion gap WNL in spite of extremely elevated glucoses.    Home DM regimen to be reviewed upon availability.       -Elevated d-dimer:   · VQ scan without known DVT.    · CT chest PE protocol not obtained given ANA PAULA on CKD IV.   · US venous doppler added.   · Reports she believes she was compliant with discharge Eliquis.     ----------  -DVT prophylaxis: Home Eliquis to serve  -Activity: Up with assist  -Expected length of stay: INPATIENT status due to the need for care which can only be reasonably provided in an hospital setting such as aggressive/expedited ancillary services and/or consultation services, the necessity for IV medications, close physician monitoring and/or the possible need for procedures.  In such, I feel patient’s risk for adverse outcomes and need for care warrant INPATIENT evaluation and predict the patient’s care encounter to likely last beyond 2 midnights.  -Disposition: Desires to return home at discharge    High risk secondary to acute hypoxemic respiratory failure         Tonie Maldonado PA-C  10/30/21  17:35 EDT  Pager # [phone]  ---------------------------------------------------------------------------------------------------------------------             Electronically signed by Stephan Bolton MD at 10/30/21 3989          Emergency Department Notes      Michael Puente MD at 10/30/21 1414            [facility]  emergency department encounter         Pt Name: Jessica Bravo  MRN: [id number]  Birthdate 1965  Date of evaluation: 10/30/2021    Chief Complaint   Patient presents with   • Shortness of Breath   • Cough             HISTORY OF PRESENT ILLNESS:   Jessica Bravo is a 56 y.o. female PMH HTN, ESRD on hemodialysis, T2DM, solitary kidney presents for shortness of breath.  Patient was recently admitted to the hospital with Covid pneumonia.  She was discharged 2 weeks ago on October 15.  She reports progressively worsening shortness of breath since that time.  Endorses significant cough with pleuritic pain upon cough.  Posttussive emesis.  Patient saw her primary care provider yesterday who ordered her home oxygen.  She started on 2 to 3 L nasal cannula last night.  Endorses chills without fever.  No chest pain without cough.  No significant abdominal pain ongoing nausea, vomiting, diarrhea or dysuria.  No headaches lightheadedness dizziness visual changes congestion runny nose.    Patient placed on room air by nursing staff on arrival with oxygen saturation 80%.    Patient was on dialysis 3 times 4-5 years ago.  She currently makes urine is not on dialysis.    No other exacerbating or alleviating factors other than as noted above.  Severity: Severe    PCP: Buck Wallis APRN          REVIEW OF SYSTEMS:     Review of Systems   Constitutional: Positive for chills. Negative for fever.   HENT: Negative for congestion and rhinorrhea.    Respiratory: Positive for cough and shortness of breath.    Cardiovascular: Negative for chest pain (associated with cough).   Gastrointestinal: Negative for abdominal pain, nausea and vomiting (posttussive).   Genitourinary: Negative for dysuria.   Musculoskeletal: Negative for myalgias.   Skin: Negative for rash.   Neurological: Negative for headaches.   Psychiatric/Behavioral: Negative for confusion.       Review of systems otherwise as per HPI.        PHYSICAL EXAM:     Physical Exam  Vitals and nursing note reviewed.    Constitutional:       General: She is not in acute distress.     Appearance: She is ill-appearing. She is not toxic-appearing.   HENT:      Head: Normocephalic and atraumatic.   Eyes:      Extraocular Movements: Extraocular movements intact.      Conjunctiva/sclera: Conjunctivae normal.   Pulmonary:      Effort: Pulmonary effort is normal. No respiratory distress.      Breath sounds: Normal breath sounds. No stridor. No wheezing, rhonchi or rales.   Abdominal:      General: Abdomen is flat. There is no distension.   Musculoskeletal:         General: No deformity. Normal range of motion.      Cervical back: Normal range of motion. No rigidity.   Neurological:      General: No focal deficit present.      Mental Status: She is alert and oriented to person, place, and time.   Psychiatric:         Mood and Affect: Mood normal.         Behavior: Behavior normal.         Procedures            MEDICAL DECISION-MAKING AND ED COURSE:     ED Course as of 10/30/21 1628   Sat Oct 30, 2021   1442 EKG at 1423 hrs., no sinus rhythm, 99 bpm, , QRS 68, QTc 479, single PAC noted.  Significant artifact obscuring baseline.  Poor R wave progression however no significant ST deviation or T wave abnormalities concerning for acute ischemia. [KP]   1530 MDM: 56-year-old female discharged 2 weeks ago after presentation for acute hypoxic respiratory failure with Covid.  Now with worsening cough, chest pain associated with cough.  Patient is ESRD, unable to obtain CT PE scan.  Nuc med VQ scan low risk PE.  CXR with worsening diffuse bilateral airspace disease concerning for pneumonia.  Will treat for healthcare acquired pneumonia given recent hospitalization, started on linezolid and cefepime. [KP]   1532 WBC: 8.48 [KP]   1532 Hemoglobin(!): 10.4 [KP]   1532 Platelets: 245 [KP]   1532 O2 Saturation, Arterial(!): 82.5 [KP]   1603 D-Dimer, Quant(!!): 0.90 [KP]   1603 Lactate(!!): 2.1 [KP]   1603 Magnesium: 2.1 [KP]   1603 C-Reactive  Protein(!): 3.39 [KP]   1603 Troponin T: <0.010 [KP]   1603 proBNP: 714.7 [KP]   1603 Glucose(!!): 468 [KP]   1603 Creatinine(!): 3.28 [KP]   1603 CO2(!): 20.2 [KP]   1604 No DKA.  Glucose not elevated enough for HHS and no such associated symptoms.  We will treat with insulin 5 units and small 500 mL fluid bolus given renal disease.  No prior creatinine for comparison. [KP]   1619 Care everywhere a creatinine in January 2021 2.8.  Likely ANA PAULA. [KP]   1627 Admit to Dr. Bolton.  Metronidazole and CT noncontrast ordered. [KP]      ED Course User Index  [KP] Michael Puente MD       ?      FINAL IMPRESSION:       1. Acute respiratory failure with hypoxia (HCC)    2. Pneumonia of both lungs due to infectious organism, unspecified part of lung    3. ANA PAULA (acute kidney injury) (HCC)         The complaints listed here are new problems to this examiner.      FOLLOW-UP  No follow-up provider specified.    DISPOSITION  ED Disposition     ED Disposition Condition Comment    Decision to Admit                  This care is provided during an unprecedented national emergency due to the Novel Coronavirus (COVID-19). COVID-19 infections and transmission risks place heavy strains on healthcare resources. As this pandemic evolves, the Hospital and providers strive to respond fluidly, to remain operational, and to provide care relative to available resources and information. Outcomes are unpredictable and treatments are without well-defined guidelines. Further, the impact of COVID-19 on all aspects of emergency care, including the impact to patients seeking care for reasons other than COVID-19, is unavoidable during this national emergency.    This note was dictated using a [id number] tool. Occasional wrong-word or 'sound-a-like' substitutions may have occurred due to the inherent limitations of voice recognition software. ?Read the chart carefully and recognize, using context, where substitutions have occurred.    Michael Puente,  MD  16:28 EDT  10/30/2021             Michael Puente MD  10/30/21 1628      Electronically signed by Michael Puente MD at 10/30/21 1628           Oxygen Therapy (since admission)     Date/Time SpO2 Device (Oxygen Therapy) Flow (L/min) Oxygen Concentration (%) ETCO2 (mmHg)    11/01/21 0600 93 -- -- -- --    11/01/21 0500 93 -- -- -- --    11/01/21 0403 90 -- -- -- --    11/01/21 0304 89 -- -- -- --    11/01/21 0204 95 -- -- -- --    11/01/21 0155 -- nasal cannula 3 -- --    11/01/21 0103 92 -- -- -- --    11/01/21 0003 90 -- -- -- --    10/31/21 2303 92 -- -- -- --    10/31/21 2203 93 -- -- -- --    10/31/21 2103 91 -- -- -- --    10/31/21 2040 -- nasal cannula 3 -- --    10/31/21 2003 93 -- -- -- --    10/31/21 1900 92 -- -- -- --    10/31/21 1803 92 -- -- -- --    10/31/21 1700 94 -- -- -- --    10/31/21 1600 95 -- -- -- --    10/31/21 1500 91 -- -- -- --    10/31/21 1400 95 -- -- -- --    10/31/21 1300 92 -- -- -- --    10/31/21 1200 91 -- -- -- --    10/31/21 1100 92 -- -- -- --    10/31/21 1000 92 -- -- -- --    10/31/21 0900 94 -- -- -- --    10/31/21 0834 -- nasal cannula 3 -- --    10/31/21 0800 92 -- -- -- --    10/31/21 0700 92 -- -- -- --    10/31/21 0600 92 nasal cannula 3 -- --    10/31/21 0500 92 nasal cannula 3 -- --    10/31/21 0400 94 nasal cannula 3 -- --    10/31/21 0300 92 nasal cannula 3 -- --    10/31/21 0200 93 nasal cannula 3 -- --    10/31/21 0100 93 nasal cannula 3 -- --    10/31/21 0000 94 nasal cannula 3 -- --    10/30/21 2300 95 nasal cannula 3 -- --    10/30/21 2200 93 nasal cannula 3 -- --    10/30/21 2128 91 nasal cannula 3 -- --    10/30/21 1943 -- nasal cannula 3 -- --    10/30/21 1757 -- nasal cannula 3 -- --    10/30/21 1748 90 nasal cannula 3 -- --    10/30/21 1635 94 -- -- -- --    10/30/21 1633 93 -- -- -- --    10/30/21 1632 93 -- -- -- --    10/30/21 1631 94 -- -- -- --    10/30/21 1618 95 -- -- -- --    10/30/21 1602 97 -- -- -- --    10/30/21 1547 97 -- -- -- --     10/30/21 1532 97 -- -- -- --    10/30/21 1518 96 nasal cannula 3 -- --    10/30/21 1423 97 -- -- -- --    10/30/21 1420 97 -- -- -- --    10/30/21 1405 97 -- -- -- --    10/30/21 1404 -- nasal cannula 3 -- --    10/30/21 1403 97 -- -- -- --    10/30/21 1402 97 -- -- -- --    10/30/21 1401 97 -- -- -- --    10/30/21 1400 97 -- -- -- --    10/30/21 1359 97 -- -- -- --    10/30/21 1358 96 -- -- -- --    10/30/21 1357 94 -- -- -- --    10/30/21 1356 95 nasal cannula 3 -- --    10/30/21 1355 89 -- -- -- --    10/30/21 1354 84 nasal cannula 3 -- --    10/30/21 1353 86 -- -- -- --    10/30/21 1352 86 -- -- -- --    10/30/21 1351 84 -- -- -- --    10/30/21 1350 83 -- -- -- --    10/30/21 1336 80 room air -- -- --        Intake & Output (last 2 days)       10/30 0701  10/31 0700 10/31 0701  11/01 0700 11/01 0701 11/02 0700    P.O.  580 120    I.V. (mL/kg)  250.2 (3.3)     IV Piggyback 1100      Total Intake(mL/kg) 1100 (14.6) 830.2 (11.1) 120 (1.6)    Urine (mL/kg/hr)  1350 (0.8) 200 (1.9)    Total Output  1350 200    Net +1100 -519.8 -80                 Medication Administration Report for Jessica Bravo as of 11/01/21 0824   Medications 10/30/21 10/31/21 11/01/21    amLODIPine (NORVASC) tablet 5 mg  Dose: 5 mg  Freq: Every 24 Hours Scheduled Route: PO  Start: 10/31/21 0900   Admin Instructions:   Hold for SBP<120  Caution: Look alike/sound alike drug alert. Avoid grapefruit juice.        0835-Given             0900             apixaban (ELIQUIS) tablet 5 mg  Dose: 5 mg  Freq: Every 12 Hours Scheduled Route: PO  Indications of Use: Other - full anticoagulation  Start: 10/30/21 2100   Admin Instructions:   Tablet may be crushed and suspended in 60 mL of water or D5W and immediately delivered via NG tube.       2121-Given             0835-Given     2053-Given            0900 2100            aspirin EC tablet 81 mg  Dose: 81 mg  Freq: Daily Route: PO  Start: 10/31/21 0900   Admin Instructions:   Do not exceed 4 grams of  aspirin in a 24 hr period.    If given for pain, use the following pain scale:   Mild Pain = Pain Score of 1-3, CPOT 1-2  Moderate Pain = Pain Score of 4-6, CPOT 3-4  Severe Pain = Pain Score of 7-10, CPOT 5-8        0835-Given             0900             atorvastatin (LIPITOR) tablet 10 mg  Dose: 10 mg  Freq: Daily Route: PO  Start: 10/31/21 1200   Admin Instructions:   Therapeutic Interchange for Pravastatin 10mg daily.  Avoid grapefruit juice.        1102-Given             0900             cefepime 2 gm IVPB in 100 ml NS (VTB)  Dose: 2 g  Freq: Every 24 Hours Route: IV  Indications of Use: PNEUMONIA  Start: 10/31/21 1700   End: 11/07/21 1659 1706-New Bag             1700             cholecalciferol (VITAMIN D3) capsule 50,000 Units  Dose: 50,000 Units  Freq: Weekly Route: PO  Start: 10/30/21 2100       2121-Given               fluticasone (FLONASE) 50 MCG/ACT nasal spray 1 spray  Dose: 1 spray  Freq: 2 Times Daily Route: NA  Start: 10/30/21 2100       2122-Given             0834-Given     2055-Given            0900 2100            hydrOXYzine (ATARAX) tablet 25 mg  Dose: 25 mg  Freq: Nightly Route: PO  Start: 10/30/21 2100   Admin Instructions:   Caution: Look alike/sound alike drug alert       2124-Given             2053-Given 2100             insulin aspart (novoLOG) injection 0-14 Units  Dose: 0-14 Units  Freq: 3 Times Daily Before Meals Route: SC  Start: 10/30/21 2015   Admin Instructions:   Correction - Moderate-High Dose.  60-80 units/day total insulin dose or uses insulin at home    Blood glucose 150-199 mg/dL - 3 units  Blood glucose 200-249 mg/dL - 5 units  Blood glucose 250-299 mg/dL - 8 units  Blood glucose 300-349 mg/dL - 10 units  Blood glucose 350-400 mg/dL - 12 units  Blood glucose greater than 400 mg/dL - 14 units and call provider         1937-Given             (0748)-Not Given     (1115)-Not Given     (1645)-Not Given           0730     1130     1730           insulin  "detemir (LEVEMIR) injection 5 Units  Dose: 5 Units  Freq: Nightly Route: SC  Start: 10/31/21 2100   Admin Instructions:           2103-Given             2100             linaclotide (LINZESS) capsule 145 mcg  Dose: 145 mcg  Freq: Every Morning Before Breakfast Route: PO  Start: 10/31/21 0730   Admin Instructions:   Do not crush, split, or chew.        (0835)-Not Given             0730             montelukast (SINGULAIR) tablet 10 mg  Dose: 10 mg  Freq: Nightly Route: PO  Start: 10/30/21 2100       2121-Given             2053-Given             2100             pantoprazole (PROTONIX) EC tablet 40 mg  Dose: 40 mg  Freq: Daily Route: PO  Start: 10/31/21 0900   Admin Instructions:   Swallow whole; do not crush, split, or chew.        0835-Given             0900             sodium chloride 0.9 % flush 10 mL  Dose: 10 mL  Freq: Every 12 Hours Scheduled Route: IV  Start: 10/31/21 1345        (1254)-Not Given     2056-Given            0900 2100            sodium chloride 0.9 % flush 10 mL  Dose: 10 mL  Freq: Every 12 Hours Scheduled Route: IV  Start: 10/30/21 2100       1937-Given     2100-Canceled Entry            0834-Given     2056-Given            0900 2100            Vancomycin Pharmacy Intermittent Dosing  Freq: Daily Route: XX  Indications of Use: PNEUMONIA  Start: 10/30/21 1900   End: 11/06/21 0859   Admin Instructions:   Patient is on intermittent Vancomycin dosing. This is an informational \"Pharmacy Dosing\" note only. Please joy MAR as \"Not given\".       (1917)-Not Given             0806-Canceled Entry             0900            Completed Medications  Medications 10/30/21 10/31/21 11/01/21       insulin regular (humuLIN R,novoLIN R) injection 3 Units  Dose: 3 Units  Freq: Once Route: IV  Start: 10/30/21 1720   End: 10/30/21 1727   Admin Instructions:    Caution: Look alike/sound alike drug alert       1727-Given               insulin regular (humuLIN R,novoLIN R) injection 5 Units  Dose: 5 Units  Freq: " Once Route: IV  Start: 10/30/21 1620   End: 10/30/21 1618   Admin Instructions:    Caution: Look alike/sound alike drug alert       1618-Given               metroNIDAZOLE (FLAGYL) 500 mg/100mL IVPB  Dose: 500 mg  Freq: Once Route: IV  Indications of Use: PNEUMONIA  Start: 10/30/21 1629   End: 10/30/21 1758   Admin Instructions:   Caution: Look alike/sound alike drug alert.  Do not refrigerate.       1728-New Bag               sodium chloride 0.9 % bolus 500 mL  Dose: 500 mL  Freq: Once Route: IV  Start: 10/30/21 1607   End: 10/30/21 1747       1613-New Bag     1730-Currently Infusing     1747-Stopped             technetium albumin aggregated (MAA) solution 1 dose  Dose: 1 dose  Freq: Once in Imaging Route: IV  Start: 10/30/21 1427   End: 10/30/21 1427   Order specific questions:   Millicuries: 3.8         1427-Given               vancomycin 1 g/250 mL 0.9% NS (vial-mate)  Dose: 1,000 mg  Freq: Once Route: IV  Indications of Use: PNEUMONIA  Start: 10/31/21 1600   End: 10/31/21 1654        1554-New Bag              vancomycin 1500 mg/500 mL 0.9% NS IVPB ([facility])  Dose: 20 mg/kg  Weight Dosing Info: 75.2 kg  Freq: Once Route: IV  Indications of Use: PNEUMONIA  Start: 10/30/21 1900   End: 10/30/21 2107       1937-New Bag              Discontinued Medications  Medications 10/30/21 10/31/21 11/01/21       cefepime 1 gm IVPB in 100 mL NS (VTB)  Dose: 1 g  Freq: Every 12 Hours Route: IV  Indications of Use: PNEUMONIA  Start: 10/30/21 1700   End: 10/30/21 1808       1651-New Bag     1723-Stopped              cefepime 2 gm IVPB in 100 ml NS (VTB)  Dose: 2 g  Freq: Every 8 Hours Route: IV  Indications of Use: PNEUMONIA  Start: 10/31/21 0200   End: 10/30/21 1807          insulin regular (humuLIN R,novoLIN R) injection 10 Units  Dose: 10 Units  Freq: Once Route: IV  Start: 10/30/21 1606   End: 10/30/21 1618   Admin Instructions:    Caution: Look alike/sound alike drug alert       (8081)-Not Given               Linezolid (ZYVOX) 600  mg 300 mL  Dose: 600 mg  Freq: Every 12 Hours Route: IV  Indications of Use: PNEUMONIA  Start: 10/30/21 1600   End: 10/30/21 1805   Admin Instructions:   Protect from light. Do NOT refrigerate.   Order specific questions:   Reason for Therapy Empiric MRSA pneumonia         1614-New Bag     1728-Stopped              metroNIDAZOLE (FLAGYL) 500 mg/100mL IVPB  Dose: 500 mg  Freq: Every 8 Hours Route: IV  Indications of Use: PNEUMONIA  Start: 10/31/21 0200   End: 10/31/21 0944   Admin Instructions:   Caution: Look alike/sound alike drug alert.  Do not refrigerate.        0258-New Bag              pravastatin (PRAVACHOL) tablet 10 mg  Dose: 10 mg  Freq: Daily Route: PO  Start: 10/31/21 0900   End: 10/31/21 0908   Admin Instructions:   Avoid grapefruit juice.        (0938)-Not Given              sodium chloride 0.9 % bolus 1,000 mL  Dose: 1,000 mL  Freq: Once Route: IV  Start: 10/30/21 1606   End: 10/30/21 1605                and   Medication Administration Report for Jessica Bravo as of 11/01/21 0824   Medications 10/30/21 10/31/21 11/01/21   Discontinued Medications    Pharmacy to dose vancomycin  Freq: Continuous PRN Route: XX  PRN Reason: Consult  Indications of Use: PNEUMONIA  Start: 10/30/21 1804   End: 10/30/21 1814                 Lab Results (all)     Procedure Component Value Units Date/Time    POC Glucose Once [[id number]]  (Abnormal) Collected: 11/01/21 0622    Specimen: Blood Updated: 11/01/21 0628     Glucose 161 mg/dL      Comment: Meter: [id number] : [id number] [facility]       Comprehensive Metabolic Panel [[id number]]  (Abnormal) Collected: 11/01/21 0124    Specimen: Blood Updated: 11/01/21 0150     Glucose 228 mg/dL      BUN 32 mg/dL      Creatinine 3.42 mg/dL      Sodium 135 mmol/L      Potassium 5.2 mmol/L      Chloride 108 mmol/L      CO2 19.7 mmol/L      Calcium 7.9 mg/dL      Total Protein 6.0 g/dL      Albumin 2.47 g/dL      ALT (SGPT) 6 U/L      AST (SGOT) 7 U/L      Alkaline Phosphatase 91 U/L       Total Bilirubin 0.3 mg/dL      eGFR Non African Amer 14 mL/min/1.73      Comment: <15 Indicative of kidney failure.        eGFR   Amer --     Comment: <15 Indicative of kidney failure.        Globulin 3.5 gm/dL      A/G Ratio 0.7 g/dL      BUN/Creatinine Ratio 9.4     Anion Gap 7.3 mmol/L     C-reactive Protein [[id number]]  (Abnormal) Collected: 11/01/21 0124    Specimen: Blood Updated: 11/01/21 0150     C-Reactive Protein 5.96 mg/dL     CBC (No Diff) [[id number]]  (Abnormal) Collected: 11/01/21 0124    Specimen: Blood Updated: 11/01/21 0129     WBC 8.02 10*3/mm3      RBC 3.49 10*6/mm3      Hemoglobin 9.7 g/dL      Hematocrit 31.7 %      MCV 90.8 fL      MCH 27.8 pg      MCHC 30.6 g/dL      RDW 16.7 %      RDW-SD 55.8 fl      MPV 9.3 fL      Platelets 248 10*3/mm3     POC Glucose Once [[id number]]  (Abnormal) Collected: 10/31/21 2239    Specimen: Blood Updated: 10/31/21 2246     Glucose 243 mg/dL      Comment: Meter: [id number] : [id number] Rachel Lopez       POC Glucose Once [[id number]]  (Abnormal) Collected: 10/31/21 1645    Specimen: Blood Updated: 10/31/21 1733     Glucose 141 mg/dL      Comment: Meter: [id number] : [id number] Tami Prince       Blood Culture - Blood, Arm, Left [[id number]]  (Normal) Collected: 10/30/21 1514    Specimen: Blood from Arm, Left Updated: 10/31/21 1601     Blood Culture No growth at 24 hours    Blood Culture - Blood, Arm, Left [[id number]]  (Normal) Collected: 10/30/21 1514    Specimen: Blood from Arm, Left Updated: 10/31/21 1530     Blood Culture No growth at 24 hours    S. Pneumo Ag Urine or CSF - Urine, Urine, Clean Catch [[id number]]  (Normal) Collected: 10/30/21 1914    Specimen: Urine, Clean Catch Updated: 10/31/21 1307     Strep Pneumo Ag Negative    POC Glucose Once [[id number]]  (Normal) Collected: 10/31/21 1102    Specimen: Blood Updated: 10/31/21 1108     Glucose 86 mg/dL      Comment: Meter: [id number] : [id number] Tami Prince        Vancomycin, Random [[id number]]  (Normal) Collected: 10/31/21 0514    Specimen: Blood Updated: 10/31/21 0604     Vancomycin Random 18.70 mcg/mL     Comprehensive Metabolic Panel [[id number]]  (Abnormal) Collected: 10/31/21 0514    Specimen: Blood Updated: 10/31/21 0551     Glucose 78 mg/dL      BUN 28 mg/dL      Creatinine 3.14 mg/dL      Sodium 137 mmol/L      Potassium 4.6 mmol/L      Chloride 108 mmol/L      CO2 20.2 mmol/L      Calcium 7.7 mg/dL      Total Protein 6.2 g/dL      Albumin 2.69 g/dL      ALT (SGPT) 6 U/L      AST (SGOT) 7 U/L      Alkaline Phosphatase 92 U/L      Total Bilirubin 0.4 mg/dL      eGFR Non African Amer 15 mL/min/1.73      Globulin 3.5 gm/dL      A/G Ratio 0.8 g/dL      BUN/Creatinine Ratio 8.9     Anion Gap 8.8 mmol/L     C-reactive Protein [[id number]]  (Abnormal) Collected: 10/31/21 0514    Specimen: Blood Updated: 10/31/21 0551     C-Reactive Protein 4.02 mg/dL     Lactic Acid, Plasma [[id number]]  (Normal) Collected: 10/31/21 0514    Specimen: Blood Updated: 10/31/21 0550     Lactate 0.8 mmol/L     POC Glucose Once [[id number]]  (Normal) Collected: 10/31/21 0539    Specimen: Blood Updated: 10/31/21 0546     Glucose 75 mg/dL      Comment: Meter: [id number] : [id number] TODD GORDON       CBC (No Diff) [[id number]]  (Abnormal) Collected: 10/31/21 0514    Specimen: Blood Updated: 10/31/21 0532     WBC 9.03 10*3/mm3      RBC 3.53 10*6/mm3      Hemoglobin 9.7 g/dL      Hematocrit 31.6 %      MCV 89.5 fL      MCH 27.5 pg      MCHC 30.7 g/dL      RDW 16.6 %      RDW-SD 54.2 fl      MPV 9.5 fL      Platelets 237 10*3/mm3     POC Glucose Once [[id number]]  (Normal) Collected: 10/30/21 2242    Specimen: Blood Updated: 10/30/21 2247     Glucose 111 mg/dL      Comment: Meter: [id number] : [id number] Gerardo Gage       MRSA Screen, PCR (Inpatient) - Swab, Nares [[id number]]  (Normal) Collected: 10/30/21 1914    Specimen: Swab from Nares Updated: 10/30/21 2041     MRSA PCR Negative      Staph aureus by PCR Negative    Respiratory Panel, PCR (WITHOUT COVID) - Swab, Nasopharynx [[id number]]  (Normal) Collected: 10/30/21 1914    Specimen: Swab from Nasopharynx Updated: 10/30/21 2021     ADENOVIRUS, PCR Not Detected     Coronavirus 229E Not Detected     Coronavirus HKU1 Not Detected     Coronavirus NL63 Not Detected     Coronavirus OC43 Not Detected     Human Metapneumovirus Not Detected     Human Rhinovirus/Enterovirus Not Detected     Influenza B PCR Not Detected     Parainfluenza Virus 1 Not Detected     Parainfluenza Virus 2 Not Detected     Parainfluenza Virus 3 Not Detected     Parainfluenza Virus 4 Not Detected     Bordetella pertussis pcr Not Detected     Chlamydophila pneumoniae PCR Not Detected     Mycoplasma pneumo by PCR Not Detected     Influenza A PCR Not Detected     RSV, PCR Not Detected     Bordetella parapertussis PCR Not Detected    Legionella Antigen, Urine - Urine, Urine, Clean Catch [[id number]]  (Normal) Collected: 10/30/21 1914    Specimen: Urine, Clean Catch Updated: 10/30/21 1943     LEGIONELLA ANTIGEN, URINE Negative    Narrative:      Presumptive negative for L. pneumophilia serogroup 1 antigen, suggesting no recent or current infection.    POC Glucose Once [[id number]]  (Abnormal) Collected: 10/30/21 1909    Specimen: Blood Updated: 10/30/21 1928     Glucose 353 mg/dL      Comment: Meter: [id number] : [id number] Gerardo Gage       STAT Lactic Acid, Reflex [[id number]]  (Abnormal) Collected: 10/30/21 1833    Specimen: Blood Updated: 10/30/21 1902     Lactate 2.4 mmol/L     POC Glucose Once [[id number]]  (Abnormal) Collected: 10/30/21 1654    Specimen: Blood Updated: 10/30/21 1701     Glucose 510 mg/dL      Comment: RN Notified Meter: [id number] : [id number] [facility]       Comprehensive Metabolic Panel [[id number]]  (Abnormal) Collected: 10/30/21 1513    Specimen: Blood Updated: 10/30/21 1603     Glucose 468 mg/dL      BUN 32 mg/dL      Creatinine 3.28 mg/dL       Sodium 135 mmol/L      Potassium 5.0 mmol/L      Chloride 102 mmol/L      CO2 20.2 mmol/L      Calcium 8.1 mg/dL      Total Protein 6.8 g/dL      Albumin 3.16 g/dL      ALT (SGPT) 9 U/L      AST (SGOT) 7 U/L      Alkaline Phosphatase 104 U/L      Total Bilirubin 0.3 mg/dL      eGFR Non African Amer 14 mL/min/1.73      Comment: <15 Indicative of kidney failure.        eGFR   Amer --     Comment: <15 Indicative of kidney failure.        Globulin 3.6 gm/dL      A/G Ratio 0.9 g/dL      BUN/Creatinine Ratio 9.8     Anion Gap 12.8 mmol/L     Lactic Acid, Plasma [[id number]]  (Abnormal) Collected: 10/30/21 1513    Specimen: Blood Updated: 10/30/21 1603     Lactate 2.1 mmol/L     Troponin [[id number]]  (Normal) Collected: 10/30/21 1513    Specimen: Blood Updated: 10/30/21 1552     Troponin T <0.010 ng/mL     C-reactive Protein [[id number]]  (Abnormal) Collected: 10/30/21 1513    Specimen: Blood Updated: 10/30/21 1552     C-Reactive Protein 3.39 mg/dL     Magnesium [[id number]]  (Normal) Collected: 10/30/21 1513    Specimen: Blood Updated: 10/30/21 1552     Magnesium 2.1 mg/dL     aPTT [[id number]]  (Normal) Collected: 10/30/21 1513    Specimen: Blood Updated: 10/30/21 1551     PTT 33.2 seconds     D-dimer, Quantitative [[id number]]  (Abnormal) Collected: 10/30/21 1513    Specimen: Blood Updated: 10/30/21 1551     D-Dimer, Quantitative 0.90 MCGFEU/mL     Protime-INR [[id number]]  (Abnormal) Collected: 10/30/21 1513    Specimen: Blood Updated: 10/30/21 1551     Protime 14.8 Seconds      INR 1.12    BNP [[id number]]  (Normal) Collected: 10/30/21 1513    Specimen: Blood Updated: 10/30/21 1549     proBNP 714.7 pg/mL     CBC Auto Differential [[id number]]  (Abnormal) Collected: 10/30/21 1513    Specimen: Blood Updated: 10/30/21 1529     WBC 8.48 10*3/mm3      RBC 3.83 10*6/mm3      Hemoglobin 10.4 g/dL      Hematocrit 34.6 %      MCV 90.3 fL      MCH 27.2 pg      MCHC 30.1 g/dL      RDW 16.7 %      RDW-SD 55.2 fl      MPV 9.8 fL       Platelets 245 10*3/mm3      Neutrophil % 83.5 %      Lymphocyte % 9.1 %      Monocyte % 4.7 %      Eosinophil % 1.8 %      Basophil % 0.2 %      Immature Grans % 0.7 %      Neutrophils, Absolute 7.08 10*3/mm3      Lymphocytes, Absolute 0.77 10*3/mm3      Monocytes, Absolute 0.40 10*3/mm3      Eosinophils, Absolute 0.15 10*3/mm3      Basophils, Absolute 0.02 10*3/mm3      Immature Grans, Absolute 0.06 10*3/mm3      nRBC 0.0 /100 WBC     Blood Gas, Arterial With Co-Ox [[id number]]  (Abnormal) Collected: 10/30/21 1356    Specimen: Arterial Blood Updated: 10/30/21 1402     Site Left Brachial     Robert's Test N/A     pH, Arterial 7.359 pH units      pCO2, Arterial 37.5 mm Hg      pO2, Arterial 46.6 mm Hg      Comment: 85 Value below critical limit        HCO3, Arterial 21.1 mmol/L      Base Excess, Arterial -3.9 mmol/L      O2 Saturation, Arterial 82.5 %      Comment: 84 Value below reference range        Hemoglobin, Blood Gas 11.0 g/dL      Comment: 84 Value below reference range        Hematocrit, Blood Gas 33.6 %      Comment: 84 Value below reference range        Oxyhemoglobin 80.3 %      Comment: 84 Value below reference range        Methemoglobin 0.20 %      Carboxyhemoglobin 2.5 %      A-a Gradiant 51.4 mmHg      CO2 Content 22.3 mmol/L      Temperature 0.0 C      Barometric Pressure for Blood Gas 715 mmHg      Modality Room Air     FIO2 21 %      Ventilator Mode NA     Note --     Notified Who DR. LYON/RN     Notified By [id number]     Notified Time 10/30/2021 14:05     Collected by [id number]     Comment: Meter: [id number]     :  [id number]        pH, Temp Corrected --     pCO2, Temperature Corrected --     pO2, Temperature Corrected --          Imaging Results (All)     Procedure Component Value Units Date/Time    US Renal Bilateral [[id number]] Collected: 10/31/21 1218     Updated: 10/31/21 1220    Narrative:      UNILATERAL LEFT RENAL ULTRASOUND, 10/31/2021    HISTORY:  56-year-old female hospital  inpatient with acute respiratory failure, COVID-19 pneumonia. Acute kidney injury is noted. The technologist reports the patient has a solitary left kidney. Remote CT abdomen/pelvis in 2015 reportedly showed severe chronic  right renal atrophy.    TECHNIQUE:  Grayscale ultrasound imaging of the left kidney and urinary bladder was performed.    FINDINGS:  The left kidney is normal in size and ultrasound appearance, although somewhat poorly seen due to technical limitations. There is no hydronephrosis to suggest urinary obstruction on the left.    The urinary bladder is not imaged and may be empty. Reportedly atrophic right kidney not visible by ultrasound today.    Right renal length: Not seen.  Left renal length: 14.3 cm.      Impression:      1.  Negative left unilateral renal ultrasound examination. No evidence of urinary obstruction.  2.  No right kidney is visualized. It may be severely atrophic or absent correlate clinically.    Signer Name: Paulie Jensen MD   Signed: 10/31/2021 12:18 PM   Workstation Name: [email or web address]    [facility] Venous Doppler Lower Extremity Bilateral (duplex) [[id number]] Collected: 10/31/21 1211     Updated: 10/31/21 1213    Narrative:      VENOUS DOPPLER ULTRASOUND, BILATERAL LOWER EXTREMITY, 10/31/2021    HISTORY:   56-year-old female hospital inpatient with acute respiratory failure, pneumonia. Recent COVID-19 illness. D-dimer is elevated.    TECHNIQUE:   Venous Doppler ultrasound examination of both legs performed using grey-scale, spectral Doppler, and color flow Doppler ultrasound imaging. The protocol does not include the greater saphenous vein beyond the saphenofemoral junction or the anterior tibial  vein.    FINDINGS:   The examination is negative.  There is no evidence of deep venous thrombosis from the groin to the lower calf. Saphenofemoral junctions are patent.      Impression:      Negative examination.  No evidence of right or left  lower extremity DVT.    Signer Name: Paulie Jensen MD   Signed: 10/31/2021 12:11 PM   Workstation Name: [facility]    [facility]    CT Chest Without Contrast Diagnostic [[id number]] Collected: 10/30/21 1754     Updated: 10/30/21 1757    Narrative:      CT Chest WO    INDICATION:   Acute respiratory failure with hypoxia. Pneumonia.    TECHNIQUE:   CT of the thorax without IV contrast. Coronal and sagittal reconstructions were obtained.  Radiation dose reduction techniques included automated exposure control or exposure modulation based on body size. Count of known CT and cardiac nuc med studies  performed in previous 12 months: 1.     COMPARISON:   CT chest 10/9/2021    FINDINGS:  Extensive bilateral parenchymal opacities with progressive consolidation and continued areas of groundglass opacity. This is predominantly peripheral but involves upper and lower lobes. No effusions. Mild cardiomegaly. Calcified and noncalcified  mediastinal and hilar lymph nodes. Postcholecystectomy.      Impression:      Continued extensive bilateral parenchymal airspace disease with progressive consolidation of multiple areas of groundglass opacity particularly within the lower lung zones. Commonly reported imaging features of COVID-19 pneumonia are present. Other  processes such as influenza pneumonia and organizing pneumonia can cause a similar imaging pattern.    Signer Name: CEDRIC Adams MD   Signed: 10/30/2021 5:54 PM   Workstation [facility]: [facility]    [facility]    NM Lung Scan Perfusion Particulate [[id number]] Collected: 10/30/21 1458     Updated: 10/30/21 1501    Narrative:      EXAMINATION: NM LUNG SCAN PERFUSION PARTICULATE-      CLINICAL INDICATION: recent COVID, pleuritic chest pain, worsening SOB,  r/o PE; J96.01-Acute respiratory failure with hypoxia     TECHNIQUE:  3.8 mCi of Tc-99m MAA were administered IV for a perfusion  lung scan. Ventilation could not be  performed.     COMPARISON: Chest XRAY performed on same day; perfusion scan 10/09/2021.     FINDINGS: No interval change. No segmental type perfusion defects are  noted. Low suspicion of PE.       Impression:      Low probability of PE.      This report was finalized on 10/30/2021 2:59 PM by Dr. Michael Mojica MD.       XR Chest 2 View [[id number]] Collected: 10/30/21 1457     Updated: 10/30/21 1500    Narrative:      EXAM:    XR Chest, 2 Views     EXAM DATE:    10/30/2021 2:24 PM     CLINICAL HISTORY:    sob; J96.01-Acute respiratory failure with hypoxia     TECHNIQUE:    Frontal and lateral views of the chest.     COMPARISON:    10/09/2021     FINDINGS:    Lungs:  Overall interval worsening of diffuse bilateral airspace  disease which appears partially consolidative and is most consistent  with worsening pneumonia.  Low lung volumes.    Pleural space:  No evidence of pneumothorax.    Heart:  Cardiomegaly.    Mediastinum:  Unremarkable.    Bones/joints:  Unremarkable.       Impression:        Significant interval worsening of bilateral pneumonia.     This report was finalized on 10/30/2021 2:58 PM by Dr. Michael Mojica MD.             Orders (all)      Start     Ordered    11/01/21 0805  Inpatient Pulmonology Consult  Once        Specialty:  Pulmonary Disease  Provider:  (Not yet assigned)    11/01/21 0805    11/01/21 0746  Case Management  Consult  Once        Provider:  (Not yet assigned)    11/01/21 0746    10/31/21 0210  Inpatient Diabetes Educator Consult  Once        Provider:  (Not yet assigned)    10/31/21 0210    10/30/21 2000  Vital Signs  Every 4 Hours       10/30/21 1804    10/30/21 1815  Inpatient Nephrology Consult  Once        Specialty:  Nephrology  Provider:  Eusebio Duarte MD    10/30/21 1814    10/30/21 1805  Intake & Output  Every Shift       10/30/21 1804    10/30/21 1805  Weigh Patient  Once         10/30/21 1804    10/30/21 1805  Oxygen Therapy- Nasal Cannula; Titrate for SPO2:  90% - 95%  Continuous         10/30/21 1804    10/30/21 180  Insert Peripheral IV  Once         10/30/21 1804    10/30/21 180  VTE Prophylaxis Not Indicated: Other: Patient Currently Anticoagulated / Receiving Prophylaxis  Once         10/30/21 1804    10/30/21 180  Cardiac Monitoring  Continuous         10/30/21 1804    10/30/21 180  Activity - Ad Allegra  Until Discontinued         10/30/21 1804    10/30/21 180  Diet Regular; Cardiac, Consistent Carbohydrate, Renal  Diet Effective Now         10/30/21 1804    10/30/21 180  Inpatient Infectious Diseases Consult  Once        Specialty:  Infectious Diseases  Provider:  Leonidas Magaña MD    10/30/21 1804    10/30/21 1630  Code Status and Medical Interventions:  Continuous         10/30/21 1634    10/30/21 1628  Inpatient Admission  Once         10/30/21 1634                     Physician Progress Notes (all)      Stephan Bolton MD at 10/31/21 0952              [facility]     Patient Identification:  Name:  Jessica Bravo  Age:  56 y.o.  Sex:  female  :  1965  MRN:  [id number]  Visit Number:  [id number]  ROOM: [address]     Primary Care Provider:  Buck Wallis APRN    Length of stay in inpatient status:  1    Subjective     Chief Compliant:    Chief Complaint   Patient presents with   • Shortness of Breath   • Cough     History of Presenting Illness:    Patient remains ill, no acute events overnight, no new complaints, reports she has continued persisting dyspnea, cough present, dry, no sputum production, she denies any fevers or chills, Cr improved slightly to 3.1, Renal US pending, Nephrology consult pending, continued on Abx, ID consult pending, formal echo pending, Glc much improved, added long acting insulin tonight.    Objective       ----------------------------------------------------------------------------------------------------------------------  Vital Signs:  Temp:  [97.5 °F (36.4 °C)-99.3 °F (37.4 °C)]  98.6 °F (37 °C)  Heart Rate:  [] 85  Resp:  [14-24] 24  BP: (123-155)/() 141/79  SpO2:  [80 %-97 %] 94 %  on  Flow (L/min):  [3] 3;   Device (Oxygen Therapy): nasal cannula  Body mass index is 28.44 kg/m².    Wt Readings from Last 3 Encounters:   10/31/21 75.2 kg (165 lb 12.6 oz)   10/10/21 72.6 kg (160 lb)   02/05/20 78 kg (172 lb)     Intake & Output (last 3 days)       10/28 0701  10/29 0700 10/29 0701  10/30 0700 10/30 0701  10/31 0700 10/31 0701  11/01 0700    P.O.    225    IV Piggyback   1100     Total Intake(mL/kg)   1100 (14.6) 225 (3)    Urine (mL/kg/hr)    500 (2.3)    Total Output    500    Net   +1100 -275                Diet Regular; Cardiac, Consistent Carbohydrate, Renal  ----------------------------------------------------------------------------------------------------------------------  Physical exam:  Constitutional: Older than stated age,  No acute distress.      HENT:  Head:  Normocephalic and atraumatic.  Mouth:  Moist mucous membranes.    Eyes:  Conjunctivae and EOM are normal. No scleral icterus.    Neck:  Neck supple.  No JVD present.    Cardiovascular:  Normal rate, regular rhythm and normal heart sounds with no murmur.  Pulmonary/Chest:  No respiratory distress, no wheezes, on nasal cannula  Abdominal:  Soft. No distension and no tenderness.   Musculoskeletal:  No tenderness, and no deformity.    Neurological:  Alert and oriented to person, place, and time.  No gross focal deficits   Skin:  Skin is warm and dry. No rash noted. No pallor.   Peripheral vascular:  No clubbing, no cyanosis, no edema.  ----------------------------------------------------------------------------------------------------------------------    Assessment & Plan    -Acute hypoxemic respiratory failure 2/2 PNA, Bacterial, treating for MDR organisms  -Severe sepsis 2/2 HCAP, POA, with HR>90, RR>20, and lactate>2:   -Recent COVID-19 dx with PNA:   · Continuous cardiac monitoring.   · Continuous pulse  oximetry monitoring.   · Add SLP evaluation which is pending   · Continue IV Cefepime, vancomycin, and Flagyl for coverage of HCAP, cultures so far unrevealing  · Hospitalized both here and [facility] with COVID.   · Monitor vitals per hospital protocol.   · ID consulted and recs pending  · CT chest without contrast pending.      -ANA PAULA on CKD IV:   · Referred to [facility] for 2nd opinion regarding transplant evaluation after not listed by [facility] with concern for medical noncompliance  & poor social support.   · Continue follow-up with outpatient Neprhologist Dr. Araiza.   · US renal added and performed this AM but not read  · Avoid nephrotoxins when possible.   · Repeat AM BMP showed marginal improvement  · Nephrology consulted, recs pending     -Hyperglycemia with DM 2, ID:   -Pseudohyponatremia 2/2 hyperglycemia:   · Given diabetes mellitus, fingerstick blood glucose monitoring has been ordered AC&HS.   · Sliding scale insulin has been ordered PRN.   · Hemoglobin a1c 10.5 on 10/9/21.   · Patient takes regular insulin at home but given likely non-adherence will start on low dose Levemir this evening and titrate as indicated for hopeful improved once daily usage.      -Elevated d-dimer:   · VQ scan without known DVT.    · CT chest PE protocol not obtained given ANA PAULA on CKD IV.   · US venous doppler and was performed this AM but pending read  · Continue home Eliquis    #HTN/HLD  - Continue ASA 81, statin.    #Medical Non-Adherence  - Given patient's choices and actions to not adhere to medical treatments and recommendations, this will drastically affect their short and long term morbidity and mortality.      F: Oral  E: Monitor & Replace PRN  N: DM, Cardiac, Renal Diet  Ppx: on Eliquis  Code: Full Code    Dispo: Pending workup and clinical improvement    *This patient is considered high risk secondary to Sepsis, bacterial PNA, ANA PAULA on CKD.     VTE Prophylaxis:   Mechanical Order History:     None      Pharmalogical Order  History:      Ordered     Dose Route Frequency Stop    10/30/21 2014  apixaban (ELIQUIS) tablet 5 mg         5 mg PO Every 12 Hours Scheduled --              Stephan Bolton MD  [facility] Hospitalist  10/31/21  09:52 EDT    Electronically signed by Stephan Bolton MD at 10/31/21 0955          Consult Notes (all)      Eusebio Duarte MD at 10/31/21 1110      Consult Orders    1. Inpatient Nephrology Consult [[id number]] ordered by Stephan Bolton MD at 10/30/21 1814               Nephrology Consult Note    Referring Provider: Dr. Bolton  Reason for Consultation: ANA PAULA    Subjective       History of present illness:  Jessica Bravo is a 56 y.o. female who presented to [facility] emergency department with chief complaint of shortness of breath and has been admitted for hypoxic respiratory failure due to pneumonia.   Pt is known to have DM, HTN and CKD follows [facility] and has had dialysis in past, she was recently admitted with ANA PAULA on CKD with  Cr >4 that improved to ~2.0 on discharge, now this time is admitted with 3.2. Pt has history of non compliance and says she developed lower  Ext swelling due to K pills. Her shortness of breath has been improving since admission.   Pt denied any history  Chronic NSAIDS use. Patient denies hematuria, dysuria, difficulty passing urine. No prior history of renal stones. No family history of renal disease  Review of Systems  More than 10 point review of systems was done. Pertinent items are noted in HPI, all other systems reviewed and negative    Objective     Vital Signs  Temp:  [97.5 °F (36.4 °C)-99.3 °F (37.4 °C)] 98.6 °F (37 °C)  Heart Rate:  [] 85  Resp:  [14-24] 24  BP: (123-155)/() 141/79    I/O this shift:  In: 225 [P.O.:225]  Out: 500 [Urine:500]  I/O last 3 completed shifts:  In: 1100 [IV Piggyback:1100]  Out: -     Physical Examination:  General Appearance: not in acute distress  Head: Normocephalic, without obvious abnormality and  atraumatic  Throat: Oral mucosa moist  Neck: No JVD  Lungs: B/L scattered crackles   Heart: Regular rhythm & normal rate, normal S1, S2, no murmur, no gallop, no rub   Abdomen: Normal bowel sounds, no masses and soft non-tender  Extremities: no  edema  Pulses: Palpable and equal bilaterally  Skin: No rash  Neurologic: Orientated to person, place, time, grossly no focal deficitis    Laboratory Data :       Assessment/Plan       Acute respiratory failure with hypoxia (HCC)      1-Acute kidney injury on chronic kidney disease stage IV:   2-Hyponatremia, improving.  3-acute  Hypoxic respiratory failure due to pneumonia  4.Essential hypertension  5-metabolic acidosis  6-DM  7. Nephrotic range proteinuria likely due to DKD     Cr is 2.3, peaked 4.0. good urine output, oral intake is adquate, will DC IVF  I will sign off, please call if any questions. Pt should have follow up with her primary nephrologist after discharge.      ANA PAULA on CKD stage IV likely pre renal azotemia   Follows Dr. Araiza and was on dialysis for 3 times   4G proteinuria, no significant hematuria previously  During last hospitalization, Cr improved to 2.3, now admitted with 3.2.   -1/2NS with sodium bicarb 75meq/L @ 100mlh      Thanks Dr Bolton  for the consult. Nephrology will follow the patient.   I discussed the patient's findings and my recommendations with patient and nursing staff    Eusebio Duarte MD  10/31/21  11:10 EDT      Electronically signed by Eusebio Duarte MD at 10/31/21 1117     Leonidas Magaña MD at 10/31/21 1002      Consult Orders    1. Inpatient Infectious Diseases Consult [[id number]] ordered by Stephan Bolton MD at 10/30/21 1634                       INFECTIOUS DISEASE CONSULTATION REPORT        Patient Identification:  Name:  Jessica Bravo  Age:  56 y.o.  Sex:  female  :  1965  MRN:  [id number]   Visit Number:  [id number]  Primary Care Physician:  Buck Wallis APRN       LOS: 1 day        Subjective       Subjective      History of present illness:      Thank you Dr. Bolton for allowing us to participate in the care of your patient.  As you well know, Ms. Jessica Bravo is a 56 y.o. female with past medical history significant for CKD and eligible for transplant, diabetes, hypertension, COPD, chronic nausea and vomiting with concern for underlying gastroparesis, hyperparathyroidism, who presented to [facility] Emergency Department on 10/30/2021 for worsening shortness of breath.  Patient was recently hospitalized at our facility from 10/9/2021 through 10/15/2021 for sepsis secondary to COVID-19 pneumonia and was treated with a regimen of Levaquin, Flagyl, micafungin after concern for superimposed bacterial pneumonia and underlying UTI.  Levaquin and Flagyl p.o. course were to be finished on 10/20/2021.    WBC normal.  CRP 4.02.  Lactic acid 2.4.  Respiratory panel PCR negative.  MRSA PCR negative.  Legionella negative.  VQ scan from 10/30/2021 reports low probability of PE.  Chest x-ray from 10/30/2021 reports significant interval worsening of bilateral pneumonia.  CT of the chest from 10/30/2021 reports continued extensive bilateral parenchymal airspace disease with progressive consolidation in multiple areas of groundglass opacity particularly in the lower lung zones.      Infectious Disease consultation was requested for antimicrobial management.      ---------------------------------------------------------------------------------------------------------------------     Review Of Systems:    Constitutional: no fever, chills and night sweats. No appetite change or unexpected weight change. No fatigue.  Eyes: no eye drainage, itching or redness.  HEENT: no mouth sores, dysphagia or nose bleed.  Respiratory: Positive shortness of breath, cough. No production of sputum.  Cardiovascular: no chest pain, no palpitations, no orthopnea.  Gastrointestinal: no nausea, vomiting or diarrhea. No abdominal pain, hematemesis or rectal  bleeding.  Genitourinary: no dysuria or polyuria.  Hematologic/lymphatic: no lymph node abnormalities, no easy bruising or easy bleeding.  Musculoskeletal: no muscle or joint pain.  Skin: No rash and no itching.  Neurological: no loss of consciousness, no seizure, no headache.  Behavioral/Psych: no depression or suicidal ideation.  Endocrine: no hot flashes.  Immunologic: negative.    ---------------------------------------------------------------------------------------------------------------------       ---------------------------------------------------------------------------------------------------------------------    Objective       Objective        ---------------------------------------------------------------------------------------------------------------------   Vital Signs:  Temp:  [97.5 °F (36.4 °C)-99.3 °F (37.4 °C)] 98.6 °F (37 °C)  Heart Rate:  [] 85  Resp:  [14-24] 24  BP: (123-155)/() 141/79  Mean Arterial Pressure (Non-Invasive) for the past 24 hrs (Last 3 readings):   Noninvasive MAP (mmHg)   10/31/21 0900 107   10/31/21 0800 107   10/31/21 0700 108     SpO2 Percentage    10/31/21 0700 10/31/21 0800 10/31/21 0900   SpO2: 92% 92% 94%     SpO2:  [80 %-97 %] 94 %  on  Flow (L/min):  [3] 3;   Device (Oxygen Therapy): nasal cannula    Body mass index is 28.44 kg/m².  Wt Readings from Last 3 Encounters:   10/31/21 75.2 kg (165 lb 12.6 oz)   10/10/21 72.6 kg (160 lb)   02/05/20 78 kg (172 lb)     ---------------------------------------------------------------------------------------------------------------------     Physical Exam:    Constitutional: Chronically ill-appearing.  No respiratory distress.      HENT:  Head: Normocephalic and atraumatic.  Mouth:  Moist mucous membranes.    Eyes:  Conjunctivae and EOM are normal.  No scleral icterus.  Neck:  Neck supple.  No JVD present.    Cardiovascular:  Normal rate, regular rhythm and normal heart sounds with no murmur. No  edema.  Pulmonary/Chest: Diminished lung sounds bilaterally.  Abdominal:  Soft.  Bowel sounds are normal.  No distension and no tenderness.   Musculoskeletal:  No edema, no tenderness, and no deformity.  No swelling or redness of joints.  Neurological:  Alert and oriented to person, place, and time.  No facial droop.  No slurred speech.   Skin:  Skin is warm and dry.  No rash noted.  No pallor.   Psychiatric:  Normal mood and affect.  Behavior is normal.    ---------------------------------------------------------------------------------------------------------------------  ---------------------------------------------------------------------------------------------------------------------      Assessment & Plan        Assessment/Plan       ASSESSMENT:    1.  Severe sepsis with lactic acid greater than 2 on admission  2.  Bilateral pneumonia      PLAN:    Patient presents with worsening shortness of breath.  Patient was recently hospitalized at our facility from 10/9/2021 through 10/15/2021 for sepsis secondary to COVID-19 pneumonia and was treated with a regimen of Levaquin, Flagyl, micafungin after concern for superimposed bacterial pneumonia and underlying UTI.  Levaquin and Flagyl p.o. course were to be finished on 10/20/2021.    WBC normal.  CRP 4.02.  Lactic acid 2.4.  Respiratory panel PCR negative.  MRSA PCR negative.  Legionella negative.  VQ scan from 10/30/2021 reports low probability of PE.  Chest x-ray from 10/30/2021 reports significant interval worsening of bilateral pneumonia.  CT of the chest from 10/30/2021 reports continued extensive bilateral parenchymal airspace disease with progressive consolidation in multiple areas of groundglass opacity particularly in the lower lung zones.    In the setting of persistently worsening pneumonia despite adequate treatment recommend bronchoscopy for further diagnostic purposes.  For now recommend to continue vancomycin pharmacy to dose and cefepime pharmacy to  dose empirically.  We will continue to follow closely and adjust antibiotic therapy as needed.    Again, thank you Dr. Bolton for allowing us to participate in the care of your patient and please feel free to call for any questions you may have.        Code Status:     Code Status and Medical Interventions:   Ordered at: 10/30/21 3110     Code Status:    CPR     Medical Interventions (Level of Support Prior to Arrest):    Full         WISAM Robin  10/31/21  10:03 EDT    Electronically signed by Leonidas Magaña MD at 10/31/21 4627

## 2021-11-01 NOTE — CONSULTS
"Diabetes Education  Assessment/Teaching    Patient Name:  Jessica Bravo  YOB: 1965  MRN: [id number]  Admit Date:  10/30/2021      Assessment Date:  11/1/2021    Most Recent Value   General Information     Height 162.6 cm (64.02\")   Height Method Stated   Weight 74.8 kg (164 lb 12.8 oz)   Weight Method Bed scale   Pregnancy Assessment    Diabetes History    Education Preferences    Nutrition Information    Assessment Topics    DM Goals             Most Recent Value   DM Education Needs    Meter Has own   Frequency of Testing --  [CGM]   Medication Insulin   Problem Solving Hypoglycemia,  Hyperglycemia,  Sick days,  Signs,  Symptoms,  Treatment   Reducing Risks Retinopathy,  Neuropathy,  Cardiovascular,  Immunizations,  Foot care,  Dental exam,  Eye exam,  Blood pressure,  Lipids,  A1C testing   Healthy Eating Basic meal plan provided   Physical Activity Walking   Physical Activity Frequency Occasionally   Healthy Coping Appropriate   Discharge Plan Home,  Follow-up with PCP   Motivation Moderate   Teaching Method Explanation,  Discussion,  Handouts   Patient Response Verbalized understanding            Other Comments:  A1C 10.5 Patient did not wear a mask. Patient was seen, educated and received ADA handouts on diet, activity, checking blood glucose, taking medication as prescribed, checking feet daily and S/S of hypo/hyperglycemia. Patient was educated on sick rule days. Patient was shown blood glucose sticks and the effects her blood glucose has on her body. Patient also states the medication she took for covid and how sick she was had made her A1C higher.  Patient had no questions or concerns. Please re-consult or call for concerns or questions. Thank you.        Electronically signed by:  Roberta Rivera RN  11/01/21 12:39 EDT  "

## 2021-11-02 ENCOUNTER — ANESTHESIA EVENT (OUTPATIENT)
Dept: PERIOP | Facility: HOSPITAL | Age: 56
End: 2021-11-02

## 2021-11-02 ENCOUNTER — ANESTHESIA (OUTPATIENT)
Dept: PERIOP | Facility: HOSPITAL | Age: 56
End: 2021-11-02

## 2021-11-02 LAB
ALBUMIN SERPL-MCNC: 2.71 G/DL (ref 3.5–5.2)
ALBUMIN/GLOB SERPL: 0.8 G/DL
ALP SERPL-CCNC: 88 U/L (ref 39–117)
ALT SERPL W P-5'-P-CCNC: 5 U/L (ref 1–33)
ANION GAP SERPL CALCULATED.3IONS-SCNC: 11.4 MMOL/L (ref 5–15)
ANION GAP SERPL CALCULATED.3IONS-SCNC: 8.7 MMOL/L (ref 5–15)
AST SERPL-CCNC: 9 U/L (ref 1–32)
B PARAPERT DNA SPEC QL NAA+PROBE: NOT DETECTED
B PERT DNA SPEC QL NAA+PROBE: NOT DETECTED
BILIRUB SERPL-MCNC: 0.2 MG/DL (ref 0–1.2)
BUN SERPL-MCNC: 36 MG/DL (ref 6–20)
BUN SERPL-MCNC: 37 MG/DL (ref 6–20)
BUN/CREAT SERPL: 12 (ref 7–25)
BUN/CREAT SERPL: 12.2 (ref 7–25)
C PNEUM DNA NPH QL NAA+NON-PROBE: NOT DETECTED
C3 SERPL-MCNC: 119 MG/DL (ref 82–167)
C4 SERPL-MCNC: 30 MG/DL (ref 12–38)
CALCIUM SPEC-SCNC: 8.3 MG/DL (ref 8.6–10.5)
CALCIUM SPEC-SCNC: 9.4 MG/DL (ref 8.6–10.5)
CENTROMERE B AB SER-ACNC: <0.2 AI (ref 0–0.9)
CHLORIDE SERPL-SCNC: 106 MMOL/L (ref 98–107)
CHLORIDE SERPL-SCNC: 108 MMOL/L (ref 98–107)
CHROMATIN AB SERPL-ACNC: <0.2 AI (ref 0–0.9)
CO2 SERPL-SCNC: 19.3 MMOL/L (ref 22–29)
CO2 SERPL-SCNC: 23.6 MMOL/L (ref 22–29)
CREAT SERPL-MCNC: 2.94 MG/DL (ref 0.57–1)
CREAT SERPL-MCNC: 3.09 MG/DL (ref 0.57–1)
CRP SERPL-MCNC: 5.82 MG/DL (ref 0–0.5)
DEPRECATED RDW RBC AUTO: 50.9 FL (ref 37–54)
DSDNA AB SER-ACNC: 1 IU/ML (ref 0–9)
DSDNA AB SER-ACNC: 1 IU/ML (ref 0–9)
ENA JO1 AB SER-ACNC: <0.2 AI (ref 0–0.9)
ENA RNP AB SER-ACNC: 1.1 AI (ref 0–0.9)
ENA SCL70 AB SER-ACNC: <0.2 AI (ref 0–0.9)
ENA SM AB SER-ACNC: <0.2 AI (ref 0–0.9)
ENA SS-A AB SER-ACNC: 2 AI (ref 0–0.9)
ENA SS-B AB SER-ACNC: <0.2 AI (ref 0–0.9)
ERYTHROCYTE [DISTWIDTH] IN BLOOD BY AUTOMATED COUNT: 15.8 % (ref 12.3–15.4)
ERYTHROCYTE [SEDIMENTATION RATE] IN BLOOD: 65 MM/HR (ref 0–30)
FLUAV SUBTYP SPEC NAA+PROBE: NOT DETECTED
FLUBV RNA ISLT QL NAA+PROBE: NOT DETECTED
GFR SERPL CREATININE-BSD FRML MDRD: 15 ML/MIN/1.73
GFR SERPL CREATININE-BSD FRML MDRD: 16 ML/MIN/1.73
GLOBULIN UR ELPH-MCNC: 3.6 GM/DL
GLUCOSE BLDC GLUCOMTR-MCNC: 107 MG/DL (ref 70–130)
GLUCOSE BLDC GLUCOMTR-MCNC: 385 MG/DL (ref 70–130)
GLUCOSE BLDC GLUCOMTR-MCNC: 415 MG/DL (ref 70–130)
GLUCOSE BLDC GLUCOMTR-MCNC: 422 MG/DL (ref 70–130)
GLUCOSE SERPL-MCNC: 474 MG/DL (ref 65–99)
GLUCOSE SERPL-MCNC: 63 MG/DL (ref 65–99)
HADV DNA SPEC NAA+PROBE: NOT DETECTED
HCOV 229E RNA SPEC QL NAA+PROBE: NOT DETECTED
HCOV HKU1 RNA SPEC QL NAA+PROBE: NOT DETECTED
HCOV NL63 RNA SPEC QL NAA+PROBE: NOT DETECTED
HCOV OC43 RNA SPEC QL NAA+PROBE: NOT DETECTED
HCT VFR BLD AUTO: 30.6 % (ref 34–46.6)
HGB BLD-MCNC: 9.3 G/DL (ref 12–15.9)
HMPV RNA NPH QL NAA+NON-PROBE: NOT DETECTED
HPIV1 RNA SPEC QL NAA+PROBE: NOT DETECTED
HPIV2 RNA SPEC QL NAA+PROBE: NOT DETECTED
HPIV3 RNA NPH QL NAA+PROBE: NOT DETECTED
HPIV4 P GENE NPH QL NAA+PROBE: NOT DETECTED
Lab: ABNORMAL
M PNEUMO IGG SER IA-ACNC: NOT DETECTED
MCH RBC QN AUTO: 26.8 PG (ref 26.6–33)
MCHC RBC AUTO-ENTMCNC: 30.4 G/DL (ref 31.5–35.7)
MCV RBC AUTO: 88.2 FL (ref 79–97)
PLATELET # BLD AUTO: 274 10*3/MM3 (ref 140–450)
PMV BLD AUTO: 9.5 FL (ref 6–12)
POTASSIUM SERPL-SCNC: 4.2 MMOL/L (ref 3.5–5.2)
POTASSIUM SERPL-SCNC: 5.8 MMOL/L (ref 3.5–5.2)
PROT SERPL-MCNC: 6.3 G/DL (ref 6–8.5)
RBC # BLD AUTO: 3.47 10*6/MM3 (ref 3.77–5.28)
RHINOVIRUS RNA SPEC NAA+PROBE: NOT DETECTED
RSV RNA NPH QL NAA+NON-PROBE: NOT DETECTED
SODIUM SERPL-SCNC: 134 MMOL/L (ref 136–145)
SODIUM SERPL-SCNC: 143 MMOL/L (ref 136–145)
VANCOMYCIN SERPL-MCNC: 15.8 MCG/ML (ref 5–40)
WBC # BLD AUTO: 7.26 10*3/MM3 (ref 3.4–10.8)

## 2021-11-02 PROCEDURE — 25010000002 CALCIUM GLUCONATE-NACL 1-0.675 GM/50ML-% SOLUTION: Performed by: STUDENT IN AN ORGANIZED HEALTH CARE EDUCATION/TRAINING PROGRAM

## 2021-11-02 PROCEDURE — 87205 SMEAR GRAM STAIN: CPT | Performed by: INTERNAL MEDICINE

## 2021-11-02 PROCEDURE — 94799 UNLISTED PULMONARY SVC/PX: CPT

## 2021-11-02 PROCEDURE — 87102 FUNGUS ISOLATION CULTURE: CPT | Performed by: INTERNAL MEDICINE

## 2021-11-02 PROCEDURE — 25010000002 FENTANYL CITRATE (PF) 50 MCG/ML SOLUTION: Performed by: NURSE ANESTHETIST, CERTIFIED REGISTERED

## 2021-11-02 PROCEDURE — 25010000002 PROPOFOL 10 MG/ML EMULSION: Performed by: NURSE ANESTHETIST, CERTIFIED REGISTERED

## 2021-11-02 PROCEDURE — 87071 CULTURE AEROBIC QUANT OTHER: CPT | Performed by: INTERNAL MEDICINE

## 2021-11-02 PROCEDURE — 82962 GLUCOSE BLOOD TEST: CPT

## 2021-11-02 PROCEDURE — 80202 ASSAY OF VANCOMYCIN: CPT

## 2021-11-02 PROCEDURE — 25010000002 CEFEPIME PER 500 MG: Performed by: INTERNAL MEDICINE

## 2021-11-02 PROCEDURE — 63710000001 INSULIN ASPART PER 5 UNITS: Performed by: INTERNAL MEDICINE

## 2021-11-02 PROCEDURE — 0BJ08ZZ INSPECTION OF TRACHEOBRONCHIAL TREE, VIA NATURAL OR ARTIFICIAL OPENING ENDOSCOPIC: ICD-10-PCS | Performed by: INTERNAL MEDICINE

## 2021-11-02 PROCEDURE — 94640 AIRWAY INHALATION TREATMENT: CPT

## 2021-11-02 PROCEDURE — 99232 SBSQ HOSP IP/OBS MODERATE 35: CPT | Performed by: INTERNAL MEDICINE

## 2021-11-02 PROCEDURE — 85027 COMPLETE CBC AUTOMATED: CPT | Performed by: INTERNAL MEDICINE

## 2021-11-02 PROCEDURE — 63710000001 PREDNISONE PER 1 MG: Performed by: INTERNAL MEDICINE

## 2021-11-02 PROCEDURE — 87281 PNEUMOCYSTIS CARINII AG IF: CPT | Performed by: INTERNAL MEDICINE

## 2021-11-02 PROCEDURE — 80053 COMPREHEN METABOLIC PANEL: CPT | Performed by: INTERNAL MEDICINE

## 2021-11-02 PROCEDURE — 85652 RBC SED RATE AUTOMATED: CPT | Performed by: STUDENT IN AN ORGANIZED HEALTH CARE EDUCATION/TRAINING PROGRAM

## 2021-11-02 PROCEDURE — 63710000001 INSULIN REGULAR HUMAN PER 5 UNITS: Performed by: STUDENT IN AN ORGANIZED HEALTH CARE EDUCATION/TRAINING PROGRAM

## 2021-11-02 PROCEDURE — 99233 SBSQ HOSP IP/OBS HIGH 50: CPT | Performed by: STUDENT IN AN ORGANIZED HEALTH CARE EDUCATION/TRAINING PROGRAM

## 2021-11-02 PROCEDURE — 25010000002 ONDANSETRON PER 1 MG: Performed by: NURSE ANESTHETIST, CERTIFIED REGISTERED

## 2021-11-02 PROCEDURE — 86140 C-REACTIVE PROTEIN: CPT | Performed by: INTERNAL MEDICINE

## 2021-11-02 PROCEDURE — 88112 CYTOPATH CELL ENHANCE TECH: CPT | Performed by: INTERNAL MEDICINE

## 2021-11-02 PROCEDURE — 63710000001 INSULIN ASPART PER 5 UNITS: Performed by: HOSPITALIST

## 2021-11-02 PROCEDURE — 87633 RESP VIRUS 12-25 TARGETS: CPT | Performed by: INTERNAL MEDICINE

## 2021-11-02 PROCEDURE — 63710000001 INSULIN DETEMIR PER 5 UNITS: Performed by: INTERNAL MEDICINE

## 2021-11-02 PROCEDURE — 87116 MYCOBACTERIA CULTURE: CPT | Performed by: INTERNAL MEDICINE

## 2021-11-02 PROCEDURE — 31624 DX BRONCHOSCOPE/LAVAGE: CPT | Performed by: INTERNAL MEDICINE

## 2021-11-02 PROCEDURE — 87206 SMEAR FLUORESCENT/ACID STAI: CPT | Performed by: INTERNAL MEDICINE

## 2021-11-02 RX ORDER — SODIUM CHLORIDE, SODIUM LACTATE, POTASSIUM CHLORIDE, CALCIUM CHLORIDE 600; 310; 30; 20 MG/100ML; MG/100ML; MG/100ML; MG/100ML
125 INJECTION, SOLUTION INTRAVENOUS ONCE
Status: DISCONTINUED | OUTPATIENT
Start: 2021-11-02 | End: 2021-11-02 | Stop reason: HOSPADM

## 2021-11-02 RX ORDER — MIDAZOLAM HYDROCHLORIDE 1 MG/ML
1 INJECTION INTRAMUSCULAR; INTRAVENOUS
Status: DISCONTINUED | OUTPATIENT
Start: 2021-11-02 | End: 2021-11-02 | Stop reason: HOSPADM

## 2021-11-02 RX ORDER — GLYCOPYRROLATE 0.2 MG/ML
0.3 INJECTION INTRAMUSCULAR; INTRAVENOUS ONCE
Status: COMPLETED | OUTPATIENT
Start: 2021-11-02 | End: 2021-11-02

## 2021-11-02 RX ORDER — IPRATROPIUM BROMIDE AND ALBUTEROL SULFATE 2.5; .5 MG/3ML; MG/3ML
3 SOLUTION RESPIRATORY (INHALATION) ONCE AS NEEDED
Status: COMPLETED | OUTPATIENT
Start: 2021-11-02 | End: 2021-11-02

## 2021-11-02 RX ORDER — DROPERIDOL 2.5 MG/ML
0.62 INJECTION, SOLUTION INTRAMUSCULAR; INTRAVENOUS ONCE AS NEEDED
Status: DISCONTINUED | OUTPATIENT
Start: 2021-11-02 | End: 2021-11-02 | Stop reason: HOSPADM

## 2021-11-02 RX ORDER — SODIUM CHLORIDE 0.9 % (FLUSH) 0.9 %
10 SYRINGE (ML) INJECTION EVERY 12 HOURS SCHEDULED
Status: DISCONTINUED | OUTPATIENT
Start: 2021-11-02 | End: 2021-11-02 | Stop reason: HOSPADM

## 2021-11-02 RX ORDER — ONDANSETRON 2 MG/ML
4 INJECTION INTRAMUSCULAR; INTRAVENOUS AS NEEDED
Status: DISCONTINUED | OUTPATIENT
Start: 2021-11-02 | End: 2021-11-02 | Stop reason: HOSPADM

## 2021-11-02 RX ORDER — CALCIUM GLUCONATE 20 MG/ML
1 INJECTION, SOLUTION INTRAVENOUS ONCE
Status: COMPLETED | OUTPATIENT
Start: 2021-11-02 | End: 2021-11-02

## 2021-11-02 RX ORDER — LIDOCAINE HYDROCHLORIDE 20 MG/ML
INJECTION, SOLUTION INFILTRATION; PERINEURAL AS NEEDED
Status: DISCONTINUED | OUTPATIENT
Start: 2021-11-02 | End: 2021-11-02 | Stop reason: SURG

## 2021-11-02 RX ORDER — FENTANYL CITRATE 50 UG/ML
INJECTION, SOLUTION INTRAMUSCULAR; INTRAVENOUS AS NEEDED
Status: DISCONTINUED | OUTPATIENT
Start: 2021-11-02 | End: 2021-11-02 | Stop reason: SURG

## 2021-11-02 RX ORDER — SODIUM CHLORIDE 9 MG/ML
INJECTION, SOLUTION INTRAVENOUS CONTINUOUS PRN
Status: COMPLETED | OUTPATIENT
Start: 2021-11-02 | End: 2021-11-02

## 2021-11-02 RX ORDER — ONDANSETRON 2 MG/ML
INJECTION INTRAMUSCULAR; INTRAVENOUS AS NEEDED
Status: DISCONTINUED | OUTPATIENT
Start: 2021-11-02 | End: 2021-11-02 | Stop reason: SURG

## 2021-11-02 RX ORDER — SODIUM CHLORIDE, SODIUM LACTATE, POTASSIUM CHLORIDE, CALCIUM CHLORIDE 600; 310; 30; 20 MG/100ML; MG/100ML; MG/100ML; MG/100ML
100 INJECTION, SOLUTION INTRAVENOUS ONCE AS NEEDED
Status: DISCONTINUED | OUTPATIENT
Start: 2021-11-02 | End: 2021-11-02 | Stop reason: HOSPADM

## 2021-11-02 RX ORDER — PROPOFOL 10 MG/ML
VIAL (ML) INTRAVENOUS AS NEEDED
Status: DISCONTINUED | OUTPATIENT
Start: 2021-11-02 | End: 2021-11-02 | Stop reason: SURG

## 2021-11-02 RX ORDER — FAMOTIDINE 10 MG/ML
INJECTION, SOLUTION INTRAVENOUS AS NEEDED
Status: DISCONTINUED | OUTPATIENT
Start: 2021-11-02 | End: 2021-11-02 | Stop reason: SURG

## 2021-11-02 RX ORDER — SODIUM CHLORIDE 9 MG/ML
INJECTION, SOLUTION INTRAVENOUS CONTINUOUS PRN
Status: DISCONTINUED | OUTPATIENT
Start: 2021-11-02 | End: 2021-11-02 | Stop reason: SURG

## 2021-11-02 RX ORDER — FENTANYL CITRATE 50 UG/ML
50 INJECTION, SOLUTION INTRAMUSCULAR; INTRAVENOUS
Status: DISCONTINUED | OUTPATIENT
Start: 2021-11-02 | End: 2021-11-02 | Stop reason: HOSPADM

## 2021-11-02 RX ORDER — SODIUM CHLORIDE 0.9 % (FLUSH) 0.9 %
10 SYRINGE (ML) INJECTION AS NEEDED
Status: DISCONTINUED | OUTPATIENT
Start: 2021-11-02 | End: 2021-11-02 | Stop reason: HOSPADM

## 2021-11-02 RX ORDER — LIDOCAINE HYDROCHLORIDE AND EPINEPHRINE 10; 10 MG/ML; UG/ML
INJECTION, SOLUTION INFILTRATION; PERINEURAL AS NEEDED
Status: DISCONTINUED | OUTPATIENT
Start: 2021-11-02 | End: 2021-11-02 | Stop reason: HOSPADM

## 2021-11-02 RX ORDER — OXYCODONE HYDROCHLORIDE AND ACETAMINOPHEN 5; 325 MG/1; MG/1
1 TABLET ORAL ONCE AS NEEDED
Status: DISCONTINUED | OUTPATIENT
Start: 2021-11-02 | End: 2021-11-02 | Stop reason: HOSPADM

## 2021-11-02 RX ADMIN — FLUTICASONE PROPIONATE 1 SPRAY: 50 SPRAY, METERED NASAL at 20:46

## 2021-11-02 RX ADMIN — ASPIRIN 81 MG: 81 TABLET, COATED ORAL at 14:48

## 2021-11-02 RX ADMIN — INSULIN DETEMIR 10 UNITS: 100 INJECTION, SOLUTION SUBCUTANEOUS at 21:34

## 2021-11-02 RX ADMIN — VANCOMYCIN HYDROCHLORIDE 1000 MG: 1 INJECTION, POWDER, LYOPHILIZED, FOR SOLUTION INTRAVENOUS at 16:43

## 2021-11-02 RX ADMIN — CEFEPIME HYDROCHLORIDE 2 G: 2 INJECTION, POWDER, FOR SOLUTION INTRAVENOUS at 17:48

## 2021-11-02 RX ADMIN — ONDANSETRON 4 MG: 2 INJECTION INTRAMUSCULAR; INTRAVENOUS at 12:30

## 2021-11-02 RX ADMIN — IPRATROPIUM BROMIDE AND ALBUTEROL SULFATE 3 ML: .5; 3 SOLUTION RESPIRATORY (INHALATION) at 13:00

## 2021-11-02 RX ADMIN — PROPOFOL 20 MG: 10 INJECTION, EMULSION INTRAVENOUS at 12:34

## 2021-11-02 RX ADMIN — SODIUM CHLORIDE, PRESERVATIVE FREE 10 ML: 5 INJECTION INTRAVENOUS at 20:46

## 2021-11-02 RX ADMIN — HYDROCODONE BITARTRATE AND ACETAMINOPHEN 10 ML: 7.5; 325 SOLUTION ORAL at 14:48

## 2021-11-02 RX ADMIN — FLUTICASONE PROPIONATE 1 SPRAY: 50 SPRAY, METERED NASAL at 09:23

## 2021-11-02 RX ADMIN — PREDNISONE 40 MG: 20 TABLET ORAL at 14:47

## 2021-11-02 RX ADMIN — AMLODIPINE BESYLATE 5 MG: 5 TABLET ORAL at 14:48

## 2021-11-02 RX ADMIN — PROPOFOL 20 MG: 10 INJECTION, EMULSION INTRAVENOUS at 12:40

## 2021-11-02 RX ADMIN — ATORVASTATIN CALCIUM 40 MG: 40 TABLET, FILM COATED ORAL at 14:48

## 2021-11-02 RX ADMIN — BENZONATATE 100 MG: 100 CAPSULE ORAL at 15:43

## 2021-11-02 RX ADMIN — SODIUM BICARBONATE 50 MEQ: 84 INJECTION, SOLUTION INTRAVENOUS at 09:17

## 2021-11-02 RX ADMIN — INSULIN ASPART 14 UNITS: 100 INJECTION, SOLUTION INTRAVENOUS; SUBCUTANEOUS at 16:46

## 2021-11-02 RX ADMIN — SODIUM BICARBONATE 100 ML/HR: 84 INJECTION, SOLUTION INTRAVENOUS at 08:47

## 2021-11-02 RX ADMIN — PANTOPRAZOLE SODIUM 40 MG: 40 TABLET, DELAYED RELEASE ORAL at 14:48

## 2021-11-02 RX ADMIN — HUMAN INSULIN 10 UNITS: 100 INJECTION, SOLUTION SUBCUTANEOUS at 08:46

## 2021-11-02 RX ADMIN — SODIUM CHLORIDE, PRESERVATIVE FREE 10 ML: 5 INJECTION INTRAVENOUS at 08:46

## 2021-11-02 RX ADMIN — GLYCOPYRROLATE 0.3 MG: 0.2 INJECTION INTRAMUSCULAR; INTRAVENOUS at 12:07

## 2021-11-02 RX ADMIN — SODIUM CHLORIDE: 9 INJECTION, SOLUTION INTRAVENOUS at 12:30

## 2021-11-02 RX ADMIN — LIDOCAINE HYDROCHLORIDE 60 MG: 20 INJECTION, SOLUTION INFILTRATION; PERINEURAL at 12:30

## 2021-11-02 RX ADMIN — PROPOFOL 20 MG: 10 INJECTION, EMULSION INTRAVENOUS at 12:37

## 2021-11-02 RX ADMIN — HYDROXYZINE HYDROCHLORIDE 25 MG: 25 TABLET ORAL at 20:46

## 2021-11-02 RX ADMIN — INSULIN ASPART 14 UNITS: 100 INJECTION, SOLUTION INTRAVENOUS; SUBCUTANEOUS at 06:30

## 2021-11-02 RX ADMIN — SODIUM BICARBONATE TAB 650 MG 650 MG: 650 TAB at 20:46

## 2021-11-02 RX ADMIN — FENTANYL CITRATE 100 MCG: 50 INJECTION INTRAMUSCULAR; INTRAVENOUS at 12:30

## 2021-11-02 RX ADMIN — CALCIUM GLUCONATE 1 G: 20 INJECTION, SOLUTION INTRAVENOUS at 08:47

## 2021-11-02 RX ADMIN — APIXABAN 5 MG: 5 TABLET, FILM COATED ORAL at 20:46

## 2021-11-02 RX ADMIN — MONTELUKAST SODIUM 10 MG: 10 TABLET, COATED ORAL at 20:46

## 2021-11-02 RX ADMIN — LINACLOTIDE 145 MCG: 145 CAPSULE, GELATIN COATED ORAL at 14:47

## 2021-11-02 RX ADMIN — FAMOTIDINE 20 MG: 10 INJECTION INTRAVENOUS at 12:30

## 2021-11-02 NOTE — ANESTHESIA PREPROCEDURE EVALUATION
Anesthesia Evaluation     Patient summary reviewed                Airway   Dental      Pulmonary    Cardiovascular       ROS comment: 10/31/21 Echo  Interpretation Summary    · Left ventricular wall thickness is consistent with borderline concentric hypertrophy.  · Estimated left ventricular EF = 60% Left ventricular ejection fraction appears to be 56 - 60%. Left ventricular systolic function is normal.  · Trace MR is present. Aortic valve is unremarkable  · No pericardial effusion  · No prev study         Neuro/Psych  GI/Hepatic/Renal/Endo      Musculoskeletal     Abdominal    Substance History      OB/GYN          Other                 [facility]    Reason for Consult: persistent pneumonia    HPI: Mrs. Bravo is a 57yo WF with ESRD (due to diabetes).  She had COVID at the beginning of October.  She returned home but says he had persistent shortness of breath at home, worse with exertion.  She says it is severe, happens with minimal exertion.  She thinks it got worse after she left the hospital.  She has no chest pain. She has some productive cough with clearish sputum, mild in nature.  She is a lifelong nonsmoker.  She has no exposure history that she is aware of and not personal history of autoimmune disease.  She reports she complained of shortness of breath and chest imaging was obtained showing persistent infiltrate so she was told to come to the hospital.    Past Medical History:   Diagnosis Date   • Chronic kidney disease     only has one kidney, has been in [facility] 3 times since last visit    • ESRD on hemodialysis (HCC)    • Essential hypertension    • Hepatic steatosis    • History of noncompliance with medical treatment    • Pre-transplant evaluation for kidney transplant 03/31/2021    Declined by the [facility]   • Single kidney    • Type 2 diabetes mellitus (HCC)      Social History     Socioeconomic History   • Marital status: Single   Tobacco Use   • Smoking status: Never  Smoker   • Smokeless tobacco: Never Used   Substance and Sexual Activity   • Alcohol use: No   • Drug use: No   • Sexual activity: Defer     Family History   Problem Relation Age of Onset   • Diabetes Mother    • Hypertension Mother    • Diabetes Father    • Hypertension Father    • Diabetes Sister    • Hypertension Sister    • Diabetes Brother      MEDS: reviewed as per chart  ALL: list of 4, reviewed as per chart  ROS: 12 point negative except as in HPI    Vital Sign Min/Max for last 24 hours  Temp  Min: 97.8 °F (36.6 °C)  Max: 98.3 °F (36.8 °C)   BP  Min: 133/57  Max: 174/89   Pulse  Min: 81  Max: 103   Resp  Min: 14  Max: 20   SpO2  Min: 90 %  Max: 97 %   Flow (L/min)  Min: 3  Max: 3   Weight  Min: 73.6 kg (162 lb 4.8 oz)  Max: 73.6 kg (162 lb 4.8 oz)     GEN:   appears ill,  AxOx3  HEENT: PERRL, EOMI, no icterus, mmm, no jvd, trachea midline, neck supple  CHEST: decreased ae bilat, no wheezes, no crackles, no use of accessory muscles  CV: RRR, no m/g/r  ABD: soft, nt, nd +bs, no hepatosplenomegaly  EXT: no c/c/e  SKIN: no rashes, no xanthomas, nl turgor, warm, dry  LYMPH: no palpable cervical or supraclavicular lymphadenopathy  NEURO: CN 2-12 intact and symmetric bilaterally  PSYCH: nl affect, nl orientation, nl judgement, nl mood  MKS: no kyphoscoliosis, 5/5 strength ue and le bilaterally     Labs; 11/1: reviewed:  Wbc 8  hgb 9.7  plts 248  Glucose 228  Bun 32  Cr 3.4  Bicarb 19.7  crp 5.9  Wbc 9  hgb 9.7  plts 237    Ct chest: 10/30: reviewed images: extensive bilateral infiltrates, actually look to be slightly improved to my read when compared with prior.  10/31: cardiac echo: EF 60%    A/P:  1. Acute hypoxemic respiratory failure -- suspect she will remain oxygen dependent for sometime, possible indefinately.  Unclear how much of her current ct finding represent permanent post COVID fibrosis vs. Reversible inflammation or other process  2. Persistent pneumonia -- explained DDX includes infectious causes,  organizing pneumonia, Autoimmune processes, and/or post covid fibrosis/inflammation.  Explained best way to rule out ongoing infection would be bronchoscopy.  Explained bronchoscopy procedure with sedation including low risks of anestesia, bleeding and lung puncture.  She does not wish bronchoscopy.  I that case will check serology and start on emperic steroids.  3. ESRD  4. Anemia    Patient refuses bronchoscopy.  Will check serology and start prednisone 40mg a day with plans to continue that for 3 weeks and wean slowly at that point, best should be accomplished with close outpatient follow up with pulmonary with pft and ct as needed to assist in treatment.     Spoke with Dr. Alberto, patient now agreeable to bronchoscopy, [facility]         Anesthesia Plan

## 2021-11-02 NOTE — PROGRESS NOTES
Patient initiated on vancomycin for PNA. Patient's SCr is elevated at 2.94 mg/dL today. Will give patient 1000 mg x1 vancomycin today due to random level 15.8 . Will order a random vancomycin level when appropriate to assess for further dosing and continue to follow.    Thanks  Lorena Adams, PharmD

## 2021-11-02 NOTE — CASE MANAGEMENT/SOCIAL WORK
Discharge Planning Assessment   [facility]     Patient Name: Jessica Bravo  MRN: [id number]  Today's Date: 11/2/2021    Admit Date: 10/30/2021     Discharge Needs Assessment     Row Name 11/02/21 1652       Living Environment    Lives With significant other    Current Living Arrangements home/apartment/condo    Primary Care Provided by self    Provides Primary Care For no one    Family Caregiver if Needed none       Resource/Environmental Concerns    Resource/Environmental Concerns none    Transportation Concerns car, none       Transition Planning    Patient/Family Anticipates Transition to home with family    Transportation Anticipated family or friend will provide       Discharge Needs Assessment    Equipment Currently Used at Home walker, rolling; cane, straight; oxygen               Discharge Plan     Row Name 11/02/21 1653       Plan    Plan SS spoke with Pt. Pt lives with significant other, Vaughn Julien. Pt does not utilize any home health services. Pt utilizes a rolling walker, cane and O2 3L from [employer]. Pt's primary care doctor is Buck Wallis. Pt would like to return home at discharge and significant other will provide transport. SS will continue to follow and assist with discharge planning.    Patient/Family in Agreement with Plan yes              Expected Discharge Date and Time     Expected Discharge Date Expected Discharge Time    Nov 4, 2021          Demographic Summary     Row Name 11/02/21 1651       General Information    Admission Type inpatient    Referral Source nursing  SS received consult for DC planning.                KAYLEIGH Davies

## 2021-11-02 NOTE — ANESTHESIA PREPROCEDURE EVALUATION
Anesthesia Evaluation     Patient summary reviewed and Nursing notes reviewed   no history of anesthetic complications:  NPO Solid Status: > 8 hours  NPO Liquid Status: > 8 hours           Airway   Mallampati: II  TM distance: >3 FB  Dental    (+) poor dentition    Pulmonary    (+) pneumonia worsening , decreased breath sounds,     ROS comment: CT Chest   IMPRESSION:  Continued extensive bilateral parenchymal airspace disease with progressive consolidation of multiple areas of groundglass opacity particularly within the lower lung zones. Commonly reported imaging features of COVID-19 pneumonia are present. Other  processes such as influenza pneumonia and organizing pneumonia can cause a similar imaging pattern.  Cardiovascular - normal exam    ECG reviewed  PT is on anticoagulation therapy    (+) hypertension, dysrhythmias Atrial Fib,     ROS comment: 10/31/21 Echo  Interpretation Summary    · Left ventricular wall thickness is consistent with borderline concentric hypertrophy.  · Estimated left ventricular EF = 60% Left ventricular ejection fraction appears to be 56 - 60%. Left ventricular systolic function is normal.  · Trace MR is present. Aortic valve is unremarkable  · No pericardial effusion  · No prev study         Neuro/Psych- negative ROS  GI/Hepatic/Renal/Endo    (+)   liver disease fatty liver disease, renal disease CRI and ESRD, diabetes mellitus,     Musculoskeletal (-) negative ROS    Abdominal  - normal exam   Substance History - negative use     OB/GYN negative ob/gyn ROS         Other   blood dyscrasia anemia,                     Anesthesia Plan    ASA 3     general     intravenous induction     Anesthetic plan, all risks, benefits, and alternatives have been provided, discussed and informed consent has been obtained with: patient.  Use of blood products discussed with patient  Consented to blood products.       Lab Results   Component Value Date    WBC 7.26 11/02/2021    HGB 9.3 (L) 11/02/2021     HCT 30.6 (L) 11/02/2021    MCV 88.2 11/02/2021     11/02/2021       Lab Results   Component Value Date    GLUCOSE 63 (L) 11/02/2021    BUN 36 (H) 11/02/2021    CREATININE 2.94 (H) 11/02/2021    EGFRIFNONA 16 (L) 11/02/2021    EGFRIFAFRI  11/01/2021      Comment:      <15 Indicative of kidney failure.    BCR 12.2 11/02/2021    K 4.2 11/02/2021    CO2 23.6 11/02/2021    CALCIUM 9.4 11/02/2021    ALBUMIN 2.71 (L) 11/02/2021    LABIL2 1.4 (L) 06/23/2015    AST 9 11/02/2021    ALT 5 11/02/2021

## 2021-11-02 NOTE — PROGRESS NOTES
PROGRESS NOTE         Patient Identification:  Name:  Jessica Bravo  Age:  56 y.o.  Sex:  female  :  1965  MRN:  [id number]  Visit Number:  [id number]  Primary Care Provider:  Buck Wallis APRN         LOS: 3 days       ----------------------------------------------------------------------------------------------------------------------  Subjective       Chief Complaints:    Shortness of Breath and Cough        Interval History:      Patient resting in bed this morning.  Patient has decided to proceed with bronchoscopy today.  Currently on 3 L with no apparent distress.  Afebrile, no diarrhea.  WBC normal.  CRP improving at 5.82.    Review of Systems:    Constitutional: no fever, chills and night sweats. No appetite change or unexpected weight change. No fatigue.  Eyes: no eye drainage, itching or redness.  HEENT: no mouth sores, dysphagia or nose bleed.  Respiratory: Positive shortness of breath, cough. Positive production of sputum.  Cardiovascular: no chest pain, no palpitations, no orthopnea.  Gastrointestinal: no nausea, vomiting or diarrhea. No abdominal pain, hematemesis or rectal bleeding.  Genitourinary: no dysuria or polyuria.  Hematologic/lymphatic: no lymph node abnormalities, no easy bruising or easy bleeding.  Musculoskeletal: no muscle or joint pain.  Skin: No rash and no itching.  Neurological: no loss of consciousness, no seizure, no headache.  Behavioral/Psych: no depression or suicidal ideation.  Endocrine: no hot flashes.  Immunologic: negative.    ----------------------------------------------------------------------------------------------------------------------      Objective       Current [facility] Meds:  amLODIPine, 5 mg, Oral, Q24H  [MAR Hold] apixaban, 5 mg, Oral, Q12H  [MAR Hold] aspirin, 81 mg, Oral, Daily  [MAR Hold] atorvastatin, 40 mg, Oral, Daily  cefepime, 2 g, Intravenous, Q24H  [MAR Hold] cholecalciferol, 50,000 Units, Oral, Weekly  [MAR Hold] fluticasone, 1  spray, Nasal, BID  glycopyrrolate, 0.3 mg, Intravenous, Once  [MAR Hold] hydrOXYzine, 25 mg, Oral, Nightly  [MAR Hold] insulin aspart, 0-14 Units, Subcutaneous, TID AC  [MAR Hold] insulin detemir, 10 Units, Subcutaneous, Nightly  lactated ringers, 125 mL/hr, Intravenous, Once  [MAR Hold] linaclotide, 145 mcg, Oral, QAM AC  [MAR Hold] montelukast, 10 mg, Oral, Nightly  [MAR Hold] pantoprazole, 40 mg, Oral, Daily  [MAR Hold] predniSONE, 40 mg, Oral, Daily With Breakfast  [MAR Hold] sodium bicarbonate, 650 mg, Oral, BID  [MAR Hold] sodium chloride, 10 mL, Intravenous, Q12H  [MAR Hold] sodium chloride, 10 mL, Intravenous, Q12H  sodium chloride, 10 mL, Intravenous, Q12H  Vancomycin Pharmacy Intermittent Dosing, , Does not apply, Daily      IV Fluids 1000 mL + additives, 100 mL/hr, Last Rate: 100 mL/hr (11/02/21 0847)      ----------------------------------------------------------------------------------------------------------------------    Vital Signs:  Temp:  [97.8 °F (36.6 °C)-98.3 °F (36.8 °C)] 98.2 °F (36.8 °C)  Heart Rate:  [] 86  Resp:  [14-20] 20  BP: (133-174)/() 174/89  Mean Arterial Pressure (Non-Invasive) for the past 24 hrs (Last 3 readings):   Noninvasive MAP (mmHg)   11/02/21 1002 115   11/02/21 0920 107   11/02/21 0500 96     SpO2 Percentage    11/02/21 0920 11/02/21 1002 11/02/21 1156   SpO2: 97% 95% 96%     SpO2:  [90 %-97 %] 96 %  on  Flow (L/min):  [3] 3;   Device (Oxygen Therapy): nasal cannula    Body mass index is 27.84 kg/m².  Wt Readings from Last 3 Encounters:   11/02/21 73.6 kg (162 lb 4.8 oz)   10/10/21 72.6 kg (160 lb)   02/05/20 78 kg (172 lb)        Intake/Output Summary (Last 24 hours) at 11/2/2021 1206  Last data filed at 11/2/2021 0831  Gross per 24 hour   Intake 1285.15 ml   Output 650 ml   Net 635.15 ml     NPO Diet  ----------------------------------------------------------------------------------------------------------------------      Physical  Exam:      Constitutional: Chronically ill-appearing.  No respiratory distress.      HENT:  Head: Normocephalic and atraumatic.  Mouth:  Moist mucous membranes.    Eyes:  Conjunctivae and EOM are normal.  No scleral icterus.  Neck:  Neck supple.  No JVD present.    Cardiovascular:  Normal rate, regular rhythm and normal heart sounds with no murmur. No edema.  Pulmonary/Chest: Diminished lung sounds bilaterally.  Abdominal:  Soft.  Bowel sounds are normal.  No distension and no tenderness.   Musculoskeletal:  No edema, no tenderness, and no deformity.  No swelling or redness of joints.  Neurological:  Alert and oriented to person, place, and time.  No facial droop.  No slurred speech.   Skin:  Skin is warm and dry.  No rash noted.  No pallor.   Psychiatric:  Normal mood and affect.  Behavior is normal.       ----------------------------------------------------------------------------------------------------------------------  Results from last 7 days   Lab Units 11/01/21  0124 10/30/21  1513   CK TOTAL U/L 31  --    TROPONIN T ng/mL  --  <0.010     Results from last 7 days   Lab Units 10/30/21  1513   PROBNP pg/mL 714.7       Results from last 7 days   Lab Units 10/30/21  1356   PH, ARTERIAL pH units 7.359   PO2 ART mm Hg 46.6*   PCO2, ARTERIAL mm Hg 37.5   HCO3 ART mmol/L 21.1     Results from last 7 days   Lab Units 11/02/21  0433 11/01/21  0124 10/31/21  0514 10/30/21  1833 10/30/21  1513 10/30/21  1513   CRP mg/dL 5.82* 5.96* 4.02*  --    < > 3.39*   LACTATE mmol/L  --   --  0.8 2.4*  --  2.1*   WBC 10*3/mm3 7.26 8.02 9.03  --    < > 8.48   HEMOGLOBIN g/dL 9.3* 9.7* 9.7*  --    < > 10.4*   HEMATOCRIT % 30.6* 31.7* 31.6*  --    < > 34.6   MCV fL 88.2 90.8 89.5  --    < > 90.3   MCHC g/dL 30.4* 30.6* 30.7*  --    < > 30.1*   PLATELETS 10*3/mm3 274 248 237  --    < > 245   INR   --   --   --   --   --  1.12*    < > = values in this interval not displayed.     Results from last 7 days   Lab Units 11/02/21  0942  11/02/21  0433 11/01/21  0124 10/31/21  0514 10/31/21  0514 10/30/21  1513 10/30/21  1513   SODIUM mmol/L 143 134* 135*   < > 137   < > 135*   POTASSIUM mmol/L 4.2 5.8* 5.2   < > 4.6   < > 5.0   MAGNESIUM mg/dL  --   --   --   --   --   --  2.1   CHLORIDE mmol/L 108* 106 108*   < > 108*   < > 102   CO2 mmol/L 23.6 19.3* 19.7*   < > 20.2*   < > 20.2*   BUN mg/dL 36* 37* 32*   < > 28*   < > 32*   CREATININE mg/dL 2.94* 3.09* 3.42*   < > 3.14*   < > 3.28*   EGFR IF NONAFRICN AM mL/min/1.73 16* 15* 14*   < > 15*   < > 14*   CALCIUM mg/dL 9.4 8.3* 7.9*   < > 7.7*   < > 8.1*   GLUCOSE mg/dL 63* 474* 228*   < > 78   < > 468*   ALBUMIN g/dL  --  2.71* 2.47*  --  2.69*   < > 3.16*   BILIRUBIN mg/dL  --  0.2 0.3  --  0.4   < > 0.3   ALK PHOS U/L  --  88 91  --  92   < > 104   AST (SGOT) U/L  --  9 7  --  7   < > 7   ALT (SGPT) U/L  --  5 6  --  6   < > 9    < > = values in this interval not displayed.   Estimated Creatinine Clearance: 20.5 mL/min (A) (by C-G formula based on SCr of 2.94 mg/dL (H)).  No results found for: AMMONIA    Glucose   Date/Time Value Ref Range Status   11/02/2021 1120 107 70 - 130 mg/dL Final     Comment:     Meter: [id number] : [id number] BRYANT VÁZQUEZ   11/02/2021 0530 422 (C) 70 - 130 mg/dL Final     Comment:     Confirmed by Repeat Meter: [id number] : [id number] Rachel Lopez   11/01/2021 2209 344 (H) 70 - 130 mg/dL Final     Comment:     Meter: [id number] : [id number] Rachel John   11/01/2021 1812 411 (C) 70 - 130 mg/dL Final     Comment:     Treated Patient Meter: [id number] : [id number] VINH TAN   11/01/2021 1206 182 (H) 70 - 130 mg/dL Final     Comment:     Meter: [id number] : [id number] VINH TAN   11/01/2021 0622 161 (H) 70 - 130 mg/dL Final     Comment:     Meter: [id number] : [id number] Rachel John   10/31/2021 2239 243 (H) 70 - 130 mg/dL Final     Comment:     Meter: [id number] : [id number] Rachel Lopez   10/31/2021 1645 141 (H) 70 - 130 mg/dL Final      Comment:     Meter: [id number] : [id number] Tami Prince     Lab Results   Component Value Date    HGBA1C 10.50 (H) 10/09/2021     Lab Results   Component Value Date    TSH 0.397 10/09/2021       Blood Culture   Date Value Ref Range Status   10/30/2021 No growth at 24 hours  Preliminary   10/30/2021 No growth at 24 hours  Preliminary     No results found for: URINECX  No results found for: WOUNDCX  No results found for: STOOLCX  No results found for: RESPCX  Pain Management Panel       Pain Management Panel Latest Ref Rng & Units 10/10/2021    CREATININE UR mg/dL 41.4              ----------------------------------------------------------------------------------------------------------------------  Imaging Results (Last 24 Hours)       ** No results found for the last 24 hours. **            ----------------------------------------------------------------------------------------------------------------------    Assessment/Plan       Assessment/Plan     ASSESSMENT:    1.  Severe sepsis with lactic acid greater than 2 on admission  2.  Bilateral pneumonia    PLAN:    Patient resting in bed this morning.  Patient has decided to proceed with bronchoscopy today.  Currently on 3 L with no apparent distress.  Afebrile, no diarrhea.  WBC normal.  CRP improving at 5.82.    Patient was recently hospitalized at our facility from 10/9/2021 through 10/15/2021 for sepsis secondary to COVID-19 pneumonia and was treated with a regimen of Levaquin, Flagyl, micafungin after concern for superimposed bacterial pneumonia and underlying UTI.  Levaquin and Flagyl p.o. course were to be finished on 10/20/2021.    Respiratory panel PCR negative.  MRSA PCR negative.  Legionella negative.  VQ scan from 10/30/2021 reports low probability of PE.  Chest x-ray from 10/30/2021 reports significant interval worsening of bilateral pneumonia.  CT of the chest from 10/30/2021 reports continued extensive bilateral parenchymal airspace  disease with progressive consolidation in multiple areas of groundglass opacity particularly in the lower lung zones.     For now recommend to continue vancomycin pharmacy to dose and cefepime pharmacy to dose empirically.  We will continue to follow closely and adjust antibiotic therapy as needed.      Code Status:   Code Status and Medical Interventions:   Ordered at: 10/30/21 1632     Code Status (Patient has no pulse and is not breathing):    CPR (Attempt to Resuscitate)     Medical Interventions (Patient has pulse or is breathing):    Full       WISAM Robin  11/02/21  12:06 EDT

## 2021-11-02 NOTE — ANESTHESIA POSTPROCEDURE EVALUATION
Patient: Jessica Bravo    Procedure Summary     Date: 11/02/21 Room / Location:  [facility] 09 /  [facility]    Anesthesia Start: 1230 Anesthesia Stop: 1246    Procedure: BRONCHOSCOPY (N/A Bronchus) Diagnosis:       Pneumonia of both lower lobes due to infectious organism      (Pneumonia of both lower lobes due to infectious organism [J18.9])    Surgeons: Brodie Dobbins MD Provider: Charles Begum MD    Anesthesia Type: general ASA Status: 3          Anesthesia Type: general    Vitals  No vitals data found for the desired time range.          Post Anesthesia Care and Evaluation    Patient location during evaluation: PACU  Patient participation: complete - patient participated  Level of consciousness: awake  Pain score: 0  Pain management: adequate  Airway patency: patent  Anesthetic complications: No anesthetic complications  PONV Status: none  Cardiovascular status: acceptable  Respiratory status: acceptable  Hydration status: acceptable

## 2021-11-02 NOTE — PROGRESS NOTES
[facility]     Mar/chart reviewed  Follow up persistently abnormal ct chest  Still with shortness of breath, worse on exertion  She does feel better overall    Vital Sign Min/Max for last 24 hours  Temp  Min: 97.8 °F (36.6 °C)  Max: 98.3 °F (36.8 °C)   BP  Min: 133/57  Max: 159/123   Pulse  Min: 81  Max: 103   No data recorded   SpO2  Min: 90 %  Max: 97 %   Flow (L/min)  Min: 3  Max: 3   Weight  Min: 73.6 kg (162 lb 4.8 oz)  Max: 73.6 kg (162 lb 4.8 oz)     Appears ill, axox3,   perrl, eomi, no icterus,  mmm, no jvd, trachea midline, neck supple,  chest decreased ae bilaterally, no crackles, no wheezes,   rrr,   soft, nt, nd +bs,  no c/c/ e  Skin warm, dry  No rashes    Labs: 11/2: reviewed:  Wbc 7  hgb 9.3  plts 274  Glucose 474  Bun 37  Cr 3.09  Na 134  k 5.8  Bicarb 19.3    c3 and c4 normal  crp modestly elevated, esr elevated    A/P:  1. Acute hypoxemic respiratory failure -- suspect she will remain oxygen dependent for sometime, possible indefinately.  Unclear how much of her current ct finding represent permanent post COVID fibrosis vs. Reversible inflammation or other process  2. Persistent pneumonia -- explained DDX includes infectious causes, organizing pneumonia, Autoimmune processes, and/or post covid fibrosis/inflammation.  Explained best way to rule out ongoing infection would be bronchoscopy.  Explained bronchoscopy procedure with sedation including low risks of anestesia, bleeding and lung puncture.  serology orderd and started on emperic steroids.  3. CKD  4. Anemia

## 2021-11-02 NOTE — PLAN OF CARE
Goal Outcome Evaluation:           Progress: improving  Outcome Summary: pt a/o. vss. bronch today with bal of rml. c/o cough and back pain .

## 2021-11-02 NOTE — PLAN OF CARE
Goal Outcome Evaluation:           Progress: improving  Outcome Summary: VSS. Pt remains on 3L NC. Pt seems to be more cooperative this shift. Pt NPO since midnight. Pt has no complaints at this time. Will continue to monitor.

## 2021-11-02 NOTE — OP NOTE
Bronchoscopy Procedure Note    Procedure:  1. Bronchoscopy, Diagnostic    Pre-Operative Diagnosis: pneumonia    Post-Operative Diagnosis: Same    Indication: pneumonia, persistent, bilateral    Anesthesia: Monitored Anesthesia Care (MAC)    Procedure Details: Patient was consented for the procedure with all risks and benefits of the procedure explained in detail.  Patient was given the opportunity to ask questions and all concerns were answered.  The bronchocope was inserted into the main airway via the oropharynx. An anatomical survey was done of the main airways and the subsegmental bronchus.  The findings are reported above.  A bronchoalveolar lavage was performed using aliquots of normal saline instilled into the airways then aspirated back.  BAL peformed in the Right middle lobe.     Findings: airways inflammed and collapsible    Estimated Blood Loss:  Minimal           Specimens:  Sent serosanguinous fluid                Complications:  None; patient tolerated the procedure well.           Disposition: PACU - hemodynamically stable.      Patient tolerated the procedure well.    While I was in the room and during my examination of the patient I wore gown, gloves, mask, eye protection and washed my hands before and after the encounter.  Proper enhanced droplet precautions and isolation precautions were taken.    Brodie Dobbins MD  11/2/2021  13:16 EDT

## 2021-11-02 NOTE — PROGRESS NOTES
Airways inflammed, collapsable, not much mucous at all    Lidocaine done below cords only, so diet can be resumed

## 2021-11-02 NOTE — PROGRESS NOTES
Nephrology Progress Note      Subjective     Patient feels better today, no chest pain or shortness of breath    Objective       Vital signs :     Temp:  [97.8 °F (36.6 °C)-98.3 °F (36.8 °C)] 98.3 °F (36.8 °C)  Heart Rate:  [] 109  Resp:  [14-20] 20  BP: ()/() 105/65      Intake/Output Summary (Last 24 hours) at 11/2/2021 1347  Last data filed at 11/2/2021 1242  Gross per 24 hour   Intake 1310.15 ml   Output 650 ml   Net 660.15 ml       Physical Exam:    General Appearance : not in acute distress  Lungs : clear to auscultation, respirations regular  Heart :  regular rhythm & normal rate, normal S1, S2 and no murmur, no rub  Abdomen : normal bowel sounds, no masses, no hepatomegaly, no splenomegaly, soft non-tender and no guarding  Extremities : moves extremities well, no edema, no cyanosis and no redness  Neurologic :   orientated to person, place, time and situation, Grossly no focal deficits      Laboratory Data :     Albumin Albumin   Date Value Ref Range Status   11/02/2021 2.71 (L) 3.50 - 5.20 g/dL Final   11/01/2021 2.47 (L) 3.50 - 5.20 g/dL Final   10/31/2021 2.69 (L) 3.50 - 5.20 g/dL Final   10/30/2021 3.16 (L) 3.50 - 5.20 g/dL Final      Magnesium Magnesium   Date Value Ref Range Status   10/30/2021 2.1 1.6 - 2.6 mg/dL Final          PTH               No results found for: PTH    CBC and coagulation:  Results from last 7 days   Lab Units 11/02/21  0433 11/01/21  0124 10/31/21  0514 10/30/21  1833 10/30/21  1513 10/30/21  1513   LACTATE mmol/L  --   --  0.8 2.4*  --  2.1*   SED RATE mm/hr 65*  --   --   --   --   --    CRP mg/dL 5.82* 5.96* 4.02*  --    < > 3.39*   WBC 10*3/mm3 7.26 8.02 9.03  --    < > 8.48   HEMOGLOBIN g/dL 9.3* 9.7* 9.7*  --    < > 10.4*   HEMATOCRIT % 30.6* 31.7* 31.6*  --    < > 34.6   MCV fL 88.2 90.8 89.5  --    < > 90.3   MCHC g/dL 30.4* 30.6* 30.7*  --    < > 30.1*   PLATELETS 10*3/mm3 274 248 237  --    < > 245   INR   --   --   --   --   --  1.12*   D DIMER QUANT  MCGFEU/mL  --   --   --   --   --  0.90*    < > = values in this interval not displayed.     Acid/base balance:  Results from last 7 days   Lab Units 10/30/21  1356   PH, ARTERIAL pH units 7.359   PO2 ART mm Hg 46.6*   PCO2, ARTERIAL mm Hg 37.5   HCO3 ART mmol/L 21.1     Renal and electrolytes:  Results from last 7 days   Lab Units 11/02/21  0942 11/02/21 0433 11/01/21  0124 10/31/21  0514 10/31/21  0514 10/30/21  1513 10/30/21  1513   SODIUM mmol/L 143 134* 135*  --  137  --  135*   POTASSIUM mmol/L 4.2 5.8* 5.2   < > 4.6   < > 5.0   MAGNESIUM mg/dL  --   --   --   --   --   --  2.1   CHLORIDE mmol/L 108* 106 108*   < > 108*   < > 102   CO2 mmol/L 23.6 19.3* 19.7*   < > 20.2*   < > 20.2*   BUN mg/dL 36* 37* 32*   < > 28*   < > 32*   CREATININE mg/dL 2.94* 3.09* 3.42*  --  3.14*  --  3.28*   EGFR IF NONAFRICN AM mL/min/1.73 16* 15* 14*   < > 15*   < > 14*   CALCIUM mg/dL 9.4 8.3* 7.9*   < > 7.7*   < > 8.1*    < > = values in this interval not displayed.     Estimated Creatinine Clearance: 20.5 mL/min (A) (by C-G formula based on SCr of 2.94 mg/dL (H)).    Liver and pancreatic function:  Results from last 7 days   Lab Units 11/02/21  0433 11/01/21  0124 10/31/21  0514   ALBUMIN g/dL 2.71* 2.47* 2.69*   BILIRUBIN mg/dL 0.2 0.3 0.4   ALK PHOS U/L 88 91 92   AST (SGOT) U/L 9 7 7   ALT (SGPT) U/L 5 6 6         Cardiac:  Results from last 7 days   Lab Units 10/30/21  1513   PROBNP pg/mL 714.7     Liver and pancreatic function:  Results from last 7 days   Lab Units 11/02/21  0433 11/01/21  0124 10/31/21  0514   ALBUMIN g/dL 2.71* 2.47* 2.69*   BILIRUBIN mg/dL 0.2 0.3 0.4   ALK PHOS U/L 88 91 92   AST (SGOT) U/L 9 7 7   ALT (SGPT) U/L 5 6 6       Medications :     amLODIPine, 5 mg, Oral, Q24H  [MAR Hold] apixaban, 5 mg, Oral, Q12H  [MAR Hold] aspirin, 81 mg, Oral, Daily  [MAR Hold] atorvastatin, 40 mg, Oral, Daily  cefepime, 2 g, Intravenous, Q24H  [MAR Hold] cholecalciferol, 50,000 Units, Oral, Weekly  [MAR Hold]  fluticasone, 1 spray, Nasal, BID  [MAR Hold] hydrOXYzine, 25 mg, Oral, Nightly  [MAR Hold] insulin aspart, 0-14 Units, Subcutaneous, TID AC  [MAR Hold] insulin detemir, 10 Units, Subcutaneous, Nightly  [MAR Hold] linaclotide, 145 mcg, Oral, QAM AC  [MAR Hold] montelukast, 10 mg, Oral, Nightly  [MAR Hold] pantoprazole, 40 mg, Oral, Daily  [MAR Hold] predniSONE, 40 mg, Oral, Daily With Breakfast  [MAR Hold] sodium bicarbonate, 650 mg, Oral, BID  [MAR Hold] sodium chloride, 10 mL, Intravenous, Q12H  [MAR Hold] sodium chloride, 10 mL, Intravenous, Q12H  Vancomycin Pharmacy Intermittent Dosing, , Does not apply, Daily      IV Fluids 1000 mL + additives, 100 mL/hr, Last Rate: 100 mL/hr (11/02/21 0847)          Assessment/Plan     1-Acute kidney injury on chronic kidney disease stage IV:   2-Hyponatremia, improving.  3-acute  Hypoxic respiratory failure due to pneumonia  4.Essential hypertension  5-metabolic acidosis  6-DM  7. Nephrotic range proteinuria likely due to DKD    Relatively better, continue on IVF for now. Metabolic acidosis has resolved      ANA PAULA on CKD stage IV likely pre renal azotemia   Follows Dr. Araiza and was on dialysis for 3 times   4G proteinuria, no significant hematuria previously  During last hospitalization, Cr improved to 2.3, now admitted with 3.2.   -1/2NS with sodium bicarb 75meq/L @ 100mlh      Eusebio Duarte MD  11/02/21  13:47 EDT

## 2021-11-02 NOTE — PROGRESS NOTES
[facility] HOSPITALIST PROGRESS NOTE     Patient Identification:  Name:  Jessica Bravo  Age:  56 y.o.  Sex:  female  :  1965  MRN:  [id number]  Visit Number:  [id number]  ROOM: [address]     Primary Care Provider:  Buck Wallis APRN    Length of stay in inpatient status:  3    Subjective     Chief Compliant:    Chief Complaint   Patient presents with   • Shortness of Breath   • Cough       History of Presenting Illness:    Patient seen in follow-up for acute hypoxic respiratory failure. Still complains of persistent dyspnea, with a dry nonproductive cough. Says she feels a little bit better today. Renal function about the same.  Hemodynamically stable, 93% on 3 L.  No adverse events noted by nursing.    Objective     Current Hospital Meds:amLODIPine, 5 mg, Oral, Q24H  apixaban, 5 mg, Oral, Q12H  aspirin, 81 mg, Oral, Daily  atorvastatin, 40 mg, Oral, Daily  cefepime, 2 g, Intravenous, Q24H  cholecalciferol, 50,000 Units, Oral, Weekly  fluticasone, 1 spray, Nasal, BID  hydrOXYzine, 25 mg, Oral, Nightly  insulin aspart, 0-14 Units, Subcutaneous, TID AC  insulin detemir, 10 Units, Subcutaneous, Nightly  linaclotide, 145 mcg, Oral, QAM AC  montelukast, 10 mg, Oral, Nightly  pantoprazole, 40 mg, Oral, Daily  predniSONE, 40 mg, Oral, Daily With Breakfast  sodium bicarbonate, 650 mg, Oral, BID  sodium chloride, 10 mL, Intravenous, Q12H  sodium chloride, 10 mL, Intravenous, Q12H  Vancomycin Pharmacy Intermittent Dosing, , Does not apply, Daily    IV Fluids 1000 mL + additives, 100 mL/hr, Last Rate: 100 mL/hr (21 0847)        Current Antimicrobial Therapy:  Anti-Infectives (From admission, onward)    Ordered     Dose/Rate Route Frequency Start Stop    10/30/21 1807  cefepime 2 gm IVPB in 100 ml NS (VTB)        Ordering Provider: Stephan Bolton MD    2 g  over 4 Hours Intravenous Every 24 Hours 10/31/21 1700 21 1659    10/31/21 1417  vancomycin 1 g/250 mL 0.9% NS (vial-mate)        Ordering  Provider: Stephan Bolton MD    1,000 mg  over 60 Minutes Intravenous Once 10/31/21 1600 10/31/21 1654    10/30/21 1813  vancomycin 1500 mg/500 mL 0.9% NS IVPB ([facility])        Ordering Provider: Stephan Bolton MD    20 mg/kg × 75.2 kg  over 90 Minutes Intravenous Once 10/30/21 1900 10/30/21 2107    10/30/21 1813  Vancomycin Pharmacy Intermittent Dosing        Ordering Provider: Stephan Bolton MD     Does not apply Daily 10/30/21 1900 11/06/21 0859    10/30/21 1627  metroNIDAZOLE (FLAGYL) 500 mg/100mL IVPB        Ordering Provider: Michael Puente MD    500 mg  over 30 Minutes Intravenous Once 10/30/21 1629 10/30/21 1758        Current Diuretic Therapy:  Diuretics (From admission, onward)    None        ----------------------------------------------------------------------------------------------------------------------  Vital Signs:  Temp:  [97.8 °F (36.6 °C)-98.3 °F (36.8 °C)] 98.3 °F (36.8 °C)  Heart Rate:  [] 87  Resp:  [14-20] 20  BP: (133-159)/() 149/68  SpO2:  [90 %-97 %] 95 %  on  Flow (L/min):  [3] 3;   Device (Oxygen Therapy): nasal cannula  Body mass index is 27.84 kg/m².    Wt Readings from Last 3 Encounters:   11/02/21 73.6 kg (162 lb 4.8 oz)   10/10/21 72.6 kg (160 lb)   02/05/20 78 kg (172 lb)     Intake & Output (last 3 days)       10/30 0701  10/31 0700 10/31 0701  11/01 0700 11/01 0701  11/02 0700 11/02 0701 11/03 0700    P.O.  580 540 0    I.V. (mL/kg)  250.2 (3.3) 1285.2 (17.5)     IV Piggyback 1100       Total Intake(mL/kg) 1100 (14.6) 830.2 (11.1) 1825.2 (24.8) 0 (0)    Urine (mL/kg/hr)  1350 (0.8) 1050 (0.6)     Total Output  1350 1050     Net +1100 -519.8 +775.2 0                NPO Diet  ----------------------------------------------------------------------------------------------------------------------  Physical exam:  Constitutional: older female., appears to feel unwell, no acute resp distress  HENT:  Head:  Normocephalic and atraumatic.  Mouth:  Moist mucous membranes.    Eyes:   Conjunctivae and EOM are normal. No scleral icterus.   Cardiovascular:  Normal rate, regular rhythm and normal heart sounds with no murmur. No JVD.   Pulmonary/Chest:  No respiratory distress, no wheezes, no crackles, with normal breath sounds. Diminished b/l. Unlabored. No accessory muscle use.  Abdominal:  Soft. No distension and no tenderness.  Bowel sounds present. No rebound or guarding.   Musculoskeletal:  No tenderness, and no deformity.  No red or swollen joints anywhere.    Neurological:  Alert and oriented to person, place, and time.  No cranial nerve deficit.   Nonfocal.   Skin:  Skin is warm and dry. No rash noted. No pallor.   Peripheral vascular:  No clubbing, no cyanosis, no edema. Pedal and tibial pulses 2/4 bilaterally.   ----------------------------------------------------------------------------------------------------------------------  Results from last 7 days   Lab Units 11/02/21  0433 11/01/21  0124 10/31/21  0514 10/30/21  1833 10/30/21  1513 10/30/21  1513   CRP mg/dL 5.82* 5.96* 4.02*  --    < > 3.39*   LACTATE mmol/L  --   --  0.8 2.4*  --  2.1*   WBC 10*3/mm3 7.26 8.02 9.03  --    < > 8.48   HEMOGLOBIN g/dL 9.3* 9.7* 9.7*  --    < > 10.4*   HEMATOCRIT % 30.6* 31.7* 31.6*  --    < > 34.6   MCV fL 88.2 90.8 89.5  --    < > 90.3   MCHC g/dL 30.4* 30.6* 30.7*  --    < > 30.1*   PLATELETS 10*3/mm3 274 248 237  --    < > 245   INR   --   --   --   --   --  1.12*    < > = values in this interval not displayed.     Results from last 7 days   Lab Units 10/30/21  1356   PH, ARTERIAL pH units 7.359   PO2 ART mm Hg 46.6*   PCO2, ARTERIAL mm Hg 37.5   HCO3 ART mmol/L 21.1     Results from last 7 days   Lab Units 11/02/21  0942 11/02/21  0433 11/01/21  0124 10/31/21  0514 10/31/21  0514 10/30/21  1513 10/30/21  1513   SODIUM mmol/L 143 134* 135*   < > 137   < > 135*   POTASSIUM mmol/L 4.2 5.8* 5.2   < > 4.6   < > 5.0   MAGNESIUM mg/dL  --   --   --   --   --   --  2.1   CHLORIDE mmol/L 108* 106 108*    < > 108*   < > 102   CO2 mmol/L 23.6 19.3* 19.7*   < > 20.2*   < > 20.2*   BUN mg/dL 36* 37* 32*   < > 28*   < > 32*   CREATININE mg/dL 2.94* 3.09* 3.42*   < > 3.14*   < > 3.28*   EGFR IF NONAFRICN AM mL/min/1.73 16* 15* 14*   < > 15*   < > 14*   CALCIUM mg/dL 9.4 8.3* 7.9*   < > 7.7*   < > 8.1*   GLUCOSE mg/dL 63* 474* 228*   < > 78   < > 468*   ALBUMIN g/dL  --  2.71* 2.47*  --  2.69*   < > 3.16*   BILIRUBIN mg/dL  --  0.2 0.3  --  0.4   < > 0.3   ALK PHOS U/L  --  88 91  --  92   < > 104   AST (SGOT) U/L  --  9 7  --  7   < > 7   ALT (SGPT) U/L  --  5 6  --  6   < > 9    < > = values in this interval not displayed.   Estimated Creatinine Clearance: 20.5 mL/min (A) (by C-G formula based on SCr of 2.94 mg/dL (H)).  No results found for: AMMONIA  Results from last 7 days   Lab Units 11/01/21  0124 10/30/21  1513   CK TOTAL U/L 31  --    TROPONIN T ng/mL  --  <0.010     Results from last 7 days   Lab Units 10/30/21  1513   PROBNP pg/mL 714.7         Glucose   Date/Time Value Ref Range Status   11/02/2021 1120 107 70 - 130 mg/dL Final     Comment:     Meter: [id number] : [id number] BRYANT BETTENCOURTA   11/02/2021 0530 422 (C) 70 - 130 mg/dL Final     Comment:     Confirmed by Repeat Meter: [id number] : [id number] Rachelpavan Lopez   11/01/2021 2209 344 (H) 70 - 130 mg/dL Final     Comment:     Meter: [id number] : [id number] Rachelpavan Lopez   11/01/2021 1812 411 (C) 70 - 130 mg/dL Final     Comment:     Treated Patient Meter: [id number] : [id number] VINH BRITNEY   11/01/2021 1206 182 (H) 70 - 130 mg/dL Final     Comment:     Meter: [id number] : [id number] VINH BRITNEY   11/01/2021 0622 161 (H) 70 - 130 mg/dL Final     Comment:     Meter: [id number] : [id number] Rachel John   10/31/2021 2239 243 (H) 70 - 130 mg/dL Final     Comment:     Meter: [id number] : [id number] [facility]   10/31/2021 1645 141 (H) 70 - 130 mg/dL Final     Comment:     Meter: [id number] : [id number] Tami Prince      Lab Results   Component Value Date    TSH 0.397 10/09/2021     No results found for: PREGTESTUR, PREGSERUM, HCG, HCGQUANT  Pain Management Panel     Pain Management Panel Latest Ref Rng & Units 10/10/2021    CREATININE UR mg/dL 41.4        Brief Urine Lab Results  (Last result in the past 365 days)      Color   Clarity   Blood   Leuk Est   Nitrite   Protein   CREAT   Urine HCG        10/10/21 1527             41.4             Blood Culture   Date Value Ref Range Status   10/30/2021 No growth at 24 hours  Preliminary   10/30/2021 No growth at 24 hours  Preliminary     No results found for: URINECX  No results found for: WOUNDCX  No results found for: STOOLCX  No results found for: RESPCX  No results found for: AFBCX  Results from last 7 days   Lab Units 11/02/21  0433 11/01/21  0124 10/31/21  0514 10/30/21  1833 10/30/21  1513   LACTATE mmol/L  --   --  0.8 2.4* 2.1*   SED RATE mm/hr 65*  --   --   --   --    CRP mg/dL 5.82* 5.96* 4.02*  --  3.39*       I have personally looked at the labs and they are summarized above.  ----------------------------------------------------------------------------------------------------------------------  Detailed radiology reports for the last 24 hours:  Imaging Results (Last 24 Hours)     ** No results found for the last 24 hours. **        Assessment & Plan      Patient is a 56-year-old female with history significant for CKD stage IV presented with worsening shortness of breath and CAP.    #Sepsis secondary to CAP vs   #Acute hypoxemic respiratory failure due to CAP vs   #Recent COVID-19 dx  --Patient remains hemodynamically stable.  93% on 3 L nasal cannula.  Does not wear home O2.  Recently hospitalized at this facility in [facility] with Covid.  --Admission labs notable for CRP 3.4, WBC 8K, ABG 7.35/37/46 on room air, BUN/creatinine 32/3.2. ESR 65.   --CT chest without noted extensive bilateral groundglass opacities with progressive consolidation in multiple  areas  --Echocardiogram with preserved ejection fraction, no diastolic dysfunction  --Legionella, respiratory panel, strep pneumo, MRSA nasal swab negative  --follow up anca and complement levels, anti GBM, jewel profile, anti PLAR2  --Given her persistent symptoms and characteristics of chest CT, concern for cryptogenic organizing pneumonia. I think would be unlikely this is persistent COVID-19 disease given her low O2 requirements and CT characteristics. She has been afebrile, no leukocytosis and minimally elevated inflammatory markers therefore infectious process I feel like is less likely  --continue po prednisone for now  --Continue IV cefepime, vancomycin per ID recommendations  --Pulmonology planning for bronch today, appreciate recommendations     #ANA PAULA on CKD IV  #Metabolic acidosis  #Hyperkalemia  #Solitary kidney  --baseline renal function Cr 2.3.  Had previously dialyzed 3x week. Previously referred to [facility] for 2nd opinion regarding transplant evaluation after not listed by [facility] with concern for medical noncompliance  & poor social support. reportedly neg AI workup when preformed yrs ago when renal disease initially presented.  --Right kidney severely atrophic due to diabetic nephropathy  --UA with 4 g proteinuria, hematuria. CK wnl.   --BUN/creatinine 37/3.0, potassium 5.8, bicarb 20, GFR 15  --Renal ultrasound of left kidney unremarkable  --c/w sodium bicarb, target serum bicarb 22  --concern for AI process, ANCA and work-up noted per above- given new pulmonary involvement, I believe its reasonable to repeat the workup  --Renally dose medications to GFR less than 15, avoid nephrotoxins  --Nephrology following, appreciate recs     #Hyperglycemia with type 2 DM  --A1c 10.9%. am glucose 474, received IV insulin 10u with sharp decrease-very sensitive. Basal had been decreased prior d/t wide swings of hyper/hypoglycemia. continue low dose basal and SSI achs w/ accuchecks      #Elevated d-dimer  --d-dimer  0.90. VQ scan low probability of PE.  --CT chest PE protocol not obtained given ANA PAULA on CKD IV.   --US venous doppler neg for DVT  --continue home Eliquis    #HLD, continue ASA 81, statin  #HTN, norvasc    #Medical Non-Adherence  --Given patient's reported choices and actions to not adhere to medical treatments and recommendations, this will drastically affect their short and long term morbidity and mortality.      CHECKLIST:  Abx: vancomycin, cefepime  Steroids: po pred  VTE: eliquis  GI ppx: ppi  Diet: diabetic, renal  Code: CPR, full  Dispo: Patient is high risk due to continued hypoxia requiring supplemental oxygen with concerns for bacterial pneumonia versus cryptogenic organizing pneumonia, plan for bronchoscopy today anticipate greater than 2 midnight stay.  Disposition expected Home when medically stable.    Roland Alberto DO  [facility] Hospitalist  11/02/21  11:40 EDT

## 2021-11-03 ENCOUNTER — APPOINTMENT (OUTPATIENT)
Dept: GENERAL RADIOLOGY | Facility: HOSPITAL | Age: 56
End: 2021-11-03

## 2021-11-03 LAB
ALBUMIN SERPL-MCNC: 2.78 G/DL (ref 3.5–5.2)
ALBUMIN/GLOB SERPL: 0.8 G/DL
ALP SERPL-CCNC: 94 U/L (ref 39–117)
ALT SERPL W P-5'-P-CCNC: 5 U/L (ref 1–33)
ANION GAP SERPL CALCULATED.3IONS-SCNC: 10.1 MMOL/L (ref 5–15)
AST SERPL-CCNC: 9 U/L (ref 1–32)
BILIRUB SERPL-MCNC: 0.3 MG/DL (ref 0–1.2)
BUN SERPL-MCNC: 35 MG/DL (ref 6–20)
BUN/CREAT SERPL: 11.7 (ref 7–25)
CALCIUM SPEC-SCNC: 7.6 MG/DL (ref 8.6–10.5)
CCP IGA+IGG SERPL IA-ACNC: 7 UNITS (ref 0–19)
CHLORIDE SERPL-SCNC: 101 MMOL/L (ref 98–107)
CO2 SERPL-SCNC: 22.9 MMOL/L (ref 22–29)
CREAT SERPL-MCNC: 2.99 MG/DL (ref 0.57–1)
CRP SERPL-MCNC: 3.44 MG/DL (ref 0–0.5)
DEPRECATED RDW RBC AUTO: 51.8 FL (ref 37–54)
ERYTHROCYTE [DISTWIDTH] IN BLOOD BY AUTOMATED COUNT: 15.9 % (ref 12.3–15.4)
GALACTOMANNAN AG SPEC IA-ACNC: 0.05 INDEX (ref 0–0.49)
GFR SERPL CREATININE-BSD FRML MDRD: 16 ML/MIN/1.73
GLOBULIN UR ELPH-MCNC: 3.3 GM/DL
GLUCOSE BLDC GLUCOMTR-MCNC: 329 MG/DL (ref 70–130)
GLUCOSE BLDC GLUCOMTR-MCNC: 375 MG/DL (ref 70–130)
GLUCOSE BLDC GLUCOMTR-MCNC: 410 MG/DL (ref 70–130)
GLUCOSE BLDC GLUCOMTR-MCNC: 464 MG/DL (ref 70–130)
GLUCOSE BLDC GLUCOMTR-MCNC: 523 MG/DL (ref 70–130)
GLUCOSE BLDC GLUCOMTR-MCNC: 544 MG/DL (ref 70–130)
GLUCOSE SERPL-MCNC: 386 MG/DL (ref 65–99)
HCT VFR BLD AUTO: 30.1 % (ref 34–46.6)
HGB BLD-MCNC: 9.1 G/DL (ref 12–15.9)
MCH RBC QN AUTO: 27 PG (ref 26.6–33)
MCHC RBC AUTO-ENTMCNC: 30.2 G/DL (ref 31.5–35.7)
MCV RBC AUTO: 89.3 FL (ref 79–97)
PLATELET # BLD AUTO: 278 10*3/MM3 (ref 140–450)
PMV BLD AUTO: 9.4 FL (ref 6–12)
POTASSIUM SERPL-SCNC: 4.9 MMOL/L (ref 3.5–5.2)
PROT SERPL-MCNC: 6.1 G/DL (ref 6–8.5)
RBC # BLD AUTO: 3.37 10*6/MM3 (ref 3.77–5.28)
SODIUM SERPL-SCNC: 134 MMOL/L (ref 136–145)
WBC # BLD AUTO: 7.01 10*3/MM3 (ref 3.4–10.8)

## 2021-11-03 PROCEDURE — 82962 GLUCOSE BLOOD TEST: CPT

## 2021-11-03 PROCEDURE — 85027 COMPLETE CBC AUTOMATED: CPT | Performed by: INTERNAL MEDICINE

## 2021-11-03 PROCEDURE — 99233 SBSQ HOSP IP/OBS HIGH 50: CPT | Performed by: INTERNAL MEDICINE

## 2021-11-03 PROCEDURE — 71045 X-RAY EXAM CHEST 1 VIEW: CPT

## 2021-11-03 PROCEDURE — 25010000002 CEFEPIME PER 500 MG: Performed by: STUDENT IN AN ORGANIZED HEALTH CARE EDUCATION/TRAINING PROGRAM

## 2021-11-03 PROCEDURE — 99233 SBSQ HOSP IP/OBS HIGH 50: CPT | Performed by: STUDENT IN AN ORGANIZED HEALTH CARE EDUCATION/TRAINING PROGRAM

## 2021-11-03 PROCEDURE — 63710000001 PREDNISONE PER 1 MG: Performed by: INTERNAL MEDICINE

## 2021-11-03 PROCEDURE — 71045 X-RAY EXAM CHEST 1 VIEW: CPT | Performed by: RADIOLOGY

## 2021-11-03 PROCEDURE — 80053 COMPREHEN METABOLIC PANEL: CPT | Performed by: INTERNAL MEDICINE

## 2021-11-03 PROCEDURE — 25010000002 METHYLPREDNISOLONE PER 125 MG: Performed by: INTERNAL MEDICINE

## 2021-11-03 PROCEDURE — 86140 C-REACTIVE PROTEIN: CPT | Performed by: INTERNAL MEDICINE

## 2021-11-03 PROCEDURE — 63710000001 INSULIN REGULAR HUMAN PER 5 UNITS: Performed by: STUDENT IN AN ORGANIZED HEALTH CARE EDUCATION/TRAINING PROGRAM

## 2021-11-03 PROCEDURE — 63710000001 INSULIN DETEMIR PER 5 UNITS: Performed by: STUDENT IN AN ORGANIZED HEALTH CARE EDUCATION/TRAINING PROGRAM

## 2021-11-03 PROCEDURE — 63710000001 INSULIN ASPART PER 5 UNITS: Performed by: STUDENT IN AN ORGANIZED HEALTH CARE EDUCATION/TRAINING PROGRAM

## 2021-11-03 PROCEDURE — 63710000001 INSULIN ASPART PER 5 UNITS: Performed by: INTERNAL MEDICINE

## 2021-11-03 PROCEDURE — 63710000001 INSULIN ASPART PER 5 UNITS: Performed by: PHYSICIAN ASSISTANT

## 2021-11-03 RX ORDER — SODIUM CHLORIDE 0.9 % (FLUSH) 0.9 %
10 SYRINGE (ML) INJECTION AS NEEDED
Status: DISCONTINUED | OUTPATIENT
Start: 2021-11-03 | End: 2021-11-06 | Stop reason: HOSPADM

## 2021-11-03 RX ORDER — SODIUM CHLORIDE 9 MG/ML
75 INJECTION, SOLUTION INTRAVENOUS CONTINUOUS
Status: DISCONTINUED | OUTPATIENT
Start: 2021-11-03 | End: 2021-11-04

## 2021-11-03 RX ORDER — SODIUM CHLORIDE 0.9 % (FLUSH) 0.9 %
10 SYRINGE (ML) INJECTION EVERY 12 HOURS SCHEDULED
Status: DISCONTINUED | OUTPATIENT
Start: 2021-11-03 | End: 2021-11-06 | Stop reason: HOSPADM

## 2021-11-03 RX ADMIN — FLUTICASONE PROPIONATE 1 SPRAY: 50 SPRAY, METERED NASAL at 08:50

## 2021-11-03 RX ADMIN — SODIUM CHLORIDE 75 ML/HR: 9 INJECTION, SOLUTION INTRAVENOUS at 16:38

## 2021-11-03 RX ADMIN — INSULIN ASPART 14 UNITS: 100 INJECTION, SOLUTION INTRAVENOUS; SUBCUTANEOUS at 22:35

## 2021-11-03 RX ADMIN — APIXABAN 5 MG: 5 TABLET, FILM COATED ORAL at 20:34

## 2021-11-03 RX ADMIN — SODIUM CHLORIDE, PRESERVATIVE FREE 10 ML: 5 INJECTION INTRAVENOUS at 08:51

## 2021-11-03 RX ADMIN — CEFEPIME HYDROCHLORIDE 2 G: 2 INJECTION, POWDER, FOR SOLUTION INTRAVENOUS at 16:38

## 2021-11-03 RX ADMIN — FLUTICASONE PROPIONATE 1 SPRAY: 50 SPRAY, METERED NASAL at 20:34

## 2021-11-03 RX ADMIN — LINACLOTIDE 145 MCG: 145 CAPSULE, GELATIN COATED ORAL at 08:50

## 2021-11-03 RX ADMIN — INSULIN ASPART 10 UNITS: 100 INJECTION, SOLUTION INTRAVENOUS; SUBCUTANEOUS at 17:17

## 2021-11-03 RX ADMIN — HYDROXYZINE HYDROCHLORIDE 25 MG: 25 TABLET ORAL at 20:34

## 2021-11-03 RX ADMIN — HUMAN INSULIN 5 UNITS: 100 INJECTION, SOLUTION SUBCUTANEOUS at 11:12

## 2021-11-03 RX ADMIN — SODIUM CHLORIDE 500 MG: 9 INJECTION, SOLUTION INTRAVENOUS at 10:49

## 2021-11-03 RX ADMIN — ASPIRIN 81 MG: 81 TABLET, COATED ORAL at 08:49

## 2021-11-03 RX ADMIN — SODIUM BICARBONATE 100 ML/HR: 84 INJECTION, SOLUTION INTRAVENOUS at 01:54

## 2021-11-03 RX ADMIN — INSULIN ASPART 5 UNITS: 100 INJECTION, SOLUTION INTRAVENOUS; SUBCUTANEOUS at 11:12

## 2021-11-03 RX ADMIN — APIXABAN 5 MG: 5 TABLET, FILM COATED ORAL at 08:48

## 2021-11-03 RX ADMIN — SODIUM BICARBONATE 100 ML/HR: 84 INJECTION, SOLUTION INTRAVENOUS at 15:14

## 2021-11-03 RX ADMIN — SODIUM BICARBONATE TAB 650 MG 650 MG: 650 TAB at 08:48

## 2021-11-03 RX ADMIN — INSULIN DETEMIR 20 UNITS: 100 INJECTION, SOLUTION SUBCUTANEOUS at 20:35

## 2021-11-03 RX ADMIN — MONTELUKAST SODIUM 10 MG: 10 TABLET, COATED ORAL at 20:34

## 2021-11-03 RX ADMIN — INSULIN ASPART 5 UNITS: 100 INJECTION, SOLUTION INTRAVENOUS; SUBCUTANEOUS at 17:18

## 2021-11-03 RX ADMIN — AMLODIPINE BESYLATE 5 MG: 5 TABLET ORAL at 08:49

## 2021-11-03 RX ADMIN — BENZONATATE 100 MG: 100 CAPSULE ORAL at 08:52

## 2021-11-03 RX ADMIN — SODIUM BICARBONATE TAB 650 MG 650 MG: 650 TAB at 20:34

## 2021-11-03 RX ADMIN — ATORVASTATIN CALCIUM 40 MG: 40 TABLET, FILM COATED ORAL at 08:48

## 2021-11-03 RX ADMIN — INSULIN ASPART 12 UNITS: 100 INJECTION, SOLUTION INTRAVENOUS; SUBCUTANEOUS at 08:49

## 2021-11-03 RX ADMIN — INSULIN ASPART 14 UNITS: 100 INJECTION, SOLUTION INTRAVENOUS; SUBCUTANEOUS at 10:49

## 2021-11-03 RX ADMIN — PANTOPRAZOLE SODIUM 40 MG: 40 TABLET, DELAYED RELEASE ORAL at 08:48

## 2021-11-03 RX ADMIN — PREDNISONE 40 MG: 20 TABLET ORAL at 08:48

## 2021-11-03 NOTE — PROGRESS NOTES
[facility]      Mar/chart reviewed  Follow up persistently abnormal ct chest  Still with shortness of breath, worse on exertion  She does feel better overall    Vital Sign Min/Max for last 24 hours  Temp  Min: 98 °F (36.7 °C)  Max: 98.3 °F (36.8 °C)   BP  Min: 96/77  Max: 174/89   Pulse  Min: 72  Max: 111   Resp  Min: 16  Max: 22   SpO2  Min: 90 %  Max: 96 %   Flow (L/min)  Min: 3  Max: 5   Weight  Min: 73.8 kg (162 lb 12.8 oz)  Max: 73.8 kg (162 lb 12.8 oz)     Appears ill, axox3,   perrl, eomi, no icterus,  mmm, no jvd, trachea midline, neck supple,  chest decreased ae bilaterally, no crackles, no wheezes,   rrr,   soft, nt, nd +bs,  no c/c/ e  Skin warm, dry  No rashes    Labs; 11/3: reviewed:  Wbc 7  hgb 9.1  plts 278  Glucose 386  Bun 35  Cr 2.99  Na 134  Bicarb 22  crp 3.4  Micro negative thus far  bnp 1.1  DARELL ssa 2    A/P:  1. Acute hypoxemic respiratory failure -- suspect she will remain oxygen dependent for sometime, possible indefinately.  Unclear how much of her current ct finding represent permanent post COVID fibrosis vs. Reversible inflammation or other process  2. Persistent pneumonia -- explained DDX includes infectious causes, organizing pneumonia, Autoimmune processes, and/or post covid fibrosis/inflammation.  now s/p bronchoscopy, micro negative thus far. Some abnormality to darell and ssa  3. CKD  4. Anemia    Discussed with Dr. Cristal curran some abnormality to darell and ssa and poor prognosis will trial pulse dose steroids  She might benefit from acute rehab.      Will trial brief pulse dose of steroids

## 2021-11-03 NOTE — PLAN OF CARE
Goal Outcome Evaluation:  Plan of Care Reviewed With: patient        Progress: improving  Outcome Summary: Patient transfer from PCU. A&Ox4. Requiring 3L NC, SpO2 satting 95%. No complaints or acute changes at this time. VSS.

## 2021-11-03 NOTE — PROGRESS NOTES
PROGRESS NOTE         Patient Identification:  Name:  Jessica Bravo  Age:  56 y.o.  Sex:  female  :  1965  MRN:  [id number]  Visit Number:  [id number]  Primary Care Provider:  Buck Wallis APRN         LOS: 4 days       ----------------------------------------------------------------------------------------------------------------------  Subjective       Chief Complaints:    Shortness of Breath and Cough        Interval History:      Patient sitting up in bed this morning.  Overall feels better today.  Status post bronchoscopy on 2021.  BAL cultures currently in progress.  WBC normal.  CRP improving at 3.44.  Currently on 3 L per nasal cannula with no apparent distress.    Review of Systems:    Constitutional: no fever, chills and night sweats. No appetite change or unexpected weight change. No fatigue.  Eyes: no eye drainage, itching or redness.  HEENT: no mouth sores, dysphagia or nose bleed.  Respiratory: Positive shortness of breath, cough. Positive production of sputum.  Cardiovascular: no chest pain, no palpitations, no orthopnea.  Gastrointestinal: no nausea, vomiting or diarrhea. No abdominal pain, hematemesis or rectal bleeding.  Genitourinary: no dysuria or polyuria.  Hematologic/lymphatic: no lymph node abnormalities, no easy bruising or easy bleeding.  Musculoskeletal: no muscle or joint pain.  Skin: No rash and no itching.  Neurological: no loss of consciousness, no seizure, no headache.  Behavioral/Psych: no depression or suicidal ideation.  Endocrine: no hot flashes.  Immunologic: negative.    ----------------------------------------------------------------------------------------------------------------------      Objective       \[facility] [place] [facility]\"" Meds:  amLODIPine, 5 mg, Oral, Q24H  apixaban, 5 mg, Oral, Q12H  aspirin, 81 mg, Oral, Daily  atorvastatin, 40 mg, Oral, Daily  cefepime, 2 g, Intravenous, Q24H  cholecalciferol, 50,000 Units, Oral, Weekly  fluticasone, 1 spray,  Nasal, BID  hydrOXYzine, 25 mg, Oral, Nightly  insulin aspart, 0-14 Units, Subcutaneous, TID AC  insulin aspart, 5 Units, Subcutaneous, TID With Meals  insulin detemir, 20 Units, Subcutaneous, Nightly  linaclotide, 145 mcg, Oral, QAM AC  montelukast, 10 mg, Oral, Nightly  pantoprazole, 40 mg, Oral, Daily  sodium bicarbonate, 650 mg, Oral, BID  sodium chloride, 10 mL, Intravenous, Q12H  sodium chloride, 10 mL, Intravenous, Q12H  sodium chloride, 10 mL, Intravenous, Q12H  Vancomycin Pharmacy Intermittent Dosing, , Does not apply, Daily      IV Fluids 1000 mL + additives, 100 mL/hr, Last Rate: 100 mL/hr (11/03/21 0154)      ----------------------------------------------------------------------------------------------------------------------    Vital Signs:  Temp:  [98 °F (36.7 °C)-98.3 °F (36.8 °C)] 98.1 °F (36.7 °C)  Heart Rate:  [] 87  Resp:  [12-29] 16  BP: ()/(64-97) 186/89  Mean Arterial Pressure (Non-Invasive) for the past 24 hrs (Last 3 readings):   Noninvasive MAP (mmHg)   11/03/21 1141 114   11/03/21 1102 110   11/03/21 1002 97     SpO2 Percentage    11/03/21 0902 11/03/21 1002 11/03/21 1102   SpO2: 96% 96% 95%     SpO2:  [90 %-97 %] 95 %  on  Flow (L/min):  [3-5] 3;   Device (Oxygen Therapy): nasal cannula    Body mass index is 27.93 kg/m².  Wt Readings from Last 3 Encounters:   11/03/21 73.8 kg (162 lb 12.8 oz)   10/10/21 72.6 kg (160 lb)   02/05/20 78 kg (172 lb)        Intake/Output Summary (Last 24 hours) at 11/3/2021 1228  Last data filed at 11/3/2021 0800  Gross per 24 hour   Intake 2102.14 ml   Output 1200 ml   Net 902.14 ml     Diet Regular; Consistent Carbohydrate  ----------------------------------------------------------------------------------------------------------------------      Physical Exam:      Constitutional: Chronically ill-appearing.  No respiratory distress.      HENT:  Head: Normocephalic and atraumatic.  Mouth:  Moist mucous membranes.    Eyes:  Conjunctivae and EOM are  normal.  No scleral icterus.  Neck:  Neck supple.  No JVD present.    Cardiovascular:  Normal rate, regular rhythm and normal heart sounds with no murmur. No edema.  Pulmonary/Chest: Diminished lung sounds bilaterally.  Abdominal:  Soft.  Bowel sounds are normal.  No distension and no tenderness.   Musculoskeletal:  No edema, no tenderness, and no deformity.  No swelling or redness of joints.  Neurological:  Alert and oriented to person, place, and time.  No facial droop.  No slurred speech.   Skin:  Skin is warm and dry.  No rash noted.  No pallor.   Psychiatric:  Normal mood and affect.  Behavior is normal.       ----------------------------------------------------------------------------------------------------------------------  Results from last 7 days   Lab Units 11/01/21  0124 10/30/21  1513   CK TOTAL U/L 31  --    TROPONIN T ng/mL  --  <0.010     Results from last 7 days   Lab Units 10/30/21  1513   PROBNP pg/mL 714.7       Results from last 7 days   Lab Units 10/30/21  1356   PH, ARTERIAL pH units 7.359   PO2 ART mm Hg 46.6*   PCO2, ARTERIAL mm Hg 37.5   HCO3 ART mmol/L 21.1     Results from last 7 days   Lab Units 11/03/21  0648 11/02/21  0433 11/01/21  0124 10/31/21  0514 10/31/21  0514 10/30/21  1833 10/30/21  1513 10/30/21  1513   CRP mg/dL 3.44* 5.82* 5.96*   < > 4.02*  --    < > 3.39*   LACTATE mmol/L  --   --   --   --  0.8 2.4*  --  2.1*   WBC 10*3/mm3 7.01 7.26 8.02   < > 9.03  --    < > 8.48   HEMOGLOBIN g/dL 9.1* 9.3* 9.7*   < > 9.7*  --    < > 10.4*   HEMATOCRIT % 30.1* 30.6* 31.7*   < > 31.6*  --    < > 34.6   MCV fL 89.3 88.2 90.8   < > 89.5  --    < > 90.3   MCHC g/dL 30.2* 30.4* 30.6*   < > 30.7*  --    < > 30.1*   PLATELETS 10*3/mm3 278 274 248   < > 237  --    < > 245   INR   --   --   --   --   --   --   --  1.12*    < > = values in this interval not displayed.     Results from last 7 days   Lab Units 11/03/21  0648 11/02/21  0942 11/02/21  0433 11/01/21  0124 11/01/21  0124  10/31/21  0514 10/30/21  1513   SODIUM mmol/L 134* 143 134*   < > 135*   < > 135*   POTASSIUM mmol/L 4.9 4.2 5.8*   < > 5.2   < > 5.0   MAGNESIUM mg/dL  --   --   --   --   --   --  2.1   CHLORIDE mmol/L 101 108* 106   < > 108*   < > 102   CO2 mmol/L 22.9 23.6 19.3*   < > 19.7*   < > 20.2*   BUN mg/dL 35* 36* 37*   < > 32*   < > 32*   CREATININE mg/dL 2.99* 2.94* 3.09*   < > 3.42*   < > 3.28*   EGFR IF NONAFRICN AM mL/min/1.73 16* 16* 15*   < > 14*   < > 14*   CALCIUM mg/dL 7.6* 9.4 8.3*   < > 7.9*   < > 8.1*   GLUCOSE mg/dL 386* 63* 474*   < > 228*   < > 468*   ALBUMIN g/dL 2.78*  --  2.71*  --  2.47*   < > 3.16*   BILIRUBIN mg/dL 0.3  --  0.2  --  0.3   < > 0.3   ALK PHOS U/L 94  --  88  --  91   < > 104   AST (SGOT) U/L 9  --  9  --  7   < > 7   ALT (SGPT) U/L 5  --  5  --  6   < > 9    < > = values in this interval not displayed.   Estimated Creatinine Clearance: 20.2 mL/min (A) (by C-G formula based on SCr of 2.99 mg/dL (H)).  No results found for: AMMONIA    Glucose   Date/Time Value Ref Range Status   11/03/2021 1045 410 (C) 70 - 130 mg/dL Final     Comment:     Treated Patient Meter: [id number] : [id number] PARESH LINDAIDALIA   11/03/2021 0557 375 (H) 70 - 130 mg/dL Final     Comment:     Meter: [id number] : [id number] Rachel Lopez   11/02/2021 2251 385 (H) 70 - 130 mg/dL Final     Comment:     Meter: [id number] : [id number] Rachel Lopez   11/02/2021 1628 415 (C) 70 - 130 mg/dL Final     Comment:     Treated Patient Meter: [id number] : [id number] HURTADO KATHIE   11/02/2021 1120 107 70 - 130 mg/dL Final     Comment:     Meter: [id number] : [id number] HURTADO KATHIE   11/02/2021 0530 422 (C) 70 - 130 mg/dL Final     Comment:     Confirmed by Repeat Meter: [id number] : [id number] Rachel Lopez   11/01/2021 2209 344 (H) 70 - 130 mg/dL Final     Comment:     Meter: [id number] : [id number] Rachel John   11/01/2021 1812 411 (C) 70 - 130 mg/dL Final     Comment:     Treated Patient Meter:  [id number] : [id number] VINH TAN     Lab Results   Component Value Date    HGBA1C 10.50 (H) 10/09/2021     Lab Results   Component Value Date    TSH 0.397 10/09/2021       Blood Culture   Date Value Ref Range Status   10/30/2021 No growth at 24 hours  Preliminary   10/30/2021 No growth at 24 hours  Preliminary     No results found for: URINECX  No results found for: WOUNDCX  No results found for: STOOLCX  No results found for: RESPCX  Pain Management Panel       Pain Management Panel Latest Ref Rng & Units 10/10/2021    CREATININE UR mg/dL 41.4              ----------------------------------------------------------------------------------------------------------------------  Imaging Results (Last 24 Hours)       Procedure Component Value Units Date/Time    XR Chest 1 View [[id number]] Collected: 11/03/21 0847     Updated: 11/03/21 0849    Narrative:      EXAM:    XR Chest, 1 View     EXAM DATE:    11/3/2021 7:58 AM     CLINICAL HISTORY:    soa; J96.01-Acute respiratory failure with hypoxia; J18.9-Pneumonia,  unspecified organism; N17.9-Acute kidney failure, unspecified;  J18.9-Pneumonia, unspecified organism     TECHNIQUE:    Frontal view of the chest.     COMPARISON:    10/30/2021     FINDINGS:    LUNGS:  Patchy bilateral subpleural airspace disease again noted.    PLEURAL SPACE:  Unremarkable.  No pneumothorax.    HEART:  Heart size is stable.    MEDIASTINUM:  Unremarkable.    BONES/JOINTS:  Unremarkable.       Impression:        Patchy bilateral subpleural airspace disease again noted.     This report was finalized on 11/3/2021 8:47 AM by Dr. Landry Haynes MD.               ----------------------------------------------------------------------------------------------------------------------    Assessment/Plan       Assessment/Plan     ASSESSMENT:    1.  Severe sepsis with lactic acid greater than 2 on admission  2.  Bilateral pneumonia    PLAN:      Patient sitting up in bed this morning.  Overall feels  better today.  Status post bronchoscopy on 11/2/2021.  BAL cultures currently in progress.  WBC normal.  CRP improving at 3.44.  Currently on 3 L per nasal cannula with no apparent distress.    Patient was recently hospitalized at our facility from 10/9/2021 through 10/15/2021 for sepsis secondary to COVID-19 pneumonia and was treated with a regimen of Levaquin, Flagyl, micafungin after concern for superimposed bacterial pneumonia and underlying UTI.  Levaquin and Flagyl p.o. course were to be finished on 10/20/2021.    Respiratory panel PCR negative.  MRSA PCR negative.  Legionella negative.  VQ scan from 10/30/2021 reports low probability of PE.  Chest x-ray from 10/30/2021 reports significant interval worsening of bilateral pneumonia.  CT of the chest from 10/30/2021 reports continued extensive bilateral parenchymal airspace disease with progressive consolidation in multiple areas of groundglass opacity particularly in the lower lung zones.     For now recommend to continue vancomycin pharmacy to dose and cefepime pharmacy to dose empirically.  We will continue to follow closely and adjust antibiotic therapy as needed.      Code Status:   Code Status and Medical Interventions:   Ordered at: 10/30/21 1635     Code Status (Patient has no pulse and is not breathing):    CPR (Attempt to Resuscitate)     Medical Interventions (Patient has pulse or is breathing):    Full       Dilia Lockett, APRN  11/03/21  12:28 EDT

## 2021-11-03 NOTE — PLAN OF CARE
Goal Outcome Evaluation:           Progress: no change  Outcome Summary: VSS. Pt alert and oriented. Pt persists with a dry cough. Pt has rested throughout the night. Pt has no other complaints at this time. Will continue to monitor.

## 2021-11-03 NOTE — PROGRESS NOTES
Nephrology Progress Note      Subjective     Reported, no chest pain or shortness of breath    Objective       Vital signs :     Temp:  [98 °F (36.7 °C)-98.1 °F (36.7 °C)] 98 °F (36.7 °C)  Heart Rate:  [72-98] 90  Resp:  [12-29] 18  BP: (131-186)/(64-96) 141/80      Intake/Output Summary (Last 24 hours) at 11/3/2021 1541  Last data filed at 11/3/2021 1424  Gross per 24 hour   Intake 2637.14 ml   Output 1200 ml   Net 1437.14 ml       Physical Exam:    General Appearance : not in acute distress  Lungs : clear to auscultation, respirations regular  Heart :  regular rhythm & normal rate, normal S1, S2 and no murmur, no rub  Abdomen : normal bowel sounds, no masses, no hepatomegaly, no splenomegaly, soft non-tender and no guarding  Extremities : moves extremities well, no edema, no cyanosis and no redness  Neurologic :   orientated to person, place, time and situation, Grossly no focal deficits      Laboratory Data :     Albumin Albumin   Date Value Ref Range Status   11/03/2021 2.78 (L) 3.50 - 5.20 g/dL Final   11/02/2021 2.71 (L) 3.50 - 5.20 g/dL Final   11/01/2021 2.47 (L) 3.50 - 5.20 g/dL Final      Magnesium No results found for: MG       PTH               No results found for: PTH    CBC and coagulation:  Results from last 7 days   Lab Units 11/03/21  0648 11/02/21  0433 11/01/21  0124 10/31/21  0514 10/31/21  0514 10/30/21  1833 10/30/21  1513 10/30/21  1513   LACTATE mmol/L  --   --   --   --  0.8 2.4*  --  2.1*   SED RATE mm/hr  --  65*  --   --   --   --   --   --    CRP mg/dL 3.44* 5.82* 5.96*   < > 4.02*  --    < > 3.39*   WBC 10*3/mm3 7.01 7.26 8.02   < > 9.03  --    < > 8.48   HEMOGLOBIN g/dL 9.1* 9.3* 9.7*   < > 9.7*  --    < > 10.4*   HEMATOCRIT % 30.1* 30.6* 31.7*   < > 31.6*  --    < > 34.6   MCV fL 89.3 88.2 90.8   < > 89.5  --    < > 90.3   MCHC g/dL 30.2* 30.4* 30.6*   < > 30.7*  --    < > 30.1*   PLATELETS 10*3/mm3 278 274 248   < > 237  --    < > 245   INR   --   --   --   --   --   --   --  1.12*    D DIMER QUANT MCGFEU/mL  --   --   --   --   --   --   --  0.90*    < > = values in this interval not displayed.     Acid/base balance:  Results from last 7 days   Lab Units 10/30/21  1356   PH, ARTERIAL pH units 7.359   PO2 ART mm Hg 46.6*   PCO2, ARTERIAL mm Hg 37.5   HCO3 ART mmol/L 21.1     Renal and electrolytes:  Results from last 7 days   Lab Units 11/03/21  0648 11/02/21  0942 11/02/21 0433 11/01/21  0124 11/01/21  0124 10/31/21  0514 10/31/21  0514 10/30/21  1513 10/30/21  1513   SODIUM mmol/L 134* 143 134*  --  135*  --  137   < > 135*   POTASSIUM mmol/L 4.9 4.2 5.8*   < > 5.2   < > 4.6   < > 5.0   MAGNESIUM mg/dL  --   --   --   --   --   --   --   --  2.1   CHLORIDE mmol/L 101 108* 106   < > 108*   < > 108*   < > 102   CO2 mmol/L 22.9 23.6 19.3*   < > 19.7*   < > 20.2*   < > 20.2*   BUN mg/dL 35* 36* 37*   < > 32*   < > 28*   < > 32*   CREATININE mg/dL 2.99* 2.94* 3.09*  --  3.42*  --  3.14*   < > 3.28*   EGFR IF NONAFRICN AM mL/min/1.73 16* 16* 15*   < > 14*   < > 15*   < > 14*   CALCIUM mg/dL 7.6* 9.4 8.3*   < > 7.9*   < > 7.7*   < > 8.1*    < > = values in this interval not displayed.     Estimated Creatinine Clearance: 20.2 mL/min (A) (by C-G formula based on SCr of 2.99 mg/dL (H)).    Liver and pancreatic function:  Results from last 7 days   Lab Units 11/03/21 0648 11/02/21 0433 11/01/21 0124   ALBUMIN g/dL 2.78* 2.71* 2.47*   BILIRUBIN mg/dL 0.3 0.2 0.3   ALK PHOS U/L 94 88 91   AST (SGOT) U/L 9 9 7   ALT (SGPT) U/L 5 5 6         Cardiac:  Results from last 7 days   Lab Units 10/30/21  1513   PROBNP pg/mL 714.7     Liver and pancreatic function:  Results from last 7 days   Lab Units 11/03/21  0648 11/02/21  0433 11/01/21  0124   ALBUMIN g/dL 2.78* 2.71* 2.47*   BILIRUBIN mg/dL 0.3 0.2 0.3   ALK PHOS U/L 94 88 91   AST (SGOT) U/L 9 9 7   ALT (SGPT) U/L 5 5 6       Medications :     amLODIPine, 5 mg, Oral, Q24H  apixaban, 5 mg, Oral, Q12H  aspirin, 81 mg, Oral, Daily  atorvastatin, 40 mg,  Oral, Daily  cefepime, 2 g, Intravenous, Q24H  cholecalciferol, 50,000 Units, Oral, Weekly  fluticasone, 1 spray, Nasal, BID  hydrOXYzine, 25 mg, Oral, Nightly  insulin aspart, 0-14 Units, Subcutaneous, TID AC  insulin aspart, 5 Units, Subcutaneous, TID With Meals  insulin detemir, 20 Units, Subcutaneous, Nightly  linaclotide, 145 mcg, Oral, QAM AC  montelukast, 10 mg, Oral, Nightly  pantoprazole, 40 mg, Oral, Daily  sodium bicarbonate, 650 mg, Oral, BID  sodium chloride, 10 mL, Intravenous, Q12H  sodium chloride, 10 mL, Intravenous, Q12H  sodium chloride, 10 mL, Intravenous, Q12H  Vancomycin Pharmacy Intermittent Dosing, , Does not apply, Daily      IV Fluids 1000 mL + additives, 100 mL/hr, Last Rate: 100 mL/hr (11/03/21 9984)          Assessment/Plan     1-Acute kidney injury on chronic kidney disease stage IV:   2-Hyponatremia, improving.  3-acute  Hypoxic respiratory failure due to pneumonia  4.Essential hypertension  5-metabolic acidosis  6-DM  7. Nephrotic range proteinuria likely due to DKD    Stable renal functions, not far from baseline, not adequate oral intake. DC bicarb fluid and start on NS 75ml/h and to DC when resume adequate oral intake  I will sign off, please call if any questions. Pt should have follow up with primary nephrologist on discharge.      ANA PAULA on CKD stage IV likely pre renal azotemia   Follows Dr. Araiza and was on dialysis for 3 times   4G proteinuria, no significant hematuria previously      Eusebio Duarte MD  11/03/21  15:41 EDT

## 2021-11-03 NOTE — CASE MANAGEMENT/SOCIAL WORK
Discharge Planning Assessment  CELI Alvarado     Patient Name: Jessica Bravo  MRN: [id number]  Today's Date: 11/3/2021    Admit Date: 10/30/2021        Discharge Plan     Row Name 11/03/21 1748       Plan    Plan Pt was admitted on 10/30/21. SS spoke with Pt. Pt plans to return home at discharge. Pt reports that significant other will provide transport. SS will continue to follow and assist with discharge planning.    Patient/Family in Agreement with Plan yes                Expected Discharge Date and Time     Expected Discharge Date Expected Discharge Time    Nov 4, 2021               KAYLEIGH Davies

## 2021-11-04 LAB
ALBUMIN SERPL-MCNC: 3.3 G/DL (ref 3.5–5.2)
ALBUMIN/GLOB SERPL: 0.9 G/DL
ALP SERPL-CCNC: 110 U/L (ref 39–117)
ALT SERPL W P-5'-P-CCNC: 8 U/L (ref 1–33)
ANION GAP SERPL CALCULATED.3IONS-SCNC: 11.4 MMOL/L (ref 5–15)
AST SERPL-CCNC: 8 U/L (ref 1–32)
BACTERIA SPEC AEROBE CULT: NORMAL
BILIRUB SERPL-MCNC: 0.2 MG/DL (ref 0–1.2)
BUN SERPL-MCNC: 45 MG/DL (ref 6–20)
BUN/CREAT SERPL: 15.3 (ref 7–25)
CALCIUM SPEC-SCNC: 8.5 MG/DL (ref 8.6–10.5)
CHLORIDE SERPL-SCNC: 100 MMOL/L (ref 98–107)
CO2 SERPL-SCNC: 24.6 MMOL/L (ref 22–29)
CREAT SERPL-MCNC: 2.95 MG/DL (ref 0.57–1)
CRP SERPL-MCNC: 2.59 MG/DL (ref 0–0.5)
DEPRECATED RDW RBC AUTO: 50 FL (ref 37–54)
ERYTHROCYTE [DISTWIDTH] IN BLOOD BY AUTOMATED COUNT: 15.7 % (ref 12.3–15.4)
GBM IGG SER-ACNC: 6 UNITS (ref 0–20)
GFR SERPL CREATININE-BSD FRML MDRD: 16 ML/MIN/1.73
GLOBULIN UR ELPH-MCNC: 3.6 GM/DL
GLUCOSE BLDC GLUCOMTR-MCNC: 275 MG/DL (ref 70–130)
GLUCOSE BLDC GLUCOMTR-MCNC: 311 MG/DL (ref 70–130)
GLUCOSE BLDC GLUCOMTR-MCNC: 328 MG/DL (ref 70–130)
GLUCOSE BLDC GLUCOMTR-MCNC: 356 MG/DL (ref 70–130)
GLUCOSE BLDC GLUCOMTR-MCNC: 361 MG/DL (ref 70–130)
GLUCOSE BLDC GLUCOMTR-MCNC: 406 MG/DL (ref 70–130)
GLUCOSE SERPL-MCNC: 341 MG/DL (ref 65–99)
GRAM STN SPEC: NORMAL
HCT VFR BLD AUTO: 30.6 % (ref 34–46.6)
HGB BLD-MCNC: 9.4 G/DL (ref 12–15.9)
LAB AP CASE REPORT: NORMAL
MCH RBC QN AUTO: 26.6 PG (ref 26.6–33)
MCHC RBC AUTO-ENTMCNC: 30.7 G/DL (ref 31.5–35.7)
MCV RBC AUTO: 86.7 FL (ref 79–97)
PLA2R IGG SER IA-ACNC: <1.8 RU/ML (ref 0–19.9)
PLATELET # BLD AUTO: 340 10*3/MM3 (ref 140–450)
PMV BLD AUTO: 9.2 FL (ref 6–12)
POTASSIUM SERPL-SCNC: 4.9 MMOL/L (ref 3.5–5.2)
PROT SERPL-MCNC: 6.9 G/DL (ref 6–8.5)
RBC # BLD AUTO: 3.53 10*6/MM3 (ref 3.77–5.28)
SODIUM SERPL-SCNC: 136 MMOL/L (ref 136–145)
VANCOMYCIN SERPL-MCNC: 18.6 MCG/ML (ref 5–40)
WBC # BLD AUTO: 9.85 10*3/MM3 (ref 3.4–10.8)

## 2021-11-04 PROCEDURE — 25010000002 CEFEPIME PER 500 MG: Performed by: STUDENT IN AN ORGANIZED HEALTH CARE EDUCATION/TRAINING PROGRAM

## 2021-11-04 PROCEDURE — 97162 PT EVAL MOD COMPLEX 30 MIN: CPT

## 2021-11-04 PROCEDURE — 25010000002 VANCOMYCIN PER 500 MG: Performed by: INTERNAL MEDICINE

## 2021-11-04 PROCEDURE — 80202 ASSAY OF VANCOMYCIN: CPT

## 2021-11-04 PROCEDURE — 63710000001 INSULIN ASPART PER 5 UNITS: Performed by: STUDENT IN AN ORGANIZED HEALTH CARE EDUCATION/TRAINING PROGRAM

## 2021-11-04 PROCEDURE — 63710000001 INSULIN ASPART PER 5 UNITS: Performed by: PHYSICIAN ASSISTANT

## 2021-11-04 PROCEDURE — 25010000002 METHYLPREDNISOLONE PER 125 MG: Performed by: INTERNAL MEDICINE

## 2021-11-04 PROCEDURE — 80053 COMPREHEN METABOLIC PANEL: CPT | Performed by: STUDENT IN AN ORGANIZED HEALTH CARE EDUCATION/TRAINING PROGRAM

## 2021-11-04 PROCEDURE — 82962 GLUCOSE BLOOD TEST: CPT

## 2021-11-04 PROCEDURE — 99232 SBSQ HOSP IP/OBS MODERATE 35: CPT | Performed by: INTERNAL MEDICINE

## 2021-11-04 PROCEDURE — 63710000001 INSULIN DETEMIR PER 5 UNITS: Performed by: STUDENT IN AN ORGANIZED HEALTH CARE EDUCATION/TRAINING PROGRAM

## 2021-11-04 PROCEDURE — 86140 C-REACTIVE PROTEIN: CPT | Performed by: STUDENT IN AN ORGANIZED HEALTH CARE EDUCATION/TRAINING PROGRAM

## 2021-11-04 PROCEDURE — 97165 OT EVAL LOW COMPLEX 30 MIN: CPT

## 2021-11-04 PROCEDURE — 85027 COMPLETE CBC AUTOMATED: CPT | Performed by: STUDENT IN AN ORGANIZED HEALTH CARE EDUCATION/TRAINING PROGRAM

## 2021-11-04 PROCEDURE — 99232 SBSQ HOSP IP/OBS MODERATE 35: CPT | Performed by: STUDENT IN AN ORGANIZED HEALTH CARE EDUCATION/TRAINING PROGRAM

## 2021-11-04 RX ORDER — METHYLPREDNISOLONE SODIUM SUCCINATE 125 MG/2ML
125 INJECTION, POWDER, LYOPHILIZED, FOR SOLUTION INTRAMUSCULAR; INTRAVENOUS EVERY 12 HOURS
Status: COMPLETED | OUTPATIENT
Start: 2021-11-04 | End: 2021-11-04

## 2021-11-04 RX ADMIN — INSULIN ASPART 12 UNITS: 100 INJECTION, SOLUTION INTRAVENOUS; SUBCUTANEOUS at 17:43

## 2021-11-04 RX ADMIN — INSULIN ASPART 14 UNITS: 100 INJECTION, SOLUTION INTRAVENOUS; SUBCUTANEOUS at 20:56

## 2021-11-04 RX ADMIN — SODIUM CHLORIDE, PRESERVATIVE FREE 10 ML: 5 INJECTION INTRAVENOUS at 08:29

## 2021-11-04 RX ADMIN — INSULIN DETEMIR 30 UNITS: 100 INJECTION, SOLUTION SUBCUTANEOUS at 20:56

## 2021-11-04 RX ADMIN — METHYLPREDNISOLONE SODIUM SUCCINATE 125 MG: 125 INJECTION, POWDER, FOR SOLUTION INTRAMUSCULAR; INTRAVENOUS at 09:48

## 2021-11-04 RX ADMIN — METHYLPREDNISOLONE SODIUM SUCCINATE 125 MG: 125 INJECTION, POWDER, FOR SOLUTION INTRAMUSCULAR; INTRAVENOUS at 20:56

## 2021-11-04 RX ADMIN — AMLODIPINE BESYLATE 5 MG: 5 TABLET ORAL at 08:27

## 2021-11-04 RX ADMIN — FLUTICASONE PROPIONATE 1 SPRAY: 50 SPRAY, METERED NASAL at 08:28

## 2021-11-04 RX ADMIN — SODIUM CHLORIDE, PRESERVATIVE FREE 10 ML: 5 INJECTION INTRAVENOUS at 21:01

## 2021-11-04 RX ADMIN — ATORVASTATIN CALCIUM 40 MG: 40 TABLET, FILM COATED ORAL at 08:27

## 2021-11-04 RX ADMIN — FLUTICASONE PROPIONATE 1 SPRAY: 50 SPRAY, METERED NASAL at 20:55

## 2021-11-04 RX ADMIN — INSULIN ASPART 5 UNITS: 100 INJECTION, SOLUTION INTRAVENOUS; SUBCUTANEOUS at 08:27

## 2021-11-04 RX ADMIN — SODIUM BICARBONATE TAB 650 MG 650 MG: 650 TAB at 08:27

## 2021-11-04 RX ADMIN — INSULIN ASPART 5 UNITS: 100 INJECTION, SOLUTION INTRAVENOUS; SUBCUTANEOUS at 12:17

## 2021-11-04 RX ADMIN — MONTELUKAST SODIUM 10 MG: 10 TABLET, COATED ORAL at 20:56

## 2021-11-04 RX ADMIN — APIXABAN 5 MG: 5 TABLET, FILM COATED ORAL at 20:56

## 2021-11-04 RX ADMIN — LINACLOTIDE 145 MCG: 145 CAPSULE, GELATIN COATED ORAL at 08:28

## 2021-11-04 RX ADMIN — INSULIN ASPART 10 UNITS: 100 INJECTION, SOLUTION INTRAVENOUS; SUBCUTANEOUS at 12:18

## 2021-11-04 RX ADMIN — INSULIN ASPART 10 UNITS: 100 INJECTION, SOLUTION INTRAVENOUS; SUBCUTANEOUS at 08:27

## 2021-11-04 RX ADMIN — HYDROXYZINE HYDROCHLORIDE 25 MG: 25 TABLET ORAL at 20:56

## 2021-11-04 RX ADMIN — PANTOPRAZOLE SODIUM 40 MG: 40 TABLET, DELAYED RELEASE ORAL at 08:27

## 2021-11-04 RX ADMIN — SODIUM BICARBONATE TAB 650 MG 650 MG: 650 TAB at 20:56

## 2021-11-04 RX ADMIN — VANCOMYCIN HYDROCHLORIDE 1000 MG: 1 INJECTION, POWDER, LYOPHILIZED, FOR SOLUTION INTRAVENOUS at 12:19

## 2021-11-04 RX ADMIN — INSULIN ASPART 10 UNITS: 100 INJECTION, SOLUTION INTRAVENOUS; SUBCUTANEOUS at 17:42

## 2021-11-04 RX ADMIN — HYDROCODONE BITARTRATE AND ACETAMINOPHEN 10 ML: 7.5; 325 SOLUTION ORAL at 00:00

## 2021-11-04 RX ADMIN — SODIUM CHLORIDE, PRESERVATIVE FREE 10 ML: 5 INJECTION INTRAVENOUS at 08:28

## 2021-11-04 RX ADMIN — APIXABAN 5 MG: 5 TABLET, FILM COATED ORAL at 08:27

## 2021-11-04 RX ADMIN — ASPIRIN 81 MG: 81 TABLET, COATED ORAL at 08:28

## 2021-11-04 RX ADMIN — CEFEPIME HYDROCHLORIDE 2 G: 2 INJECTION, POWDER, FOR SOLUTION INTRAVENOUS at 17:42

## 2021-11-04 NOTE — CASE MANAGEMENT/SOCIAL WORK
Continued Stay Note  CELI Christiano     Patient Name: Jessica Bravo  MRN: [id number]  Today's Date: 11/4/2021    Admit Date: 10/30/2021     Discharge Plan     Row Name 11/04/21 1432       Plan    Plan [facility] @ [facility] verified continuous home oxygen 3L NC and portable tanks was set up for patient in her home on 10/29/21. CM will follow with SS and assist as needed.               Discharge Codes    No documentation.               Expected Discharge Date and Time     Expected Discharge Date Expected Discharge Time    Nov 6, 2021             Judith Lee RN

## 2021-11-04 NOTE — PROGRESS NOTES
[facility] HOSPITALIST PROGRESS NOTE     Patient Identification:  Name:  Jessica Bravo  Age:  56 y.o.  Sex:  female  :  1965  MRN:  [id number]  Visit Number:  [id number]  ROOM: [address] [place] [address]     Primary Care Provider:  Buck Wallis APRN    Length of stay in inpatient status:  5    Subjective     Chief Compliant:    Chief Complaint   Patient presents with   • Shortness of Breath   • Cough       History of Presenting Illness:    Patient seen in follow-up for acute hypoxic respiratory failure. Still complains of persistent dyspnea, with a dry nonproductive cough. Renal function about the same.  Hemodynamically stable, 93% on 3L. spO2 88% on 4L on exertion. No adverse events noted by nursing.    Objective     Current Hospital Meds:amLODIPine, 5 mg, Oral, Q24H  apixaban, 5 mg, Oral, Q12H  aspirin, 81 mg, Oral, Daily  atorvastatin, 40 mg, Oral, Daily  cefepime, 2 g, Intravenous, Q24H  cholecalciferol, 50,000 Units, Oral, Weekly  fluticasone, 1 spray, Nasal, BID  hydrOXYzine, 25 mg, Oral, Nightly  insulin aspart, 0-14 Units, Subcutaneous, 4x Daily PC & at Bedtime  insulin aspart, 5 Units, Subcutaneous, TID With Meals  insulin detemir, 20 Units, Subcutaneous, Nightly  linaclotide, 145 mcg, Oral, QAM AC  methylPREDNISolone sodium succinate, 125 mg, Intravenous, Q12H  montelukast, 10 mg, Oral, Nightly  pantoprazole, 40 mg, Oral, Daily  sodium bicarbonate, 650 mg, Oral, BID  sodium chloride, 10 mL, Intravenous, Q12H  sodium chloride, 10 mL, Intravenous, Q12H  sodium chloride, 10 mL, Intravenous, Q12H  vancomycin, 1,000 mg, Intravenous, Once  Vancomycin Pharmacy Intermittent Dosing, , Does not apply, Daily         Current Antimicrobial Therapy:  Anti-Infectives (From admission, onward)    Ordered     Dose/Rate Route Frequency Start Stop    21 0855  vancomycin 1 g/250 mL 0.9% NS (vial-mate)        Ordering Provider: Stephan Bolton MD    1,000 mg  over 60 Minutes Intravenous Once 21 1200       11/02/21 1401  vancomycin 1 g/250 mL 0.9% NS (vial-mate)        Ordering Provider: Stephan Bolton MD    1,000 mg  over 60 Minutes Intravenous Once 11/02/21 1600 11/02/21 1743    10/30/21 1807  cefepime 2 gm IVPB in 100 ml NS (VTB)        Ordering Provider: Roland Alberto DO    2 g  over 4 Hours Intravenous Every 24 Hours 10/31/21 1700 11/07/21 1659    10/31/21 1417  vancomycin 1 g/250 mL 0.9% NS (vial-mate)        Ordering Provider: Stephan Bolton MD    1,000 mg  over 60 Minutes Intravenous Once 10/31/21 1600 10/31/21 1654    10/30/21 1813  vancomycin 1500 mg/500 mL 0.9% NS IVPB ([facility])        Ordering Provider: Stephan Bolton MD    20 mg/kg × 75.2 kg  over 90 Minutes Intravenous Once 10/30/21 1900 10/30/21 2107    10/30/21 1813  Vancomycin Pharmacy Intermittent Dosing        Ordering Provider: Roland Alberto DO     Does not apply Daily 10/30/21 1900 11/06/21 0859    10/30/21 1627  metroNIDAZOLE (FLAGYL) 500 mg/100mL IVPB        Ordering Provider: Michael Puente MD    500 mg  over 30 Minutes Intravenous Once 10/30/21 1629 10/30/21 1758        Current Diuretic Therapy:  Diuretics (From admission, onward)    None        ----------------------------------------------------------------------------------------------------------------------  Vital Signs:  Temp:  [97.7 °F (36.5 °C)-98.4 °F (36.9 °C)] 98.1 °F (36.7 °C)  Heart Rate:  [74-95] 74  Resp:  [18-20] 20  BP: (136-145)/(64-84) 137/64  SpO2:  [90 %-96 %] 92 %  on  Flow (L/min):  [3] 3;   Device (Oxygen Therapy): nasal cannula  Body mass index is 27.93 kg/m².    Wt Readings from Last 3 Encounters:   11/03/21 73.8 kg (162 lb 12.8 oz)   10/10/21 72.6 kg (160 lb)   02/05/20 78 kg (172 lb)     Intake & Output (last 3 days)       11/01 0701  11/02 0700 11/02 0701 11/03 0700 11/03 0701  11/04 0700 11/04 0701  11/05 0700    P.O. 540 0 980 240    I.V. (mL/kg) 1285.2 (17.5) 1982.1 (26.9) 178.6 (2.4)     Total Intake(mL/kg) 1825.2 (24.8) 1982.1 (26.9)  1158.6 (15.7) 240 (3.3)    Urine (mL/kg/hr) 1050 (0.6) 900 (0.5) 300 (0.2)     Total Output 1050 900 300     Net +775.2 +1082.1 +858.6 +240            Urine Unmeasured Occurrence   3 x 2 x    Stool Unmeasured Occurrence   1 x         Diet Regular; Consistent Carbohydrate  ----------------------------------------------------------------------------------------------------------------------  Physical exam:  Constitutional: older female, looks better today, no acute resp distress  HENT:  Head:  Normocephalic and atraumatic.  Mouth:  Moist mucous membranes.    Eyes:  Conjunctivae and EOM are normal. No scleral icterus.   Cardiovascular:  Normal rate, regular rhythm and normal heart sounds with no murmur. No JVD.   Pulmonary/Chest:  No respiratory distress, no wheezes, no crackles, with normal breath sounds. Diminished b/l. Unlabored. No accessory muscle use.  Abdominal:  Soft. No distension and no tenderness.  Bowel sounds present. No rebound or guarding.   Musculoskeletal:  No tenderness, and no deformity.  No red or swollen joints anywhere.    Neurological:  Alert and oriented to person, place, and time.  No cranial nerve deficit.   Nonfocal.   Skin:  Skin is warm and dry. No rash noted. No pallor.   Peripheral vascular:  No clubbing, no cyanosis, no edema. Pedal and tibial pulses 2/4 bilaterally.   ----------------------------------------------------------------------------------------------------------------------  Results from last 7 days   Lab Units 11/04/21  0409 11/03/21  0648 11/02/21  0433 11/01/21  0124 10/31/21  0514 10/30/21  1833 10/30/21  1513 10/30/21  1513   CRP mg/dL 2.59* 3.44* 5.82*   < > 4.02*  --    < > 3.39*   LACTATE mmol/L  --   --   --   --  0.8 2.4*  --  2.1*   WBC 10*3/mm3 9.85 7.01 7.26   < > 9.03  --    < > 8.48   HEMOGLOBIN g/dL 9.4* 9.1* 9.3*   < > 9.7*  --    < > 10.4*   HEMATOCRIT % 30.6* 30.1* 30.6*   < > 31.6*  --    < > 34.6   MCV fL 86.7 89.3 88.2   < > 89.5  --    < > 90.3   MCHC  g/dL 30.7* 30.2* 30.4*   < > 30.7*  --    < > 30.1*   PLATELETS 10*3/mm3 340 278 274   < > 237  --    < > 245   INR   --   --   --   --   --   --   --  1.12*    < > = values in this interval not displayed.     Results from last 7 days   Lab Units 10/30/21  1356   PH, ARTERIAL pH units 7.359   PO2 ART mm Hg 46.6*   PCO2, ARTERIAL mm Hg 37.5   HCO3 ART mmol/L 21.1     Results from last 7 days   Lab Units 11/04/21  0409 11/03/21  0648 11/02/21  0942 11/02/21  0433 11/02/21  0433 10/31/21  0514 10/30/21  1513   SODIUM mmol/L 136 134* 143   < > 134*   < > 135*   POTASSIUM mmol/L 4.9 4.9 4.2   < > 5.8*   < > 5.0   MAGNESIUM mg/dL  --   --   --   --   --   --  2.1   CHLORIDE mmol/L 100 101 108*   < > 106   < > 102   CO2 mmol/L 24.6 22.9 23.6   < > 19.3*   < > 20.2*   BUN mg/dL 45* 35* 36*   < > 37*   < > 32*   CREATININE mg/dL 2.95* 2.99* 2.94*   < > 3.09*   < > 3.28*   EGFR IF NONAFRICN AM mL/min/1.73 16* 16* 16*   < > 15*   < > 14*   CALCIUM mg/dL 8.5* 7.6* 9.4   < > 8.3*   < > 8.1*   GLUCOSE mg/dL 341* 386* 63*   < > 474*   < > 468*   ALBUMIN g/dL 3.30* 2.78*  --   --  2.71*   < > 3.16*   BILIRUBIN mg/dL 0.2 0.3  --   --  0.2   < > 0.3   ALK PHOS U/L 110 94  --   --  88   < > 104   AST (SGOT) U/L 8 9  --   --  9   < > 7   ALT (SGPT) U/L 8 5  --   --  5   < > 9    < > = values in this interval not displayed.   Estimated Creatinine Clearance: 20.4 mL/min (A) (by C-G formula based on SCr of 2.95 mg/dL (H)).  No results found for: AMMONIA  Results from last 7 days   Lab Units 11/01/21  0124 10/30/21  1513   CK TOTAL U/L 31  --    TROPONIN T ng/mL  --  <0.010     Results from last 7 days   Lab Units 10/30/21  1513   PROBNP pg/mL 714.7         Glucose   Date/Time Value Ref Range Status   11/04/2021 1059 328 (H) 70 - 130 mg/dL Final     Comment:     Meter: [id number] : [id number] Brittany Collett   11/04/2021 0626 311 (H) 70 - 130 mg/dL Final     Comment:     Meter: [id number] : [id number] MEKHI WHITE   11/04/2021  0203 361 (H) 70 - 130 mg/dL Final     Comment:     Meter: [id number] : [id number] MYRON MATT   11/03/2021 2350 464 (C) 70 - 130 mg/dL Final     Comment:     RN Notified Meter: [id number] : [id number] MYRON MATT   11/03/2021 2213 523 (C) 70 - 130 mg/dL Final     Comment:     RN Notified Meter: [id number] : [id number] MYRON MATT   11/03/2021 2033 544 (C) 70 - 130 mg/dL Final     Comment:     Confirmed by Repeat Meter: [id number] : [id number] MYRON MATT   11/03/2021 1701 329 (H) 70 - 130 mg/dL Final     Comment:     Meter: [id number] : [id number] DANIELLE VELÁSQUEZ   11/03/2021 1045 410 (C) 70 - 130 mg/dL Final     Comment:     Treated Patient Meter: [id number] : [id number] PARESH HENRY     Lab Results   Component Value Date    TSH 0.397 10/09/2021     No results found for: PREGTESTUR, PREGSERUM, HCG, HCGQUANT  Pain Management Panel     Pain Management Panel Latest Ref Rng & Units 10/10/2021    CREATININE UR mg/dL 41.4        Brief Urine Lab Results  (Last result in the past 365 days)      Color   Clarity   Blood   Leuk Est   Nitrite   Protein   CREAT   Urine HCG        10/10/21 1527             41.4             Blood Culture   Date Value Ref Range Status   10/30/2021 No growth at 24 hours  Preliminary   10/30/2021 No growth at 24 hours  Preliminary     No results found for: URINECX  No results found for: WOUNDCX  No results found for: STOOLCX  No results found for: RESPCX  No results found for: AFBCX  Results from last 7 days   Lab Units 11/04/21  0409 11/03/21  0648 11/02/21  0433 11/01/21  0124 10/31/21  0514 10/30/21  1833 10/30/21  1513   LACTATE mmol/L  --   --   --   --  0.8 2.4* 2.1*   SED RATE mm/hr  --   --  65*  --   --   --   --    CRP mg/dL 2.59* 3.44* 5.82* 5.96* 4.02*  --  3.39*       I have personally looked at the labs and they are summarized  above.  ----------------------------------------------------------------------------------------------------------------------  Detailed radiology reports for the last 24 hours:  Imaging Results (Last 24 Hours)     ** No results found for the last 24 hours. **        Assessment & Plan      Patient is a 56-year-old female with history significant for CKD stage IV presented with worsening shortness of breath and CAP.    #Sepsis secondary to CAP vs   #Acute hypoxemic respiratory failure due to CAP vs   #Recent COVID-19 dx  --Patient remains hemodynamically stable.  93% on 3 L nasal cannula, 88% on 4L on exertion.  Does not wear home O2.  Recently hospitalized at this facility in [facility] with Covid. Significant hypoxia on exertion, spO2 drops in the 70s on ambulation to restroom. + SSA Ro, + RNP ab.  --Admission labs notable for CRP 3.4, WBC 8K, ABG 7.35/37/46 on room air, BUN/creatinine 32/3.2. ESR 65.   --CT chest without noted extensive bilateral groundglass opacities with progressive consolidation in multiple areas  --repeat CXR noted persistent bilateral infiltrates, no significant change  --Echocardiogram with preserved ejection fraction, no diastolic dysfunction  --Legionella, respiratory panel, strep pneumo, MRSA nasal swab negative  --follow up anca and complement levels, anti GBM, jewel profile, anti PLAR2  --follow up on BAL cx  --repeat CXR tomorrow  --discussed with pulmonology given significant hypoxia on exertion and inflammatory changes on CXR with abnormal +ssa/rnp ab, receiving pulse dose roids, appreciate Pulm's assistance  --Continue IV cefepime, vancomycin per ID recommendations  --Pulmonology following, appreciate recommendations     #ANA PAULA on CKD IV  #Metabolic acidosis  #Hyperkalemia  #Solitary kidney  --baseline renal function Cr 2.3. Had previously dialyzed 3x week. Previously referred to [facility] for 2nd opinion regarding transplant evaluation after not listed by [facility] with concern for medical  noncompliance & poor social support. reportedly neg AI workup when preformed yrs ago when renal disease initially presented.  --Right kidney severely atrophic due to diabetic nephropathy  --UA with 4 g proteinuria, hematuria. CK wnl.   --Renal ultrasound of left kidney unremarkable  --c/w sodium bicarb, target serum bicarb 22  --Renally dose medications to GFR less than 15, avoid nephrotoxins  --Nephrology following, appreciate recs     #Hyperglycemia with type 2 DM  --A1c 10.9%. am glucose 341-exacerbated by steroid use. Basal had been decreased prior d/t wide swings of hyper/hypoglycemia.   --increase basal to 30u, mealtime 10u tidac, SSI, will be difficult to control with steroids above, will continue to adjust accordingly     #Elevated d-dimer  --d-dimer 0.90. VQ scan low probability of PE.  --CT chest PE protocol not obtained given ANA PAULA on CKD IV.   --US venous doppler neg for DVT  --continue home Eliquis    #HLD, continue ASA 81, statin  #HTN, norvasc    #Medical Non-Adherence  --Given patient's reported choices and actions to not adhere to medical treatments and recommendations, this will drastically affect their short and long term morbidity and mortality.      CHECKLIST:  Abx: vancomycin, cefepime  Steroids: solumedrol  VTE: eliquis  GI ppx: ppi  Diet: diabetic, renal  Code: CPR, full  Dispo: Patient remains hypoxic, especially on exertion. Anticipate >2 MN stay. Hopefully can get her home over the weekend. Disposition expected Home when medically stable.    Roland Alberto,   [facility]  11/04/21  12:24 EDT

## 2021-11-04 NOTE — THERAPY EVALUATION
Acute Care - Occupational Therapy Initial Evaluation  CELI lAvarado     Patient Name: Jessica Bravo  : 1965  MRN: [id number]  Today's Date: 2021  Onset of Illness/Injury or Date of Surgery: 10/30/21     Referring Physician: Remy    Admit Date: 10/30/2021       ICD-10-CM ICD-9-CM   1. Acute respiratory failure with hypoxia (HCC)  J96.01 518.81   2. Pneumonia of both lungs due to infectious organism, unspecified part of lung  J18.9 483.8   3. ANA PAULA (acute kidney injury) (HCC)  N17.9 584.9   4. Pneumonia of both lower lobes due to infectious organism  J18.9 486     Patient Active Problem List   Diagnosis   • Essential hypertension   • Lower extremity edema   • Pneumonia of both lower lobes due to infectious organism   • Acute respiratory failure with hypoxia (HCC)     Past Medical History:   Diagnosis Date   • Chronic kidney disease     only has one kidney, has been in hopsital 3 times since last visit    • ESRD on hemodialysis ([facility])    • Essential hypertension    • Hepatic steatosis    • History of noncompliance with medical treatment    • Pre-transplant evaluation for kidney transplant 2021    Declined by the [facility]   • Single kidney    • Type 2 diabetes mellitus (HCC)      Past Surgical History:   Procedure Laterality Date   • BRONCHOSCOPY N/A 2021    Procedure: BRONCHOSCOPY;  Surgeon: Brodie Dobbins MD;  Location: [facility];  Service: Pulmonary;  Laterality: N/A;   • HYSTERECTOMY           OT ASSESSMENT FLOWSHEET (last 12 hours)     OT Evaluation and Treatment     Row Name 21 1223                   OT Time and Intention    Document Type evaluation  -KR        Mode of Treatment occupational therapy  -KR        Patient Effort adequate  -KR                  General Information    General Observations of Patient alert/coopertaive  -KR                  Cognition    Affect/Mental Status (Cognitive) WFL  -KR        Orientation Status (Cognition) oriented x 3  -KR        Follows  Commands (Cognition) WFL  -KR                  Range of Motion Comprehensive    Comment, General Range of Motion BUE WFL  -KR                  Strength Comprehensive (MMT)    Comment, General Manual Muscle Testing (MMT) Assessment BUE WFL  -KR                  Activities of Daily Living    BADL Assessment/Intervention bathing; upper body dressing; lower body dressing; grooming; feeding; toileting  -KR                  Bathing Assessment/Intervention    Dolores Level (Bathing) bathing skills; contact guard assist  -KR                  Upper Body Dressing Assessment/Training    Dolores Level (Upper Body Dressing) upper body dressing skills; set up  -KR                  Lower Body Dressing Assessment/Training    Dolores Level (Lower Body Dressing) lower body dressing skills; contact guard assist  -KR                  Grooming Assessment/Training    Dolores Level (Grooming) grooming skills; set up  -KR                  Self-Feeding Assessment/Training    Dolores Level (Feeding) feeding skills; set up  -KR                  Toileting Assessment/Training    Dolores Level (Toileting) toileting skills; contact guard assist  -KR                  Plan of Care Review    Plan of Care Reviewed With patient  -KR                  Therapy Assessment/Plan (OT)    Comment, Therapy Assessment/Plan (OT) Pt. presents with only minimal fxl deficits at this time and will not require further skilled OT training on acute level. OT will defer further activity to PT as deemed necessary for high level gait/balance/endurance training  -KR                  Therapy Plan Review/Discharge Plan (OT)    Anticipated Discharge Disposition (OT) home; home with assist  -KR              User Key  (r) = Recorded By, (t) = Taken By, (c) = Cosigned By    Initials Name Effective Dates    Michael Miller, OT 06/16/21 -                        OT Recommendation and Plan     Plan of Care Review  Plan of Care Reviewed With:  patient  Plan of Care Reviewed With: patient        Time Calculation:     Therapy Charges for Today     Code Description Service Date Service Provider Modifiers Qty    51101789888 HC OT EVAL LOW COMPLEXITY 4 11/4/2021 Michael Hernández OT GO 1               Michael Hernández OT  11/4/2021

## 2021-11-04 NOTE — PROGRESS NOTES
[facility]      Mar/chart reviewed  Follow up persistently abnormal ct chest  Still with shortness of breath, worse on exertion  She does feel better overall  She is  Up ambulating in the [facility] today on 4lpm and sats are 88% and she is tolerating this well.     Vital Sign Min/Max for last 24 hours  Temp  Min: 97.7 °F (36.5 °C)  Max: 98.4 °F (36.9 °C)   BP  Min: 136/84  Max: 186/89   Pulse  Min: 75  Max: 95   Resp  Min: 16  Max: 26   SpO2  Min: 90 %  Max: 96 %   Flow (L/min)  Min: 3  Max: 3   No data recorded     Appears ill, axox3,   perrl, eomi, no icterus,  mmm, no jvd, trachea midline, neck supple,  chest decreased ae bilaterally, no crackles, no wheezes,   rrr,   soft, nt, nd +bs,  no c/c/ e  Skin warm, dry  No rashes  Normal gait.     Labs: 11/4: reviewed:  Wbc 9  hgb 9.4  plts 240  Bun 45  Cr 2.95  Bicarb 24    A/P:  1. Acute hypoxemic respiratory failure -- suspect she will remain oxygen dependent for sometime, possible indefinately.  Unclear how much of her current ct finding represent permanent post COVID fibrosis vs. Reversible inflammation or other process  2. Persistent pneumonia -- explained DDX includes infectious causes, organizing pneumonia, Autoimmune processes, and/or post covid fibrosis/inflammation.  now s/p bronchoscopy, micro negative thus far. Some abnormality to angela and ssa  3. CKD  4. Anemia     Discussed with Dr. Bee given some abnormality to angela and ssa and poor prognosis will trial pulse dose steroids  Solumedrol at 125 today  Will plan 40 tid tomorrow and then back to 40 prednisone a day after that

## 2021-11-04 NOTE — THERAPY EVALUATION
Acute Care - Physical Therapy Initial Evaluation   Christiano     Patient Name: Jessica Bravo  : 1965  MRN: [id number]  Today's Date: 2021   Onset of Illness/Injury or Date of Surgery: 10/30/21  Visit Dx:     ICD-10-CM ICD-9-CM   1. Acute respiratory failure with hypoxia (HCC)  J96.01 518.81   2. Pneumonia of both lungs due to infectious organism, unspecified part of lung  J18.9 483.8   3. ANA PAULA (acute kidney injury) (HCC)  N17.9 584.9   4. Pneumonia of both lower lobes due to infectious organism  J18.9 486     Patient Active Problem List   Diagnosis   • Essential hypertension   • Lower extremity edema   • Pneumonia of both lower lobes due to infectious organism   • Acute respiratory failure with hypoxia (HCC)     Past Medical History:   Diagnosis Date   • Chronic kidney disease     only has one kidney, has been in hopsital 3 times since last visit    • ESRD on hemodialysis (HCC)    • Essential hypertension    • Hepatic steatosis    • History of noncompliance with medical treatment    • Pre-transplant evaluation for kidney transplant 2021    Declined by the [facility]   • Single kidney    • Type 2 diabetes mellitus (HCC)      Past Surgical History:   Procedure Laterality Date   • BRONCHOSCOPY N/A 2021    Procedure: BRONCHOSCOPY;  Surgeon: Brodie Dobbins MD;  Location: [facility];  Service: Pulmonary;  Laterality: N/A;   • HYSTERECTOMY       PT Assessment (last 12 hours)     PT Evaluation and Treatment     Row Name 21 1150          Physical Therapy Time and Intention    Subjective Information complains of; weakness; fatigue; dyspnea  -CW     Document Type evaluation  -CW     Mode of Treatment physical therapy  -CW     Patient Effort good  -CW     Symptoms Noted During/After Treatment fatigue  -CW     Comment Patient agreeable to physical therapy evaluation this date. She is somewhat familiar to therapist from her recent admission to this facility on 10/9/21. She reports that she was  only home about 8 days before she came back on the 30th. She states that she has remained independent, but her endurance/capacity is very low.  -CW     Row Name 11/04/21 1150          General Information    Patient Profile Reviewed yes  -CW     Onset of Illness/Injury or Date of Surgery 10/30/21  -CW     Referring Physician Remy  -CW     Patient Observations alert; cooperative; agree to therapy  -CW     Prior Level of Function independent:  -CW     Equipment Currently Used at Home oxygen  Had for one day following last admission.  -CW     Row Name 11/04/21 1150          Previous Level of Function/Home Environm    BADLs, Premorbid Functional Level independent  -CW     Household Ambulation, Premorbid Functional Level independent  -CW     Row Name 11/04/21 1150          Living Environment    Current Living Arrangements home/apartment/condo  -CW     Lives With significant other  -CW     Row Name 11/04/21 1150          Cognition    Affect/Mental Status (Cognitive) WFL  -CW     Orientation Status (Cognition) oriented x 4  -CW     Follows Commands (Cognition) WFL  -CW     Row Name 11/04/21 1150          Pain Scale: Word Pre/Post-Treatment    Pre/Posttreatment Pain Comment Patient reports no pain during physical therapy evaluation this date.  -CW     Row Name 11/04/21 1150          Range of Motion Comprehensive    Comment, General Range of Motion AROM grossly WFl  -CW     Row Name 11/04/21 1150          Strength Comprehensive (MMT)    Comment, General Manual Muscle Testing (MMT) Assessment BLE MMT grossly 4+/5  -CW     Row Name 11/04/21 1150          Bed Mobility    Bed Mobility bed mobility (all) activities  -CW     All Activities, [place] (Bed Mobility) standby assist  -CW     Assistive Device (Bed Mobility) bed rails  -CW     Row Name 11/04/21 1150          Transfers    Transfers sit-stand transfer; stand-sit transfer  -CW     Sit-Stand [place] (Transfers) contact guard  -CW     Stand-Sit [place]  (Transfers) contact guard  -CW     Row Name 11/04/21 1150          Gait/Stairs (Locomotion)    Hyde Level (Gait) contact guard  -CW     Distance in Feet (Gait) 100  -CW     Pattern (Gait) step-through  -CW     Deviations/Abnormal Patterns (Gait) gait speed decreased  -CW     Row Name 11/04/21 1150          Safety Issues, Functional Mobility    Impairments Affecting Function (Mobility) endurance/activity tolerance; shortness of breath; strength  -CW     Row Name 11/04/21 1150          Balance    Balance Assessment sitting static balance; standing static balance  -CW     Static Sitting Balance WFL  -CW     Static Standing Balance WFL  -CW     Row Name 11/04/21 1150          Coping    Observed Emotional State calm; cooperative; pleasant  -CW     Trust Relationship/Rapport care explained; choices provided; questions answered; thoughts/feelings acknowledged  -CW     Row Name 11/04/21 1150          Plan of Care Review    Plan of Care Reviewed With patient  -     Outcome Summary Patient demonstrates functional capacity deficits disparate from her prior level of function related to recent Covid-19 infection and prolonged pneumonia requiring multiple hospitalizations. She is improved from her previous admission, ambulating 100' CGA no AD with saturations observed as low as 88% while on 4L. She is appropriate for discharge home when medically stable.  -     Row Name 11/04/21 1150          Physical Therapy Goals    Gait Training Goal Selection (PT) gait training, PT goal 1  -     Row Name 11/04/21 1150          Gait Training Goal 1 (PT)    Activity/Assistive Device (Gait Training Goal 1, PT) gait (walking locomotion); decrease fall risk; diminish gait deviation; improve balance and speed; increase endurance/gait distance  -CW     Hyde Level (Gait Training Goal 1, PT) independent  -CW     Distance (Gait Training Goal 1, PT) 300  -CW     Time Frame (Gait Training Goal 1, PT) by discharge  -     Row Name  11/04/21 1150          Positioning and Restraints    Pre-Treatment Position in bed  -CW     Post Treatment Position bed  -CW     In Bed notified nsg; sitting EOB; call light within reach; encouraged to call for assist  -CW     Row Name 11/04/21 1150          Therapy Assessment/Plan (PT)    PT Diagnosis (PT) Functional Capacity Deficits  -CW     Rehab Potential (PT) fair, will monitor progress closely  -CW     Criteria for Skilled Interventions Met (PT) yes; meets criteria  -CW     Row Name 11/04/21 1150          Therapy Plan Review/Discharge Plan (PT)    Therapy Plan Review (PT) evaluation/treatment results reviewed; care plan/treatment goals reviewed  -CW           User Key  (r) = Recorded By, (t) = Taken By, (c) = Cosigned By    Initials Name Provider Type    Josue Coley PT Physical Therapist                PT Recommendation and Plan  Anticipated Discharge Disposition (PT): home  Planned Therapy Interventions (PT): balance training, gait training, home exercise program, patient/family education, stair training, strengthening  Therapy Frequency (PT): 3 times/wk (3-5 times/week)  Plan of Care Reviewed With: patient  Outcome Summary: Patient demonstrates functional capacity deficits disparate from her prior level of function related to recent Covid-19 infection and prolonged pneumonia requiring multiple hospitalizations. She is improved from her previous admission, ambulating 100' CGA no AD with saturations observed as low as 88% while on 4L. She is appropriate for discharge home when medically stable.       Time Calculation:    PT Charges     Row Name 11/04/21 120             Time Calculation    PT Received On 11/04/21  -      PT Goal Re-Cert Due Date 11/18/21  -            User Key  (r) = Recorded By, (t) = Taken By, (c) = Cosigned By    Initials Name Provider Type    Josue Coley PT Physical Therapist              Therapy Charges for Today     Code Description Service Date Service Provider  Modifiers Qty    [id number]  PT EVAL MOD COMPLEXITY 4 11/4/2021 Josue Alberto, PT GP 1               Josue Alberto, PT  11/4/2021

## 2021-11-04 NOTE — NURSING NOTE
"Transitional Care Note    Enrolled in [facility] Transitional Care Note    Enrolled in [facility] Transitional Care Services under theTCM Model to be followed for 6 weeks post discharge.  B[facility] will assist with support and education at time of transition home from hospital.  Hospital  will follow throughout the stay at [facility].  Home  will visit within 48 hours of discharge and follow with home vitals and telephone contact for 6 weeks.      Patient admitted to [facility] via Emergency Department, complaints of shortness of breath.  Admitted for further treatment of resp failure.    Patient contacted via phone.      Explained transition to home program and Patient is agreeable to home visits.  Home  will be Janna Guzman LPN    Clinical Assessment Instrument Scores:  >Short Portable Mental Status 10, normal mental function  >Geriatric Depression Scale 5, normal  >IADL 3, Dependent with ADL's  >GRAY-ADL 0, Dependent with self-care  >Subjective Health Rating \"poor\"  >General Anxiety Scale  2    "

## 2021-11-04 NOTE — PLAN OF CARE
Goal Outcome Evaluation:           Progress: improving  Outcome Summary: Pt rested in  bed this shift. Continues to be on 3L NC. No acute changes at this time. VSS. Will continue to monitor.

## 2021-11-04 NOTE — PLAN OF CARE
Goal Outcome Evaluation:  Plan of Care Reviewed With: patient           Outcome Summary: Patient demonstrates functional capacity deficits disparate from her prior level of function related to recent Covid-19 infection and prolonged pneumonia requiring multiple hospitalizations. She is improved from her previous admission, ambulating 100' CGA no AD with saturations observed as low as 88% while on 4L. She is appropriate for discharge home when medically stable.

## 2021-11-04 NOTE — PROGRESS NOTES
PROGRESS NOTE         Patient Identification:  Name:  Jessica Bravo  Age:  56 y.o.  Sex:  female  :  1965  MRN:  [id number]  Visit Number:  [id number]  Primary Care Provider:  Buck Wallis APRN         LOS: 5 days       ----------------------------------------------------------------------------------------------------------------------  Subjective       Chief Complaints:    Shortness of Breath and Cough        Interval History:      Patient remains on 3 L nasal cannula this morning in no apparent distress.  Status post bronchoscopy on 2021. No new complaints today. Afebrile, no diarrhea. BAL culture on 2021 finalized as normal bubba.  CRP is improved at 2.59.  WBC remains normal.    Review of Systems:    Constitutional: no fever, chills and night sweats. No appetite change or unexpected weight change. No fatigue.  Eyes: no eye drainage, itching or redness.  HEENT: no mouth sores, dysphagia or nose bleed.  Respiratory:  Shortness of breath and productive cough.  Cardiovascular: no chest pain, no palpitations, no orthopnea.  Gastrointestinal: no nausea, vomiting or diarrhea. No abdominal pain, hematemesis or rectal bleeding.  Genitourinary: no dysuria or polyuria.  Hematologic/lymphatic: no lymph node abnormalities, no easy bruising or easy bleeding.  Musculoskeletal: no muscle or joint pain.  Skin: No rash and no itching.  Neurological: no loss of consciousness, no seizure, no headache.  Behavioral/Psych: no depression or suicidal ideation.  Endocrine: no hot flashes.  Immunologic: negative.    ----------------------------------------------------------------------------------------------------------------------      Objective       [facility] [place] Meds:  amLODIPine, 5 mg, Oral, Q24H  apixaban, 5 mg, Oral, Q12H  aspirin, 81 mg, Oral, Daily  atorvastatin, 40 mg, Oral, Daily  cefepime, 2 g, Intravenous, Q24H  cholecalciferol, 50,000 Units, Oral, Weekly  fluticasone, 1 spray, Nasal,  BID  hydrOXYzine, 25 mg, Oral, Nightly  insulin aspart, 0-14 Units, Subcutaneous, 4x Daily PC & at Bedtime  insulin aspart, 5 Units, Subcutaneous, TID With Meals  insulin detemir, 20 Units, Subcutaneous, Nightly  linaclotide, 145 mcg, Oral, QAM AC  methylPREDNISolone sodium succinate, 125 mg, Intravenous, Q12H  montelukast, 10 mg, Oral, Nightly  pantoprazole, 40 mg, Oral, Daily  sodium bicarbonate, 650 mg, Oral, BID  sodium chloride, 10 mL, Intravenous, Q12H  sodium chloride, 10 mL, Intravenous, Q12H  sodium chloride, 10 mL, Intravenous, Q12H  vancomycin, 1,000 mg, Intravenous, Once  Vancomycin Pharmacy Intermittent Dosing, , Does not apply, Daily         ----------------------------------------------------------------------------------------------------------------------    Vital Signs:  Temp:  [97.7 °F (36.5 °C)-98.4 °F (36.9 °C)] 98.1 °F (36.7 °C)  Heart Rate:  [75-95] 75  Resp:  [12-26] 18  BP: (136-186)/(70-89) 145/74  Mean Arterial Pressure (Non-Invasive) for the past 24 hrs (Last 3 readings):   Noninvasive MAP (mmHg)   11/03/21 1424 115   11/03/21 1141 114   11/03/21 1102 110     SpO2 Percentage    11/03/21 2300 11/04/21 0300 11/04/21 0628   SpO2: 96% 96% 90%  Comment: 2248     SpO2:  [90 %-96 %] 90 %  on  Flow (L/min):  [3] 3;   Device (Oxygen Therapy): nasal cannula    Body mass index is 27.93 kg/m².  Wt Readings from Last 3 Encounters:   11/03/21 73.8 kg (162 lb 12.8 oz)   10/10/21 72.6 kg (160 lb)   02/05/20 78 kg (172 lb)        Intake/Output Summary (Last 24 hours) at 11/4/2021 0956  Last data filed at 11/4/2021 0900  Gross per 24 hour   Intake 1278.55 ml   Output --   Net 1278.55 ml     Diet Regular; Consistent Carbohydrate  ----------------------------------------------------------------------------------------------------------------------      Physical Exam:      Constitutional: Chronically ill-appearing.  No respiratory distress.      HENT:  Head: Normocephalic and atraumatic.  Mouth:  Moist mucous  membranes.    Eyes:  Conjunctivae and EOM are normal.  No scleral icterus.  Neck:  Neck supple.  No JVD present.    Cardiovascular:  Normal rate, regular rhythm and normal heart sounds with no murmur. No edema.  Pulmonary/Chest: Diminished lung sounds bilaterally.  Abdominal:  Soft.  Bowel sounds are normal.  No distension and no tenderness.   Musculoskeletal:  No edema, no tenderness, and no deformity.  No swelling or redness of joints.  Neurological:  Alert and oriented to person, place, and time.  No facial droop.  No slurred speech.   Skin:  Skin is warm and dry.  No rash noted.  No pallor.   Psychiatric:  Normal mood and affect.  Behavior is normal.       ----------------------------------------------------------------------------------------------------------------------  Results from last 7 days   Lab Units 11/01/21  0124 10/30/21  1513   CK TOTAL U/L 31  --    TROPONIN T ng/mL  --  <0.010     Results from last 7 days   Lab Units 10/30/21  1513   PROBNP pg/mL 714.7       Results from last 7 days   Lab Units 10/30/21  1356   PH, ARTERIAL pH units 7.359   PO2 ART mm Hg 46.6*   PCO2, ARTERIAL mm Hg 37.5   HCO3 ART mmol/L 21.1     Results from last 7 days   Lab Units 11/04/21  0409 11/03/21  0648 11/02/21  0433 11/01/21  0124 10/31/21  0514 10/30/21  1833 10/30/21  1513 10/30/21  1513   CRP mg/dL 2.59* 3.44* 5.82*   < > 4.02*  --    < > 3.39*   LACTATE mmol/L  --   --   --   --  0.8 2.4*  --  2.1*   WBC 10*3/mm3 9.85 7.01 7.26   < > 9.03  --    < > 8.48   HEMOGLOBIN g/dL 9.4* 9.1* 9.3*   < > 9.7*  --    < > 10.4*   HEMATOCRIT % 30.6* 30.1* 30.6*   < > 31.6*  --    < > 34.6   MCV fL 86.7 89.3 88.2   < > 89.5  --    < > 90.3   MCHC g/dL 30.7* 30.2* 30.4*   < > 30.7*  --    < > 30.1*   PLATELETS 10*3/mm3 340 278 274   < > 237  --    < > 245   INR   --   --   --   --   --   --   --  1.12*    < > = values in this interval not displayed.     Results from last 7 days   Lab Units 11/04/21  0409 11/03/21  0648  11/02/21  0942 11/02/21  0433 11/02/21  0433 10/31/21  0514 10/30/21  1513   SODIUM mmol/L 136 134* 143   < > 134*   < > 135*   POTASSIUM mmol/L 4.9 4.9 4.2   < > 5.8*   < > 5.0   MAGNESIUM mg/dL  --   --   --   --   --   --  2.1   CHLORIDE mmol/L 100 101 108*   < > 106   < > 102   CO2 mmol/L 24.6 22.9 23.6   < > 19.3*   < > 20.2*   BUN mg/dL 45* 35* 36*   < > 37*   < > 32*   CREATININE mg/dL 2.95* 2.99* 2.94*   < > 3.09*   < > 3.28*   EGFR IF NONAFRICN AM mL/min/1.73 16* 16* 16*   < > 15*   < > 14*   CALCIUM mg/dL 8.5* 7.6* 9.4   < > 8.3*   < > 8.1*   GLUCOSE mg/dL 341* 386* 63*   < > 474*   < > 468*   ALBUMIN g/dL 3.30* 2.78*  --   --  2.71*   < > 3.16*   BILIRUBIN mg/dL 0.2 0.3  --   --  0.2   < > 0.3   ALK PHOS U/L 110 94  --   --  88   < > 104   AST (SGOT) U/L 8 9  --   --  9   < > 7   ALT (SGPT) U/L 8 5  --   --  5   < > 9    < > = values in this interval not displayed.   Estimated Creatinine Clearance: 20.4 mL/min (A) (by C-G formula based on SCr of 2.95 mg/dL (H)).  No results found for: AMMONIA    Glucose   Date/Time Value Ref Range Status   11/04/2021 0626 311 (H) 70 - 130 mg/dL Final     Comment:     Meter: [id number] : [id number] MEKHI WHITE   11/04/2021 0203 361 (H) 70 - 130 mg/dL Final     Comment:     Meter: [id number] : [id number] MYRON MATT   11/03/2021 2350 464 (C) 70 - 130 mg/dL Final     Comment:     RN Notified Meter: [id number] : [id number] MYRON MATT   11/03/2021 2213 523 (C) 70 - 130 mg/dL Final     Comment:     RN Notified Meter: [id number] : [id number] MYRON MATT   11/03/2021 2033 544 (C) 70 - 130 mg/dL Final     Comment:     Confirmed by Repeat Meter: [id number] : [id number] MYRON MATT   11/03/2021 1701 329 (H) 70 - 130 mg/dL Final     Comment:     Meter: [id number] : [id number] DANIELLE VELÁSQUEZ   11/03/2021 1045 410 (C) 70 - 130 mg/dL Final     Comment:     Treated Patient Meter: [id number] : [id number] PARESH HENRY   11/03/2021 0557 375 (H) 70 - 130  mg/dL Final     Comment:     Meter: [id number] : [id number] Rachel Lopez     Lab Results   Component Value Date    HGBA1C 10.50 (H) 10/09/2021     Lab Results   Component Value Date    TSH 0.397 10/09/2021       Blood Culture   Date Value Ref Range Status   10/30/2021 No growth at 24 hours  Preliminary   10/30/2021 No growth at 24 hours  Preliminary     No results found for: URINECX  No results found for: WOUNDCX  No results found for: STOOLCX  No results found for: RESPCX  Pain Management Panel       Pain Management Panel Latest Ref Rng & Units 10/10/2021    CREATININE UR mg/dL 41.4              ----------------------------------------------------------------------------------------------------------------------  Imaging Results (Last 24 Hours)       ** No results found for the last 24 hours. **            ----------------------------------------------------------------------------------------------------------------------    Assessment/Plan       Assessment/Plan     ASSESSMENT:    1.  Severe sepsis with lactic acid greater than 2 on admission  2.  Bilateral pneumonia    PLAN:    Patient remains on 3 L nasal cannula this morning in no apparent distress.  Status post bronchoscopy on 11/2/2021. No new complaints today. Afebrile, no diarrhea. BAL culture on 11/2/2021 finalized as normal bubba.  CRP is improved at 2.59.  WBC remains normal.    Patient was recently hospitalized at our facility from 10/9/2021 through 10/15/2021 for sepsis secondary to COVID-19 pneumonia and was treated with a regimen of Levaquin, Flagyl, micafungin after concern for superimposed bacterial pneumonia and underlying UTI.  Levaquin and Flagyl p.o. course were to be finished on 10/20/2021.    Respiratory panel PCR negative.  MRSA PCR negative.  Legionella negative.  VQ scan from 10/30/2021 reports low probability of PE.  Chest x-ray from 10/30/2021 reports significant interval worsening of bilateral pneumonia.  CT of the chest from  10/30/2021 reports continued extensive bilateral parenchymal airspace disease with progressive consolidation in multiple areas of groundglass opacity particularly in the lower lung zones.     For now recommend to continue vancomycin pharmacy to dose and cefepime pharmacy to dose empirically.  We will continue to follow closely and adjust antibiotic therapy as needed.    Code Status:   Code Status and Medical Interventions:   Ordered at: 10/30/21 3566     Code Status (Patient has no pulse and is not breathing):    CPR (Attempt to Resuscitate)     Medical Interventions (Patient has pulse or is breathing):    Full       JOHN Holm  11/04/21  09:56 EDT

## 2021-11-04 NOTE — PLAN OF CARE
Goal Outcome Evaluation:  Plan of Care Reviewed With: patient        Progress: improving  Outcome Summary: Pt resting in bed. has been ambulating up to bedside throughout shift. pt remains on 3L. no acute issues noted at this time. will continue with plan of care.

## 2021-11-04 NOTE — PROGRESS NOTES
Pharmacokinetics Service Note:    Ms. Bravo continues on day 6 of 7 of vancomycin for her B pneumonia post Covid-19.  Her serum Cr is unchanged today at 2.95 mg/dL with an estimated ClCr ~ 20 mL/min.  A random level was reported as 18.6 mg/L today (34.25 hours post infusion).  Will redose with 1 gm at noon to maintain adequate levels.  No further levels or redosing should be needed unless the treatment course is extended beyone 11/5.      Thank you.  Leonie Bradford, Pharm.D.  11/4/2021  08:58 EDT

## 2021-11-05 ENCOUNTER — APPOINTMENT (OUTPATIENT)
Dept: GENERAL RADIOLOGY | Facility: HOSPITAL | Age: 56
End: 2021-11-05

## 2021-11-05 LAB
ALBUMIN SERPL-MCNC: 2.93 G/DL (ref 3.5–5.2)
ALBUMIN/GLOB SERPL: 0.9 G/DL
ALP SERPL-CCNC: 80 U/L (ref 39–117)
ALT SERPL W P-5'-P-CCNC: 10 U/L (ref 1–33)
ANION GAP SERPL CALCULATED.3IONS-SCNC: 12.7 MMOL/L (ref 5–15)
AST SERPL-CCNC: 11 U/L (ref 1–32)
BILIRUB SERPL-MCNC: 0.2 MG/DL (ref 0–1.2)
BUN SERPL-MCNC: 51 MG/DL (ref 6–20)
BUN/CREAT SERPL: 17.8 (ref 7–25)
CALCIUM SPEC-SCNC: 8.3 MG/DL (ref 8.6–10.5)
CHLORIDE SERPL-SCNC: 102 MMOL/L (ref 98–107)
CO2 SERPL-SCNC: 20.3 MMOL/L (ref 22–29)
CREAT SERPL-MCNC: 2.86 MG/DL (ref 0.57–1)
CRP SERPL-MCNC: 1.37 MG/DL (ref 0–0.5)
DEPRECATED RDW RBC AUTO: 50.8 FL (ref 37–54)
ERYTHROCYTE [DISTWIDTH] IN BLOOD BY AUTOMATED COUNT: 15.8 % (ref 12.3–15.4)
GFR SERPL CREATININE-BSD FRML MDRD: 17 ML/MIN/1.73
GLOBULIN UR ELPH-MCNC: 3.3 GM/DL
GLUCOSE BLDC GLUCOMTR-MCNC: 300 MG/DL (ref 70–130)
GLUCOSE BLDC GLUCOMTR-MCNC: 311 MG/DL (ref 70–130)
GLUCOSE BLDC GLUCOMTR-MCNC: 314 MG/DL (ref 70–130)
GLUCOSE BLDC GLUCOMTR-MCNC: 379 MG/DL (ref 70–130)
GLUCOSE BLDC GLUCOMTR-MCNC: 419 MG/DL (ref 70–130)
GLUCOSE SERPL-MCNC: 299 MG/DL (ref 65–99)
HCT VFR BLD AUTO: 29.6 % (ref 34–46.6)
HGB BLD-MCNC: 9.3 G/DL (ref 12–15.9)
MCH RBC QN AUTO: 27.5 PG (ref 26.6–33)
MCHC RBC AUTO-ENTMCNC: 31.4 G/DL (ref 31.5–35.7)
MCV RBC AUTO: 87.6 FL (ref 79–97)
P JIROVECII AG SPEC QL IF: NEGATIVE
PLATELET # BLD AUTO: 310 10*3/MM3 (ref 140–450)
PMV BLD AUTO: 9.6 FL (ref 6–12)
POTASSIUM SERPL-SCNC: 4.9 MMOL/L (ref 3.5–5.2)
PROT SERPL-MCNC: 6.2 G/DL (ref 6–8.5)
RBC # BLD AUTO: 3.38 10*6/MM3 (ref 3.77–5.28)
SODIUM SERPL-SCNC: 135 MMOL/L (ref 136–145)
WBC # BLD AUTO: 10.16 10*3/MM3 (ref 3.4–10.8)

## 2021-11-05 PROCEDURE — 63710000001 INSULIN REGULAR HUMAN PER 5 UNITS: Performed by: STUDENT IN AN ORGANIZED HEALTH CARE EDUCATION/TRAINING PROGRAM

## 2021-11-05 PROCEDURE — 63710000001 INSULIN ASPART PER 5 UNITS: Performed by: PHYSICIAN ASSISTANT

## 2021-11-05 PROCEDURE — 82962 GLUCOSE BLOOD TEST: CPT

## 2021-11-05 PROCEDURE — 85027 COMPLETE CBC AUTOMATED: CPT | Performed by: STUDENT IN AN ORGANIZED HEALTH CARE EDUCATION/TRAINING PROGRAM

## 2021-11-05 PROCEDURE — 80053 COMPREHEN METABOLIC PANEL: CPT | Performed by: STUDENT IN AN ORGANIZED HEALTH CARE EDUCATION/TRAINING PROGRAM

## 2021-11-05 PROCEDURE — 63710000001 INSULIN ASPART PER 5 UNITS: Performed by: STUDENT IN AN ORGANIZED HEALTH CARE EDUCATION/TRAINING PROGRAM

## 2021-11-05 PROCEDURE — 63710000001 INSULIN DETEMIR PER 5 UNITS: Performed by: STUDENT IN AN ORGANIZED HEALTH CARE EDUCATION/TRAINING PROGRAM

## 2021-11-05 PROCEDURE — 99232 SBSQ HOSP IP/OBS MODERATE 35: CPT | Performed by: STUDENT IN AN ORGANIZED HEALTH CARE EDUCATION/TRAINING PROGRAM

## 2021-11-05 PROCEDURE — 25010000002 METHYLPREDNISOLONE PER 40 MG: Performed by: INTERNAL MEDICINE

## 2021-11-05 PROCEDURE — 71046 X-RAY EXAM CHEST 2 VIEWS: CPT | Performed by: RADIOLOGY

## 2021-11-05 PROCEDURE — 94799 UNLISTED PULMONARY SVC/PX: CPT

## 2021-11-05 PROCEDURE — 99232 SBSQ HOSP IP/OBS MODERATE 35: CPT | Performed by: INTERNAL MEDICINE

## 2021-11-05 PROCEDURE — 71046 X-RAY EXAM CHEST 2 VIEWS: CPT

## 2021-11-05 PROCEDURE — 86140 C-REACTIVE PROTEIN: CPT | Performed by: STUDENT IN AN ORGANIZED HEALTH CARE EDUCATION/TRAINING PROGRAM

## 2021-11-05 PROCEDURE — 25010000002 CEFEPIME PER 500 MG: Performed by: STUDENT IN AN ORGANIZED HEALTH CARE EDUCATION/TRAINING PROGRAM

## 2021-11-05 RX ORDER — METHYLPREDNISOLONE SODIUM SUCCINATE 40 MG/ML
40 INJECTION, POWDER, LYOPHILIZED, FOR SOLUTION INTRAMUSCULAR; INTRAVENOUS EVERY 8 HOURS
Status: DISCONTINUED | OUTPATIENT
Start: 2021-11-05 | End: 2021-11-06

## 2021-11-05 RX ADMIN — AMLODIPINE BESYLATE 5 MG: 5 TABLET ORAL at 08:40

## 2021-11-05 RX ADMIN — SODIUM CHLORIDE, PRESERVATIVE FREE 10 ML: 5 INJECTION INTRAVENOUS at 08:40

## 2021-11-05 RX ADMIN — MONTELUKAST SODIUM 10 MG: 10 TABLET, COATED ORAL at 20:09

## 2021-11-05 RX ADMIN — HYDROXYZINE HYDROCHLORIDE 25 MG: 25 TABLET ORAL at 20:09

## 2021-11-05 RX ADMIN — SODIUM BICARBONATE TAB 650 MG 650 MG: 650 TAB at 20:09

## 2021-11-05 RX ADMIN — METHYLPREDNISOLONE SODIUM SUCCINATE 40 MG: 40 INJECTION, POWDER, FOR SOLUTION INTRAMUSCULAR; INTRAVENOUS at 17:22

## 2021-11-05 RX ADMIN — SODIUM CHLORIDE, PRESERVATIVE FREE 10 ML: 5 INJECTION INTRAVENOUS at 20:09

## 2021-11-05 RX ADMIN — CEFEPIME HYDROCHLORIDE 2 G: 2 INJECTION, POWDER, FOR SOLUTION INTRAVENOUS at 17:22

## 2021-11-05 RX ADMIN — INSULIN ASPART 10 UNITS: 100 INJECTION, SOLUTION INTRAVENOUS; SUBCUTANEOUS at 11:13

## 2021-11-05 RX ADMIN — INSULIN ASPART 10 UNITS: 100 INJECTION, SOLUTION INTRAVENOUS; SUBCUTANEOUS at 17:21

## 2021-11-05 RX ADMIN — INSULIN ASPART 10 UNITS: 100 INJECTION, SOLUTION INTRAVENOUS; SUBCUTANEOUS at 20:09

## 2021-11-05 RX ADMIN — INSULIN ASPART 10 UNITS: 100 INJECTION, SOLUTION INTRAVENOUS; SUBCUTANEOUS at 08:40

## 2021-11-05 RX ADMIN — INSULIN ASPART 12 UNITS: 100 INJECTION, SOLUTION INTRAVENOUS; SUBCUTANEOUS at 17:21

## 2021-11-05 RX ADMIN — INSULIN ASPART 10 UNITS: 100 INJECTION, SOLUTION INTRAVENOUS; SUBCUTANEOUS at 08:39

## 2021-11-05 RX ADMIN — INSULIN ASPART 14 UNITS: 100 INJECTION, SOLUTION INTRAVENOUS; SUBCUTANEOUS at 11:14

## 2021-11-05 RX ADMIN — SODIUM BICARBONATE TAB 650 MG 650 MG: 650 TAB at 08:40

## 2021-11-05 RX ADMIN — ATORVASTATIN CALCIUM 40 MG: 40 TABLET, FILM COATED ORAL at 08:40

## 2021-11-05 RX ADMIN — SODIUM CHLORIDE, PRESERVATIVE FREE 10 ML: 5 INJECTION INTRAVENOUS at 20:10

## 2021-11-05 RX ADMIN — LINACLOTIDE 145 MCG: 145 CAPSULE, GELATIN COATED ORAL at 08:40

## 2021-11-05 RX ADMIN — SODIUM CHLORIDE, PRESERVATIVE FREE 10 ML: 5 INJECTION INTRAVENOUS at 08:41

## 2021-11-05 RX ADMIN — APIXABAN 5 MG: 5 TABLET, FILM COATED ORAL at 20:09

## 2021-11-05 RX ADMIN — PANTOPRAZOLE SODIUM 40 MG: 40 TABLET, DELAYED RELEASE ORAL at 08:40

## 2021-11-05 RX ADMIN — INSULIN DETEMIR 40 UNITS: 100 INJECTION, SOLUTION SUBCUTANEOUS at 20:09

## 2021-11-05 RX ADMIN — FLUTICASONE PROPIONATE 1 SPRAY: 50 SPRAY, METERED NASAL at 20:10

## 2021-11-05 RX ADMIN — HUMAN INSULIN 5 UNITS: 100 INJECTION, SOLUTION SUBCUTANEOUS at 11:14

## 2021-11-05 RX ADMIN — APIXABAN 5 MG: 5 TABLET, FILM COATED ORAL at 08:40

## 2021-11-05 RX ADMIN — HYDROCODONE BITARTRATE AND ACETAMINOPHEN 10 ML: 7.5; 325 SOLUTION ORAL at 20:15

## 2021-11-05 RX ADMIN — METHYLPREDNISOLONE SODIUM SUCCINATE 40 MG: 40 INJECTION, POWDER, FOR SOLUTION INTRAMUSCULAR; INTRAVENOUS at 09:51

## 2021-11-05 RX ADMIN — FLUTICASONE PROPIONATE 1 SPRAY: 50 SPRAY, METERED NASAL at 08:40

## 2021-11-05 RX ADMIN — ASPIRIN 81 MG: 81 TABLET, COATED ORAL at 08:40

## 2021-11-05 NOTE — PROGRESS NOTES
PROGRESS NOTE         Patient Identification:  Name:  Jessica Bravo  Age:  56 y.o.  Sex:  female  :  1965  MRN:  [id number]  Visit Number:  [id number]  Primary Care Provider:  Buck Wallis APRN         LOS: 6 days       ----------------------------------------------------------------------------------------------------------------------  Subjective       Chief Complaints:    Shortness of Breath and Cough        Interval History:      Patient remains on 3 L nasal cannula this morning in no apparent distress.  Status post bronchoscopy on 2021. No new complaints today.  Patient reports that she is feeling very well this morning.  Low-grade fever of 99.3 °F. No diarrhea. CRP is improved at 1.37.  WBC remains normal.    Review of Systems:    Constitutional: no fever, chills and night sweats. No appetite change or unexpected weight change. No fatigue.  Eyes: no eye drainage, itching or redness.  HEENT: no mouth sores, dysphagia or nose bleed.  Respiratory:  No shortness of breath, cough, or production of sputum.  Cardiovascular: no chest pain, no palpitations, no orthopnea.  Gastrointestinal: no nausea, vomiting or diarrhea. No abdominal pain, hematemesis or rectal bleeding.  Genitourinary: no dysuria or polyuria.  Hematologic/lymphatic: no lymph node abnormalities, no easy bruising or easy bleeding.  Musculoskeletal: no muscle or joint pain.  Skin: No rash and no itching.  Neurological: no loss of consciousness, no seizure, no headache.  Behavioral/Psych: no depression or suicidal ideation.  Endocrine: no hot flashes.  Immunologic: negative.    ----------------------------------------------------------------------------------------------------------------------      Objective       [facility] Meds:  amLODIPine, 5 mg, Oral, Q24H  apixaban, 5 mg, Oral, Q12H  aspirin, 81 mg, Oral, Daily  atorvastatin, 40 mg, Oral, Daily  cefepime, 2 g, Intravenous, Q24H  cholecalciferol, 50,000 Units, Oral,  Weekly  fluticasone, 1 spray, Nasal, BID  hydrOXYzine, 25 mg, Oral, Nightly  insulin aspart, 0-14 Units, Subcutaneous, 4x Daily PC & at Bedtime  insulin aspart, 10 Units, Subcutaneous, TID With Meals  insulin detemir, 30 Units, Subcutaneous, Nightly  linaclotide, 145 mcg, Oral, QAM AC  methylPREDNISolone sodium succinate, 40 mg, Intravenous, Q8H  montelukast, 10 mg, Oral, Nightly  pantoprazole, 40 mg, Oral, Daily  sodium bicarbonate, 650 mg, Oral, BID  sodium chloride, 10 mL, Intravenous, Q12H  sodium chloride, 10 mL, Intravenous, Q12H  sodium chloride, 10 mL, Intravenous, Q12H  Vancomycin Pharmacy Intermittent Dosing, , Does not apply, Daily         ----------------------------------------------------------------------------------------------------------------------    Vital Signs:  Temp:  [98.1 °F (36.7 °C)-99.3 °F (37.4 °C)] 99.3 °F (37.4 °C)  Heart Rate:  [74-82] 78  Resp:  [16-20] 20  BP: (134-152)/(55-79) 134/55  Mean Arterial Pressure (Non-Invasive) for the past 24 hrs (Last 3 readings):   Noninvasive MAP (mmHg)   11/05/21 0600 92     SpO2 Percentage    11/04/21 1900 11/05/21 0600 11/05/21 0859   SpO2: 91% 96%  Comment: 2248 94%     SpO2:  [91 %-96 %] 94 %  on  Flow (L/min):  [3] 3;   Device (Oxygen Therapy): nasal cannula    Body mass index is 27.93 kg/m².  Wt Readings from Last 3 Encounters:   11/03/21 73.8 kg (162 lb 12.8 oz)   10/10/21 72.6 kg (160 lb)   02/05/20 78 kg (172 lb)        Intake/Output Summary (Last 24 hours) at 11/5/2021 0929  Last data filed at 11/5/2021 0300  Gross per 24 hour   Intake 2529.88 ml   Output --   Net 2529.88 ml     Diet Regular; Consistent Carbohydrate  ----------------------------------------------------------------------------------------------------------------------      Physical Exam:      Constitutional:  Well-developed and well-nourished  female is sitting up in bed.  No respiratory distress.      HENT:  Head: Normocephalic and atraumatic.  Mouth:  Moist  mucous membranes.    Eyes:  Conjunctivae and EOM are normal.  No scleral icterus.  Neck:  Neck supple.  No JVD present.    Cardiovascular:  Normal rate, regular rhythm and normal heart sounds with no murmur. No edema.  Pulmonary/Chest:  No respiratory distress, no wheezes, and no crackles.  Normal breath sounds throughout but diminished bilaterally.  Abdominal:  Soft.  Bowel sounds are normal.  No distension and no tenderness.   Musculoskeletal:  No edema, no tenderness, and no deformity.  No swelling or redness of joints.  Neurological:  Alert and oriented to person, place, and time.  No facial droop.  No slurred speech.   Skin:  Skin is warm and dry.  No rash noted.  No pallor.   Psychiatric:  Normal mood and affect.  Behavior is normal.       ----------------------------------------------------------------------------------------------------------------------  Results from last 7 days   Lab Units 11/01/21  0124 10/30/21  1513   CK TOTAL U/L 31  --    TROPONIN T ng/mL  --  <0.010     Results from last 7 days   Lab Units 10/30/21  1513   PROBNP pg/mL 714.7       Results from last 7 days   Lab Units 10/30/21  1356   PH, ARTERIAL pH units 7.359   PO2 ART mm Hg 46.6*   PCO2, ARTERIAL mm Hg 37.5   HCO3 ART mmol/L 21.1     Results from last 7 days   Lab Units 11/05/21  0552 11/04/21  0409 11/03/21  0648 11/01/21  0124 10/31/21  0514 10/30/21  1833 10/30/21  1513 10/30/21  1513   CRP mg/dL 1.37* 2.59* 3.44*   < > 4.02*  --    < > 3.39*   LACTATE mmol/L  --   --   --   --  0.8 2.4*  --  2.1*   WBC 10*3/mm3 10.16 9.85 7.01   < > 9.03  --    < > 8.48   HEMOGLOBIN g/dL 9.3* 9.4* 9.1*   < > 9.7*  --    < > 10.4*   HEMATOCRIT % 29.6* 30.6* 30.1*   < > 31.6*  --    < > 34.6   MCV fL 87.6 86.7 89.3   < > 89.5  --    < > 90.3   MCHC g/dL 31.4* 30.7* 30.2*   < > 30.7*  --    < > 30.1*   PLATELETS 10*3/mm3 310 340 278   < > 237  --    < > 245   INR   --   --   --   --   --   --   --  1.12*    < > = values in this interval not  displayed.     Results from last 7 days   Lab Units 11/05/21  0552 11/04/21  0409 11/03/21  0648 10/31/21  0514 10/30/21  1513   SODIUM mmol/L 135* 136 134*   < > 135*   POTASSIUM mmol/L 4.9 4.9 4.9   < > 5.0   MAGNESIUM mg/dL  --   --   --   --  2.1   CHLORIDE mmol/L 102 100 101   < > 102   CO2 mmol/L 20.3* 24.6 22.9   < > 20.2*   BUN mg/dL 51* 45* 35*   < > 32*   CREATININE mg/dL 2.86* 2.95* 2.99*   < > 3.28*   EGFR IF NONAFRICN AM mL/min/1.73 17* 16* 16*   < > 14*   CALCIUM mg/dL 8.3* 8.5* 7.6*   < > 8.1*   GLUCOSE mg/dL 299* 341* 386*   < > 468*   ALBUMIN g/dL 2.93* 3.30* 2.78*   < > 3.16*   BILIRUBIN mg/dL 0.2 0.2 0.3   < > 0.3   ALK PHOS U/L 80 110 94   < > 104   AST (SGOT) U/L 11 8 9   < > 7   ALT (SGPT) U/L 10 8 5   < > 9    < > = values in this interval not displayed.   Estimated Creatinine Clearance: 21.1 mL/min (A) (by C-G formula based on SCr of 2.86 mg/dL (H)).  No results found for: AMMONIA    Glucose   Date/Time Value Ref Range Status   11/05/2021 0642 314 (H) 70 - 130 mg/dL Final     Comment:     Meter: [id number] : [id number] ANTONETTE DAVIES   11/04/2021 2314 275 (H) 70 - 130 mg/dL Final     Comment:     Meter: [id number] : [id number] MYRON MATT   11/04/2021 2042 406 (C) 70 - 130 mg/dL Final     Comment:     RN Notified Meter: [id number] : [id number] MYRON MATT   11/04/2021 1655 356 (H) 70 - 130 mg/dL Final     Comment:     Meter: [id number] : [id number] Brittany Collett   11/04/2021 1059 328 (H) 70 - 130 mg/dL Final     Comment:     Meter: [id number] : [id number] Brittany Collett   11/04/2021 0626 311 (H) 70 - 130 mg/dL Final     Comment:     Meter: [id number] : [id number] MEKHI WHITE   11/04/2021 0203 361 (H) 70 - 130 mg/dL Final     Comment:     Meter: [id number] : [id number] MYRON MATT   11/03/2021 2350 464 (C) 70 - 130 mg/dL Final     Comment:     RN Notified Meter: [id number] : [id number] MYRON MATT     Lab Results   Component Value Date    HGBA1C 10.50  (H) 10/09/2021     Lab Results   Component Value Date    TSH 0.397 10/09/2021       Blood Culture   Date Value Ref Range Status   10/30/2021 No growth at 24 hours  Preliminary   10/30/2021 No growth at 24 hours  Preliminary     No results found for: URINECX  No results found for: WOUNDCX  No results found for: STOOLCX  No results found for: RESPCX  Pain Management Panel       Pain Management Panel Latest Ref Rng & Units 10/10/2021    CREATININE UR mg/dL 41.4              ----------------------------------------------------------------------------------------------------------------------  Imaging Results (Last 24 Hours)       Procedure Component Value Units Date/Time    XR Chest PA & Lateral [[id number]] Resulted: 11/05/21 0924     Updated: 11/05/21 0926            ----------------------------------------------------------------------------------------------------------------------    Assessment/Plan       Assessment/Plan     ASSESSMENT:    1.  Severe sepsis with lactic acid greater than 2 on admission  2.  Bilateral pneumonia    PLAN:    Patient remains on 3 L nasal cannula this morning in no apparent distress.  Status post bronchoscopy on 11/2/2021. No new complaints today.  Patient reports that she is feeling very well this morning.  Low-grade fever of 99.3 °F. No diarrhea. CRP is improved at 1.37.  WBC remains normal.    Patient was recently hospitalized at our facility from 10/9/2021 through 10/15/2021 for sepsis secondary to COVID-19 pneumonia and was treated with a regimen of Levaquin, Flagyl, micafungin after concern for superimposed bacterial pneumonia and underlying UTI.  Levaquin and Flagyl p.o. course were to be finished on 10/20/2021.    Respiratory panel PCR negative.  MRSA PCR negative.  Legionella negative.  VQ scan from 10/30/2021 reports low probability of PE.  Chest x-ray from 10/30/2021 reports significant interval worsening of bilateral pneumonia.  CT of the chest from 10/30/2021 reports  continued extensive bilateral parenchymal airspace disease with progressive consolidation in multiple areas of groundglass opacity particularly in the lower lung zones.     Patient is completing empiric course of vancomycin and cefepime. We will continue to follow closely off antibiotic coverage.    Code Status:   Code Status and Medical Interventions:   Ordered at: 10/30/21 1635     Code Status (Patient has no pulse and is not breathing):    CPR (Attempt to Resuscitate)     Medical Interventions (Patient has pulse or is breathing):    Full       JOHN Holm  11/05/21  09:29 EDT

## 2021-11-05 NOTE — PROGRESS NOTES
[facility] HOSPITALIST PROGRESS NOTE     Patient Identification:  Name:  Jessica Bravo  Age:  56 y.o.  Sex:  female  :  1965  MRN:  [id number]  Visit Number:  [id number]  ROOM: [address] [place] [address]     Primary Care Provider:  Buck Wallis APRN    Length of stay in inpatient status:  6    Subjective     Chief Compliant:    Chief Complaint   Patient presents with   • Shortness of Breath   • Cough       History of Presenting Illness:    Patient seen in follow-up for acute hypoxic respiratory failure. Says she feels a little bit better today but still on 3L Nc at rest. Hemodynamically stable, 93% on 3L. No significant sputum production. No adverse events noted by nursing.    Objective     Current Hospital Meds:amLODIPine, 5 mg, Oral, Q24H  apixaban, 5 mg, Oral, Q12H  aspirin, 81 mg, Oral, Daily  atorvastatin, 40 mg, Oral, Daily  cefepime, 2 g, Intravenous, Q24H  cholecalciferol, 50,000 Units, Oral, Weekly  fluticasone, 1 spray, Nasal, BID  hydrOXYzine, 25 mg, Oral, Nightly  insulin aspart, 0-14 Units, Subcutaneous, 4x Daily PC & at Bedtime  insulin aspart, 10 Units, Subcutaneous, TID With Meals  insulin detemir, 30 Units, Subcutaneous, Nightly  linaclotide, 145 mcg, Oral, QAM AC  methylPREDNISolone sodium succinate, 40 mg, Intravenous, Q8H  montelukast, 10 mg, Oral, Nightly  pantoprazole, 40 mg, Oral, Daily  sodium bicarbonate, 650 mg, Oral, BID  sodium chloride, 10 mL, Intravenous, Q12H  sodium chloride, 10 mL, Intravenous, Q12H  sodium chloride, 10 mL, Intravenous, Q12H  Vancomycin Pharmacy Intermittent Dosing, , Does not apply, Daily         Current Antimicrobial Therapy:  Anti-Infectives (From admission, onward)    Ordered     Dose/Rate Route Frequency Start Stop    21 0855  vancomycin 1 g/250 mL 0.9% NS (vial-mate)        Ordering Provider: Stephan Bolton MD    1,000 mg  over 60 Minutes Intravenous Once 21 1200 21 1319    21 1401  vancomycin 1 g/250 mL 0.9% NS (vial-mate)         Ordering Provider: Stephan Bolton MD    1,000 mg  over 60 Minutes Intravenous Once 11/02/21 1600 11/02/21 1743    10/30/21 1807  cefepime 2 gm IVPB in 100 ml NS (VTB)        Ordering Provider: Roland Alberto DO    2 g  over 4 Hours Intravenous Every 24 Hours 10/31/21 1700 11/07/21 1659    10/31/21 1417  vancomycin 1 g/250 mL 0.9% NS (vial-mate)        Ordering Provider: Stephan Bolton MD    1,000 mg  over 60 Minutes Intravenous Once 10/31/21 1600 10/31/21 1654    10/30/21 1813  vancomycin 1500 mg/500 mL 0.9% NS IVPB ([facility])        Ordering Provider: Stephan Bolton MD    20 mg/kg × 75.2 kg  over 90 Minutes Intravenous Once 10/30/21 1900 10/30/21 2107    10/30/21 1813  Vancomycin Pharmacy Intermittent Dosing        Ordering Provider: Roland Alberto DO     Does not apply Daily 10/30/21 1900 11/06/21 0859    10/30/21 1627  metroNIDAZOLE (FLAGYL) 500 mg/100mL IVPB        Ordering Provider: Michael Puente MD    500 mg  over 30 Minutes Intravenous Once 10/30/21 1629 10/30/21 1758        Current Diuretic Therapy:  Diuretics (From admission, onward)    None        ----------------------------------------------------------------------------------------------------------------------  Vital Signs:  Temp:  [97.5 °F (36.4 °C)-99.3 °F (37.4 °C)] 97.5 °F (36.4 °C)  Heart Rate:  [74-82] 75  Resp:  [16-20] 16  BP: (134-152)/(55-79) 151/67  SpO2:  [91 %-96 %] 93 %  on  Flow (L/min):  [3] 3;   Device (Oxygen Therapy): nasal cannula  Body mass index is 27.93 kg/m².    Wt Readings from Last 3 Encounters:   11/03/21 73.8 kg (162 lb 12.8 oz)   10/10/21 72.6 kg (160 lb)   02/05/20 78 kg (172 lb)     Intake & Output (last 3 days)       11/02 0701 11/03 0700 11/03 0701 11/04 0700 11/04 0701 11/05 0700 11/05 0701 11/06 0700    P.O. 0 980 1560 480    I.V. (mL/kg) 1982.1 (26.9) 178.6 (2.4) 1209.9 (16.4)     Total Intake(mL/kg) 1982.1 (26.9) 1158.6 (15.7) 2769.9 (37.5) 480 (6.5)    Urine (mL/kg/hr) 900 (0.5) 300 (0.2)       Total Output 900 300      Net +1082.1 +858.6 +2769.9 +480            Urine Unmeasured Occurrence  3 x 7 x 1 x    Stool Unmeasured Occurrence  1 x 1 x         Diet Regular; Consistent Carbohydrate  ----------------------------------------------------------------------------------------------------------------------  Physical exam:  Constitutional: older female, looks better today, no acute resp distress  HENT:  Head:  Normocephalic and atraumatic.  Mouth:  Moist mucous membranes.    Eyes:  Conjunctivae and EOM are normal. No scleral icterus.   Cardiovascular:  Normal rate, regular rhythm and normal heart sounds with no murmur. No JVD.   Pulmonary/Chest:  No respiratory distress, no wheezes, no crackles, with normal breath sounds. Diminished b/l. Unlabored. No accessory muscle use.  Abdominal:  Soft. No distension and no tenderness.  Bowel sounds present. No rebound or guarding.   Musculoskeletal:  No tenderness, and no deformity.  No red or swollen joints anywhere.    Neurological:  Alert and oriented to person, place, and time.  No cranial nerve deficit.   Nonfocal.   Skin:  Skin is warm and dry. No rash noted. No pallor.   Peripheral vascular:  No clubbing, no cyanosis, no edema. Pedal and tibial pulses 2/4 bilaterally.   ----------------------------------------------------------------------------------------------------------------------  Results from last 7 days   Lab Units 11/05/21  0552 11/04/21  0409 11/03/21  0648 11/01/21  0124 10/31/21  0514 10/30/21  1833 10/30/21  1513 10/30/21  1513   CRP mg/dL 1.37* 2.59* 3.44*   < > 4.02*  --    < > 3.39*   LACTATE mmol/L  --   --   --   --  0.8 2.4*  --  2.1*   WBC 10*3/mm3 10.16 9.85 7.01   < > 9.03  --    < > 8.48   HEMOGLOBIN g/dL 9.3* 9.4* 9.1*   < > 9.7*  --    < > 10.4*   HEMATOCRIT % 29.6* 30.6* 30.1*   < > 31.6*  --    < > 34.6   MCV fL 87.6 86.7 89.3   < > 89.5  --    < > 90.3   MCHC g/dL 31.4* 30.7* 30.2*   < > 30.7*  --    < > 30.1*   PLATELETS 10*3/mm3  310 340 278   < > 237  --    < > 245   INR   --   --   --   --   --   --   --  1.12*    < > = values in this interval not displayed.     Results from last 7 days   Lab Units 10/30/21  1356   PH, ARTERIAL pH units 7.359   PO2 ART mm Hg 46.6*   PCO2, ARTERIAL mm Hg 37.5   HCO3 ART mmol/L 21.1     Results from last 7 days   Lab Units 11/05/21  0552 11/04/21  0409 11/03/21  0648 10/31/21  0514 10/30/21  1513   SODIUM mmol/L 135* 136 134*   < > 135*   POTASSIUM mmol/L 4.9 4.9 4.9   < > 5.0   MAGNESIUM mg/dL  --   --   --   --  2.1   CHLORIDE mmol/L 102 100 101   < > 102   CO2 mmol/L 20.3* 24.6 22.9   < > 20.2*   BUN mg/dL 51* 45* 35*   < > 32*   CREATININE mg/dL 2.86* 2.95* 2.99*   < > 3.28*   EGFR IF NONAFRICN AM mL/min/1.73 17* 16* 16*   < > 14*   CALCIUM mg/dL 8.3* 8.5* 7.6*   < > 8.1*   GLUCOSE mg/dL 299* 341* 386*   < > 468*   ALBUMIN g/dL 2.93* 3.30* 2.78*   < > 3.16*   BILIRUBIN mg/dL 0.2 0.2 0.3   < > 0.3   ALK PHOS U/L 80 110 94   < > 104   AST (SGOT) U/L 11 8 9   < > 7   ALT (SGPT) U/L 10 8 5   < > 9    < > = values in this interval not displayed.   Estimated Creatinine Clearance: 21.1 mL/min (A) (by C-G formula based on SCr of 2.86 mg/dL (H)).  No results found for: AMMONIA  Results from last 7 days   Lab Units 11/01/21  0124 10/30/21  1513   CK TOTAL U/L 31  --    TROPONIN T ng/mL  --  <0.010     Results from last 7 days   Lab Units 10/30/21  1513   PROBNP pg/mL 714.7         Glucose   Date/Time Value Ref Range Status   11/05/2021 1054 419 (C) 70 - 130 mg/dL Final     Comment:     Confirmed by Repeat Meter: [id number] : [id number] FARHAT LOMBARDI   11/05/2021 0642 314 (H) 70 - 130 mg/dL Final     Comment:     Meter: [id number] : [id number] ANTONETTE DAVIES   11/04/2021 2314 275 (H) 70 - 130 mg/dL Final     Comment:     Meter: [id number] : [id number] MYRON MATT   11/04/2021 2042 406 (C) 70 - 130 mg/dL Final     Comment:     RN Notified Meter: [id number] : [id number] MYRON MATT   11/04/2021  1655 356 (H) 70 - 130 mg/dL Final     Comment:     Meter: [id number] : [id number] Brittany Collett   11/04/2021 1059 328 (H) 70 - 130 mg/dL Final     Comment:     Meter: [id number] : [id number] Brittany Collett   11/04/2021 0626 311 (H) 70 - 130 mg/dL Final     Comment:     Meter: [id number] : [id number] MEKHI WHITE   11/04/2021 0203 361 (H) 70 - 130 mg/dL Final     Comment:     Meter: [id number] : [id number] MYRON MATT     Lab Results   Component Value Date    TSH 0.397 10/09/2021     No results found for: PREGTESTUR, PREGSERUM, HCG, HCGQUANT  Pain Management Panel     Pain Management Panel Latest Ref Rng & Units 10/10/2021    CREATININE UR mg/dL 41.4        Brief Urine Lab Results  (Last result in the past 365 days)      Color   Clarity   Blood   Leuk Est   Nitrite   Protein   CREAT   Urine HCG        10/10/21 1527             41.4             Blood Culture   Date Value Ref Range Status   10/30/2021 No growth at 24 hours  Preliminary   10/30/2021 No growth at 24 hours  Preliminary     No results found for: URINECX  No results found for: WOUNDCX  No results found for: STOOLCX  No results found for: RESPCX  No results found for: AFBCX  Results from last 7 days   Lab Units 11/05/21  0552 11/04/21  0409 11/03/21  0648 11/02/21  0433 11/01/21  0124 10/31/21  0514 10/30/21  1833 10/30/21  1513   LACTATE mmol/L  --   --   --   --   --  0.8 2.4* 2.1*   SED RATE mm/hr  --   --   --  65*  --   --   --   --    CRP mg/dL 1.37* 2.59* 3.44* 5.82* 5.96* 4.02*  --  3.39*       I have personally looked at the labs and they are summarized above.  ----------------------------------------------------------------------------------------------------------------------  Detailed radiology reports for the last 24 hours:  Imaging Results (Last 24 Hours)     Procedure Component Value Units Date/Time    XR Chest PA & Lateral [[id number]] Collected: 11/05/21 1002     Updated: 11/05/21 1005    Narrative:      EXAM:    XR  Chest, 2 Views     EXAM DATE:    11/5/2021 9:24 AM     CLINICAL HISTORY:    f/u pna; J96.01-Acute respiratory failure with hypoxia;  J18.9-Pneumonia, unspecified organism; N17.9-Acute kidney failure,  unspecified; J18.9-Pneumonia, unspecified organism     TECHNIQUE:    Frontal and lateral views of the chest.     COMPARISON:    11/30/2021     FINDINGS:    LUNGS:  Subpleural airspace disease again noted.  Left lower lobe  lucency again noted.    PLEURAL SPACE:  Unremarkable.  No pneumothorax.    HEART:  Unremarkable.  No cardiomegaly.    MEDIASTINUM:  Unremarkable.    BONES/JOINTS:  Unremarkable.       Impression:        Subpleural airspace disease again noted.     This report was finalized on 11/5/2021 10:03 AM by Dr. Landry Haynes MD.           Assessment & Plan      Patient is a 56-year-old female with history significant for CKD stage IV presented with worsening shortness of breath and CAP.    #Sepsis secondary to CAP vs   #Acute hypoxemic respiratory failure due to CAP vs   #Recent COVID-19 dx  #Sepsis, resolved  --Patient remains hemodynamically stable.  93% on 3 L nasal cannula, 88% on 4L on exertion.  Does not wear home O2.  Recently hospitalized at this facility in [facility] with Covid. Significant hypoxia on exertion, spO2 drops in the 70s on ambulation to restroom. + SSA Ro, + RNP ab.  --Admission labs notable for CRP 3.4, WBC 8K, ABG 7.35/37/46 on room air, BUN/creatinine 32/3.2. ESR 65.   --CT chest without noted extensive bilateral groundglass opacities with progressive consolidation in multiple areas  --Echocardiogram with preserved ejection fraction, no diastolic dysfunction  --Legionella, respiratory panel, strep pneumo, MRSA nasal swab negative, anti PLAR2 neg  --follow up anca and complement levels, anti GBM, jewel profile  --follow up on BAL cx, fungal cx pending  --repeat CXR with persistent bilateral infiltrates however per my read it does look somewhat improved when compared to x-ray on 11/3,  especially in the right lower lobe  --discussed with pulmonology given significant hypoxia on exertion and inflammatory changes on CXR with abnormal +ssa/rnp ab, receiving pulse dose roids, appreciate Pulm's assistance  --Continue IV cefepime, vancomycin per ID recommendations  --Pulmonology following, appreciate recommendations     #ANA PAULA on CKD IV  #Metabolic acidosis  #Hyperkalemia  #Solitary kidney  --baseline renal function Cr 2.3. Had previously dialyzed 3x week. Previously referred to [facility] for 2nd opinion regarding transplant evaluation after not listed by [facility] with concern for medical noncompliance & poor social support. reportedly neg AI workup when preformed yrs ago when renal disease initially presented.  --Right kidney severely atrophic due to diabetic nephropathy  --UA with 4 g proteinuria, hematuria. CK wnl.   --Renal ultrasound of left kidney unremarkable  --c/w sodium bicarb, target serum bicarb 22  --Renally dose medications to GFR less than 15, avoid nephrotoxins  --Nephrology following, appreciate recs     #Hyperglycemia with type 2 DM  --A1c 10.9%. am glucose 299-exacerbated by steroid use. Basal had been decreased prior d/t wide swings of hyper/hypoglycemia. Mealtime >400, received 5u IV.  --increase basal to 40u, mealtime 12u tidac, SSI, will be difficult to control with steroids above, will continue to adjust accordingly     #Elevated d-dimer  --d-dimer 0.90. VQ scan low probability of PE.  --CT chest PE protocol not obtained given ANA PAULA on CKD IV.   --US venous doppler neg for DVT  --continue home Eliquis    #HLD, continue ASA 81, statin  #HTN, norvasc    #Medical Non-Adherence  --Given patient's reported choices and actions to not adhere to medical treatments and recommendations, this will drastically affect their short and long term morbidity and mortality.      CHECKLIST:  Abx: vancomycin, cefepime  Steroids: solumedrol 40 q8h  VTE: eliquis  GI ppx: ppi  Diet: diabetic, renal  Code: CPR,  full  Dispo: Patient remains hypoxic, especially on exertion. Anticipate >2 MN stay. Hopefully can get her home over the weekend. Anticipate she will require 3-4L NC. Disposition expected Home when medically stable.    Roland Alberto DO  [facility] Hospitalist  11/05/21  11:53 EDT

## 2021-11-05 NOTE — PLAN OF CARE
Goal Outcome Evaluation:           Progress: improving  Outcome Summary: Pt resting in bed at this time. No complaints or acute changes. Continues to be on 3L NC. Will continue to monitor.

## 2021-11-05 NOTE — CASE MANAGEMENT/SOCIAL WORK
Discharge Planning Assessment   [facility]     Patient Name: Jessica Bravo  MRN: [id number]  Today's Date: 11/5/2021    Admit Date: 10/30/2021       Discharge Plan     Row Name 11/05/21 1607       Plan    Plan SS spoke to pt on this date. Pt lives with her significant other and she plans to return home at discharge. Pt has O2 @ 3 liters NC (has portable O2 tanks), walker, and cane from [facility]. Pt does not utilize home health services. SS to follow.              NAZARIO Prado

## 2021-11-05 NOTE — PROGRESS NOTES
[facility]      Mar/chart reviewed  Follow up persistently abnormal ct chest  Still with shortness of breath, worse on exertion  She does feel better overall    Vital Sign Min/Max for last 24 hours  Temp  Min: 97.5 °F (36.4 °C)  Max: 99.3 °F (37.4 °C)   BP  Min: 134/55  Max: 152/79   Pulse  Min: 74  Max: 82   Resp  Min: 16  Max: 20   SpO2  Min: 91 %  Max: 96 %   Flow (L/min)  Min: 3  Max: 3   No data recorded     Appears ill, axox3,   perrl, eomi, no icterus,  mmm, no jvd, trachea midline, neck supple,  chest decreased ae bilaterally, no crackles, no wheezes,   rrr,   soft, nt, nd +bs,  no c/c/ e  Skin warm, dry  No rashes  Normal gait.     A/P:  1. Acute hypoxemic respiratory failure -- suspect she will remain oxygen dependent for sometime, possible indefinately.  Unclear how much of her current ct finding represent permanent post COVID fibrosis vs. Reversible inflammation or other process  2. Persistent pneumonia -- explained DDX includes infectious causes, organizing pneumonia, Autoimmune processes, and/or post covid fibrosis/inflammation.  now s/p bronchoscopy, micro negative thus far. Some abnormality to angela and ssa  3. CKD  4. Anemia    Would go to prednisone 40mg a day after today and continue that for 2 weeks then do a slow taper with outpatient follow up.

## 2021-11-05 NOTE — PLAN OF CARE
Goal Outcome Evaluation:  Plan of Care Reviewed With: patient        Progress: improving  Outcome Summary: Pt resting well throughout shift. No complaints or acute changes. Continues to be on 3L NC. Will continue to monitor.

## 2021-11-06 ENCOUNTER — READMISSION MANAGEMENT (OUTPATIENT)
Dept: CALL CENTER | Facility: HOSPITAL | Age: 56
End: 2021-11-06

## 2021-11-06 VITALS
WEIGHT: 162.8 LBS | SYSTOLIC BLOOD PRESSURE: 172 MMHG | RESPIRATION RATE: 15 BRPM | BODY MASS INDEX: 27.79 KG/M2 | HEART RATE: 71 BPM | HEIGHT: 64 IN | DIASTOLIC BLOOD PRESSURE: 79 MMHG | OXYGEN SATURATION: 96 % | TEMPERATURE: 98.3 F

## 2021-11-06 LAB
ALBUMIN SERPL-MCNC: 2.97 G/DL (ref 3.5–5.2)
ALBUMIN/GLOB SERPL: 0.9 G/DL
ALP SERPL-CCNC: 82 U/L (ref 39–117)
ALT SERPL W P-5'-P-CCNC: 11 U/L (ref 1–33)
ANION GAP SERPL CALCULATED.3IONS-SCNC: 12.6 MMOL/L (ref 5–15)
AST SERPL-CCNC: 11 U/L (ref 1–32)
BILIRUB SERPL-MCNC: 0.2 MG/DL (ref 0–1.2)
BUN SERPL-MCNC: 51 MG/DL (ref 6–20)
BUN/CREAT SERPL: 20.6 (ref 7–25)
C-ANCA TITR SER IF: NORMAL TITER
CALCIUM SPEC-SCNC: 8.4 MG/DL (ref 8.6–10.5)
CHLORIDE SERPL-SCNC: 106 MMOL/L (ref 98–107)
CO2 SERPL-SCNC: 20.4 MMOL/L (ref 22–29)
CREAT SERPL-MCNC: 2.47 MG/DL (ref 0.57–1)
CRP SERPL-MCNC: 0.87 MG/DL (ref 0–0.5)
DEPRECATED RDW RBC AUTO: 50.2 FL (ref 37–54)
ERYTHROCYTE [DISTWIDTH] IN BLOOD BY AUTOMATED COUNT: 15.8 % (ref 12.3–15.4)
GFR SERPL CREATININE-BSD FRML MDRD: 20 ML/MIN/1.73
GLOBULIN UR ELPH-MCNC: 3.2 GM/DL
GLUCOSE BLDC GLUCOMTR-MCNC: 202 MG/DL (ref 70–130)
GLUCOSE BLDC GLUCOMTR-MCNC: 293 MG/DL (ref 70–130)
GLUCOSE SERPL-MCNC: 218 MG/DL (ref 65–99)
HCT VFR BLD AUTO: 30.7 % (ref 34–46.6)
HGB BLD-MCNC: 9.5 G/DL (ref 12–15.9)
MCH RBC QN AUTO: 26.8 PG (ref 26.6–33)
MCHC RBC AUTO-ENTMCNC: 30.9 G/DL (ref 31.5–35.7)
MCV RBC AUTO: 86.7 FL (ref 79–97)
MYELOPEROXIDASE AB SER IA-ACNC: <9 U/ML (ref 0–9)
P-ANCA ATYPICAL TITR SER IF: NORMAL TITER
P-ANCA TITR SER IF: NORMAL TITER
PLATELET # BLD AUTO: 315 10*3/MM3 (ref 140–450)
PMV BLD AUTO: 9.3 FL (ref 6–12)
POTASSIUM SERPL-SCNC: 4.9 MMOL/L (ref 3.5–5.2)
PROT SERPL-MCNC: 6.2 G/DL (ref 6–8.5)
PROTEINASE3 AB SER IA-ACNC: <3.5 U/ML (ref 0–3.5)
RBC # BLD AUTO: 3.54 10*6/MM3 (ref 3.77–5.28)
SODIUM SERPL-SCNC: 139 MMOL/L (ref 136–145)
WBC # BLD AUTO: 11.26 10*3/MM3 (ref 3.4–10.8)

## 2021-11-06 PROCEDURE — 63710000001 INSULIN ASPART PER 5 UNITS: Performed by: PHYSICIAN ASSISTANT

## 2021-11-06 PROCEDURE — 86140 C-REACTIVE PROTEIN: CPT | Performed by: STUDENT IN AN ORGANIZED HEALTH CARE EDUCATION/TRAINING PROGRAM

## 2021-11-06 PROCEDURE — 63710000001 INSULIN ASPART PER 5 UNITS: Performed by: STUDENT IN AN ORGANIZED HEALTH CARE EDUCATION/TRAINING PROGRAM

## 2021-11-06 PROCEDURE — 85027 COMPLETE CBC AUTOMATED: CPT | Performed by: STUDENT IN AN ORGANIZED HEALTH CARE EDUCATION/TRAINING PROGRAM

## 2021-11-06 PROCEDURE — 80053 COMPREHEN METABOLIC PANEL: CPT | Performed by: STUDENT IN AN ORGANIZED HEALTH CARE EDUCATION/TRAINING PROGRAM

## 2021-11-06 PROCEDURE — 99239 HOSP IP/OBS DSCHRG MGMT >30: CPT | Performed by: STUDENT IN AN ORGANIZED HEALTH CARE EDUCATION/TRAINING PROGRAM

## 2021-11-06 PROCEDURE — 82962 GLUCOSE BLOOD TEST: CPT

## 2021-11-06 PROCEDURE — 25010000002 METHYLPREDNISOLONE PER 40 MG: Performed by: STUDENT IN AN ORGANIZED HEALTH CARE EDUCATION/TRAINING PROGRAM

## 2021-11-06 PROCEDURE — 25010000002 METHYLPREDNISOLONE PER 40 MG: Performed by: INTERNAL MEDICINE

## 2021-11-06 RX ORDER — BLOOD-GLUCOSE METER
KIT MISCELLANEOUS
Qty: 1 EACH | Refills: 0 | Status: SHIPPED | OUTPATIENT
Start: 2021-11-06

## 2021-11-06 RX ORDER — PREDNISONE 20 MG/1
40 TABLET ORAL
Qty: 28 TABLET | Refills: 0 | Status: SHIPPED | OUTPATIENT
Start: 2021-11-07 | End: 2021-11-21

## 2021-11-06 RX ORDER — PREDNISONE 20 MG/1
40 TABLET ORAL
Status: DISCONTINUED | OUTPATIENT
Start: 2021-11-07 | End: 2021-11-06 | Stop reason: HOSPADM

## 2021-11-06 RX ORDER — NAPROXEN SODIUM 220 MG
1 TABLET ORAL 4 TIMES DAILY PRN
Qty: 100 EACH | Refills: 12 | Status: SHIPPED | OUTPATIENT
Start: 2021-11-06

## 2021-11-06 RX ORDER — LANCETS 30 GAUGE
EACH MISCELLANEOUS
Qty: 100 EACH | Refills: 12 | Status: SHIPPED | OUTPATIENT
Start: 2021-11-06

## 2021-11-06 RX ORDER — METHYLPREDNISOLONE SODIUM SUCCINATE 40 MG/ML
40 INJECTION, POWDER, LYOPHILIZED, FOR SOLUTION INTRAMUSCULAR; INTRAVENOUS EVERY 12 HOURS
Status: DISCONTINUED | OUTPATIENT
Start: 2021-11-06 | End: 2021-11-06 | Stop reason: HOSPADM

## 2021-11-06 RX ORDER — PREDNISONE 20 MG/1
40 TABLET ORAL
Status: DISCONTINUED | OUTPATIENT
Start: 2021-11-06 | End: 2021-11-06

## 2021-11-06 RX ORDER — IBUPROFEN 600 MG/1
TABLET ORAL
Qty: 1 EACH | Refills: 1 | Status: SHIPPED | OUTPATIENT
Start: 2021-11-06

## 2021-11-06 RX ORDER — BLOOD SUGAR DIAGNOSTIC
STRIP MISCELLANEOUS
Qty: 100 EACH | Refills: 12 | Status: SHIPPED | OUTPATIENT
Start: 2021-11-06

## 2021-11-06 RX ORDER — SODIUM BICARBONATE 650 MG/1
650 TABLET ORAL 3 TIMES DAILY
Qty: 90 TABLET | Refills: 0 | Status: SHIPPED | OUTPATIENT
Start: 2021-11-06 | End: 2021-12-06

## 2021-11-06 RX ORDER — ATORVASTATIN CALCIUM 40 MG/1
40 TABLET, FILM COATED ORAL DAILY
Qty: 30 TABLET | Refills: 0 | Status: SHIPPED | OUTPATIENT
Start: 2021-11-07

## 2021-11-06 RX ADMIN — INSULIN ASPART 8 UNITS: 100 INJECTION, SOLUTION INTRAVENOUS; SUBCUTANEOUS at 11:08

## 2021-11-06 RX ADMIN — METHYLPREDNISOLONE SODIUM SUCCINATE 40 MG: 40 INJECTION, POWDER, FOR SOLUTION INTRAMUSCULAR; INTRAVENOUS at 07:53

## 2021-11-06 RX ADMIN — ATORVASTATIN CALCIUM 40 MG: 40 TABLET, FILM COATED ORAL at 08:00

## 2021-11-06 RX ADMIN — INSULIN ASPART 5 UNITS: 100 INJECTION, SOLUTION INTRAVENOUS; SUBCUTANEOUS at 08:10

## 2021-11-06 RX ADMIN — FLUTICASONE PROPIONATE 1 SPRAY: 50 SPRAY, METERED NASAL at 08:01

## 2021-11-06 RX ADMIN — OFLOXACIN 50000 UNITS: 300 TABLET, COATED ORAL at 08:00

## 2021-11-06 RX ADMIN — INSULIN ASPART 12 UNITS: 100 INJECTION, SOLUTION INTRAVENOUS; SUBCUTANEOUS at 11:08

## 2021-11-06 RX ADMIN — SODIUM CHLORIDE, PRESERVATIVE FREE 10 ML: 5 INJECTION INTRAVENOUS at 08:11

## 2021-11-06 RX ADMIN — METHYLPREDNISOLONE SODIUM SUCCINATE 40 MG: 40 INJECTION, POWDER, FOR SOLUTION INTRAMUSCULAR; INTRAVENOUS at 00:37

## 2021-11-06 RX ADMIN — AMLODIPINE BESYLATE 5 MG: 5 TABLET ORAL at 08:00

## 2021-11-06 RX ADMIN — PANTOPRAZOLE SODIUM 40 MG: 40 TABLET, DELAYED RELEASE ORAL at 08:00

## 2021-11-06 RX ADMIN — APIXABAN 5 MG: 5 TABLET, FILM COATED ORAL at 08:00

## 2021-11-06 RX ADMIN — SODIUM BICARBONATE TAB 650 MG 650 MG: 650 TAB at 08:01

## 2021-11-06 RX ADMIN — ASPIRIN 81 MG: 81 TABLET, COATED ORAL at 08:00

## 2021-11-06 RX ADMIN — INSULIN ASPART 12 UNITS: 100 INJECTION, SOLUTION INTRAVENOUS; SUBCUTANEOUS at 08:10

## 2021-11-06 RX ADMIN — LINACLOTIDE 145 MCG: 145 CAPSULE, GELATIN COATED ORAL at 07:52

## 2021-11-06 NOTE — PROGRESS NOTES
PROGRESS NOTE         Patient Identification:  Name:  Jessica Bravo  Age:  56 y.o.  Sex:  female  :  1965  MRN:  [id number]  Visit Number:  [id number]  Primary Care Provider:  Buck Wallis APRN         LOS: 7 days       ----------------------------------------------------------------------------------------------------------------------  Subjective       Chief Complaints:    Shortness of Breath and Cough        Interval History:      Patient remains on 3 L nasal cannula this morning in no apparent distress.  Status post bronchoscopy on 2021.  Patient is feeling well this morning and denies any complaints at this time.  She is eager to return home soon.  Afebrile, no diarrhea.  CRP is improved at 0.87.  WBC is slightly elevated at 11.26.  Chest x-ray on 2021 showed subpleural airspace disease again.    Review of Systems:    Constitutional: no fever, chills and night sweats. No appetite change or unexpected weight change. No fatigue.  Eyes: no eye drainage, itching or redness.  HEENT: no mouth sores, dysphagia or nose bleed.  Respiratory:  No shortness of breath, cough, or production of sputum.  Cardiovascular: no chest pain, no palpitations, no orthopnea.  Gastrointestinal: no nausea, vomiting or diarrhea. No abdominal pain, hematemesis or rectal bleeding.  Genitourinary: no dysuria or polyuria.  Hematologic/lymphatic: no lymph node abnormalities, no easy bruising or easy bleeding.  Musculoskeletal: no muscle or joint pain.  Skin: No rash and no itching.  Neurological: no loss of consciousness, no seizure, no headache.  Behavioral/Psych: no depression or suicidal ideation.  Endocrine: no hot flashes.  Immunologic: negative.    ----------------------------------------------------------------------------------------------------------------------      Objective       Current [facility] Meds:  amLODIPine, 5 mg, Oral, Q24H  apixaban, 5 mg, Oral, Q12H  aspirin, 81 mg, Oral,  Daily  atorvastatin, 40 mg, Oral, Daily  cefepime, 2 g, Intravenous, Q24H  cholecalciferol, 50,000 Units, Oral, Weekly  fluticasone, 1 spray, Nasal, BID  hydrOXYzine, 25 mg, Oral, Nightly  insulin aspart, 0-14 Units, Subcutaneous, 4x Daily PC & at Bedtime  insulin aspart, 12 Units, Subcutaneous, TID With Meals  insulin detemir, 40 Units, Subcutaneous, Nightly  linaclotide, 145 mcg, Oral, QAM AC  methylPREDNISolone sodium succinate, 40 mg, Intravenous, Q12H  montelukast, 10 mg, Oral, Nightly  pantoprazole, 40 mg, Oral, Daily  [START ON 11/7/2021] predniSONE, 40 mg, Oral, Daily With Breakfast  sodium bicarbonate, 650 mg, Oral, BID  sodium chloride, 10 mL, Intravenous, Q12H  sodium chloride, 10 mL, Intravenous, Q12H  sodium chloride, 10 mL, Intravenous, Q12H         ----------------------------------------------------------------------------------------------------------------------    Vital Signs:  Temp:  [97.5 °F (36.4 °C)-98.2 °F (36.8 °C)] 98 °F (36.7 °C)  Heart Rate:  [69-78] 71  Resp:  [16-18] 18  BP: (136-167)/(56-83) 145/67  Mean Arterial Pressure (Non-Invasive) for the past 24 hrs (Last 3 readings):   Noninvasive MAP (mmHg)   11/05/21 1104 87     SpO2 Percentage    11/05/21 1104 11/05/21 1900 11/06/21 0600   SpO2: 93% 96% 94%     SpO2:  [93 %-96 %] 94 %  on  Flow (L/min):  [3] 3;   Device (Oxygen Therapy): nasal cannula    Body mass index is 27.93 kg/m².  Wt Readings from Last 3 Encounters:   11/03/21 73.8 kg (162 lb 12.8 oz)   10/10/21 72.6 kg (160 lb)   02/05/20 78 kg (172 lb)        Intake/Output Summary (Last 24 hours) at 11/6/2021 0907  Last data filed at 11/6/2021 0300  Gross per 24 hour   Intake 600 ml   Output --   Net 600 ml     Diet Regular; Consistent Carbohydrate  ----------------------------------------------------------------------------------------------------------------------      Physical Exam:      Constitutional:  Well-developed and well-nourished  female is sitting up in bed.  No  respiratory distress.      HENT:  Head: Normocephalic and atraumatic.  Mouth:  Moist mucous membranes.    Eyes:  Conjunctivae and EOM are normal.  No scleral icterus.  Neck:  Neck supple.  No JVD present.    Cardiovascular:  Normal rate, regular rhythm and normal heart sounds with no murmur. No edema.  Pulmonary/Chest:  No respiratory distress, no wheezes, and no crackles.  Normal breath sounds throughout but diminished bilaterally.  Abdominal:  Soft.  Bowel sounds are normal.  No distension and no tenderness.   Musculoskeletal:  No edema, no tenderness, and no deformity.  No swelling or redness of joints.  Neurological:  Alert and oriented to person, place, and time.  No facial droop.  No slurred speech.   Skin:  Skin is warm and dry.  No rash noted.  No pallor.   Psychiatric:  Normal mood and affect.  Behavior is normal.       ----------------------------------------------------------------------------------------------------------------------  Results from last 7 days   Lab Units 11/01/21  0124 10/30/21  1513   CK TOTAL U/L 31  --    TROPONIN T ng/mL  --  <0.010     Results from last 7 days   Lab Units 10/30/21  1513   PROBNP pg/mL 714.7       Results from last 7 days   Lab Units 10/30/21  1356   PH, ARTERIAL pH units 7.359   PO2 ART mm Hg 46.6*   PCO2, ARTERIAL mm Hg 37.5   HCO3 ART mmol/L 21.1     Results from last 7 days   Lab Units 11/06/21  0459 11/05/21  0552 11/04/21  0409 11/01/21  0124 10/31/21  0514 10/30/21  1833 10/30/21  1513 10/30/21  1513   CRP mg/dL 0.87* 1.37* 2.59*   < > 4.02*  --    < > 3.39*   LACTATE mmol/L  --   --   --   --  0.8 2.4*  --  2.1*   WBC 10*3/mm3 11.26* 10.16 9.85   < > 9.03  --    < > 8.48   HEMOGLOBIN g/dL 9.5* 9.3* 9.4*   < > 9.7*  --    < > 10.4*   HEMATOCRIT % 30.7* 29.6* 30.6*   < > 31.6*  --    < > 34.6   MCV fL 86.7 87.6 86.7   < > 89.5  --    < > 90.3   MCHC g/dL 30.9* 31.4* 30.7*   < > 30.7*  --    < > 30.1*   PLATELETS 10*3/mm3 315 310 340   < > 237  --    < > 245    INR   --   --   --   --   --   --   --  1.12*    < > = values in this interval not displayed.     Results from last 7 days   Lab Units 11/06/21  0459 11/05/21  0552 11/04/21  0409 10/31/21  0514 10/30/21  1513   SODIUM mmol/L 139 135* 136   < > 135*   POTASSIUM mmol/L 4.9 4.9 4.9   < > 5.0   MAGNESIUM mg/dL  --   --   --   --  2.1   CHLORIDE mmol/L 106 102 100   < > 102   CO2 mmol/L 20.4* 20.3* 24.6   < > 20.2*   BUN mg/dL 51* 51* 45*   < > 32*   CREATININE mg/dL 2.47* 2.86* 2.95*   < > 3.28*   EGFR IF NONAFRICN AM mL/min/1.73 20* 17* 16*   < > 14*   CALCIUM mg/dL 8.4* 8.3* 8.5*   < > 8.1*   GLUCOSE mg/dL 218* 299* 341*   < > 468*   ALBUMIN g/dL 2.97* 2.93* 3.30*   < > 3.16*   BILIRUBIN mg/dL 0.2 0.2 0.2   < > 0.3   ALK PHOS U/L 82 80 110   < > 104   AST (SGOT) U/L 11 11 8   < > 7   ALT (SGPT) U/L 11 10 8   < > 9    < > = values in this interval not displayed.   Estimated Creatinine Clearance: 24.4 mL/min (A) (by C-G formula based on SCr of 2.47 mg/dL (H)).  No results found for: AMMONIA    Glucose   Date/Time Value Ref Range Status   11/06/2021 0616 202 (H) 70 - 130 mg/dL Final     Comment:     Meter: [id number] : [id number] mayne mary   11/05/2021 1930 311 (H) 70 - 130 mg/dL Final     Comment:     Meter: [id number] : [id number] Shun Chapa   11/05/2021 1651 300 (H) 70 - 130 mg/dL Final     Comment:     Meter: [id number] : [id number] FARHAT LOMBARDI   11/05/2021 1212 379 (H) 70 - 130 mg/dL Final     Comment:     Meter: [id number] : [id number] FARHAT LOMBARDI   11/05/2021 1054 419 (C) 70 - 130 mg/dL Final     Comment:     Confirmed by Repeat Meter: [id number] : [id number] FARHAT LOMBARDI   11/05/2021 0642 314 (H) 70 - 130 mg/dL Final     Comment:     Meter: [id number] : [id number] ANTONETTE DAVIES   11/04/2021 2314 275 (H) 70 - 130 mg/dL Final     Comment:     Meter: [id number] : [id number] MYRON MATT   11/04/2021 2042 406 (C) 70 - 130 mg/dL Final     Comment:     RN Notified Meter:  [id number] : [id number] MYRON MATT     Lab Results   Component Value Date    HGBA1C 10.50 (H) 10/09/2021     Lab Results   Component Value Date    TSH 0.397 10/09/2021       Blood Culture   Date Value Ref Range Status   10/30/2021 No growth at 24 hours  Preliminary   10/30/2021 No growth at 24 hours  Preliminary     No results found for: URINECX  No results found for: WOUNDCX  No results found for: STOOLCX  No results found for: RESPCX  Pain Management Panel       Pain Management Panel Latest Ref Rng & Units 10/10/2021    CREATININE UR mg/dL 41.4              ----------------------------------------------------------------------------------------------------------------------  Imaging Results (Last 24 Hours)       Procedure Component Value Units Date/Time    XR Chest PA & Lateral [[id number]] Collected: 11/05/21 1002     Updated: 11/05/21 1005    Narrative:      EXAM:    XR Chest, 2 Views     EXAM DATE:    11/5/2021 9:24 AM     CLINICAL HISTORY:    f/u pna; J96.01-Acute respiratory failure with hypoxia;  J18.9-Pneumonia, unspecified organism; N17.9-Acute kidney failure,  unspecified; J18.9-Pneumonia, unspecified organism     TECHNIQUE:    Frontal and lateral views of the chest.     COMPARISON:    11/30/2021     FINDINGS:    LUNGS:  Subpleural airspace disease again noted.  Left lower lobe  lucency again noted.    PLEURAL SPACE:  Unremarkable.  No pneumothorax.    HEART:  Unremarkable.  No cardiomegaly.    MEDIASTINUM:  Unremarkable.    BONES/JOINTS:  Unremarkable.       Impression:        Subpleural airspace disease again noted.     This report was finalized on 11/5/2021 10:03 AM by Dr. Landry Haynes MD.               ----------------------------------------------------------------------------------------------------------------------    Assessment/Plan       Assessment/Plan     ASSESSMENT:    1.  Severe sepsis with lactic acid greater than 2 on admission  2.  Bilateral pneumonia    PLAN:    Patient remains on  3 L nasal cannula this morning in no apparent distress.  Status post bronchoscopy on 11/2/2021.  Patient is feeling well this morning and denies any complaints at this time.  She is eager to return home soon.  Afebrile, no diarrhea.  CRP is improved at 0.87.  WBC is slightly elevated at 11.26.  Chest x-ray on 11/5/2021 showed subpleural airspace disease again.    Patient was recently hospitalized at our facility from 10/9/2021 through 10/15/2021 for sepsis secondary to COVID-19 pneumonia and was treated with a regimen of Levaquin, Flagyl, micafungin after concern for superimposed bacterial pneumonia and underlying UTI.  Levaquin and Flagyl p.o. course were to be finished on 10/20/2021.    MRSA PCR negative.  Legionella negative.  VQ scan from 10/30/2021 reports low probability of PE.  Chest x-ray from 10/30/2021 reports significant interval worsening of bilateral pneumonia.  CT of the chest from 10/30/2021 reports continued extensive bilateral parenchymal airspace disease with progressive consolidation in multiple areas of groundglass opacity particularly in the lower lung zones.  BAL culture on 11/2/2021 finalized as normal bubba.  AFB culture on 11/2/2021 is so far negative.  Fungus culture on 11/2/2021 is in process.  Respiratory panel on 11/2/2021 was negative.  Pneumocystis smear on 11/2/2021 was negative.     Patient has completed course of vancomycin and is completing course of cefepime today. We will continue to follow closely off antibiotic coverage.  Stable from an ID standpoint.    Code Status:   Code Status and Medical Interventions:   Ordered at: 10/30/21 8677     Code Status (Patient has no pulse and is not breathing):    CPR (Attempt to Resuscitate)     Medical Interventions (Patient has pulse or is breathing):    Full       JOHN Holm  11/06/21  09:07 EDT

## 2021-11-06 NOTE — OUTREACH NOTE
Prep Survey      Responses   [facility] facility patient discharged from? [facility]   Is LACE score < 7 ? No   Emergency Room discharge w/ pulse ox? No   Eligibility Readm Mgmt   Discharge diagnosis Recent COVID-19 dx Sepsis secondary to CAP vs   ANA PAULA on CKD IV   Does the patient have one of the following disease processes/diagnoses(primary or secondary)? COVID-19   Does the patient have Home health ordered? No   Is there a DME ordered? Yes   What DME was ordered? O2 @ 3 liters NC (has portable O2 tanks), walker, and cane from [facility].    Prep survey completed? Yes          Latasha Harper RN

## 2021-11-06 NOTE — PLAN OF CARE
Goal Outcome Evaluation:  Plan of Care Reviewed With: patient        Progress: no change  Outcome Summary: Patient resting well this shift. No changes noted at this time

## 2021-11-06 NOTE — PLAN OF CARE
Goal Outcome Evaluation:  Plan of Care Reviewed With: patient        Progress: improving  Outcome Summary: Pt resting well this shift. No complaints or acute changes at this time. Pt being discharged.

## 2021-11-06 NOTE — DISCHARGE SUMMARY
[facility] HOSPITALISTS DISCHARGE SUMMARY    Patient Identification:  Name:  Jessica Bravo  Age:  56 y.o.  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 female presented to [facility] complaining of worsening shortness of breath. Please see the admitting history and physical for further details.  She had been previously discharged on 10/15 after being admitted and treated for COVID-19 and bacterial pneumonia.  Had also been admitted to [facility] with Covid.  Initial symptoms included fever/chills at home, productive cough.  Initial labs noted no leukocytosis, minimally elevated CRP, sed rate 65 with a PaO2 of 46 on room air.  CT chest noted extensive bilateral groundglass opacities with progressive consolidations in multiple areas.  She was empirically treated with vancomycin and cefepime without any significant improvement throughout the hospitalizations.  She remained afebrile, infectious work-up is negative to date.  Given persistent symptoms and persistent infiltrates on x-ray, cryptogenic organizing pneumonia was in the differential.  She underwent bronchoscopy with BAL with pulmonology-cultures are negative to date, fungal culture is still pending.  On the [facility], pulm noted no significant secretions but rather significantly inflamed and irritated airways.  Given new pulmonary involvement and known CKD 4, autoimmune  work-up was ordered.  She was positive for SSA Ro and RNP antibody which was nondiagnostic (she does have symptoms consistent w/ Sjorgens), ANCA panel was still pending (previously neg on prior workup per nephro).  Also possible COVID-19 fibrosis was considered. given her lack of improvement with antibiotics, she received pulse dose steroids to treat  again without significant improvement.  She was discharged on p.o. prednisone for an extended taper with appointments made to follow-up with pulmonology for evaluation of prolonged course to determine if further corticosteroids are necessary.  She was given instructions to follow-up with nephrology and PCP within a week.    At the time of discharge, she remained stable on 3 L nasal cannula at baseline.  She required 5 L of nasal cannula on exertion to maintain SPO2 greater than 88%. She was d/c'd with a pulse ox.  She was adamant to be discharged home and it was felt she had reached maximum benefit of this hospitalization and persistent supplemental oxygen would be required.    VITAL SIGNS:  Temp:  [98 °F (36.7 °C)-98.3 °F (36.8 °C)] 98.3 °F (36.8 °C)  Heart Rate:  [69-78] 71  Resp:  [15-18] 15  BP: (136-172)/(56-83) 172/79  SpO2:  [94 %-96 %] 96 %  on  Flow (L/min):  [3] 3;   Device (Oxygen Therapy): nasal cannula    Body mass index is 27.93 kg/m².  Wt Readings from Last 3 Encounters:   11/03/21 73.8 kg (162 lb 12.8 oz)   10/10/21 72.6 kg (160 lb)   02/05/20 78 kg (172 lb)       PHYSICAL EXAM:  Constitutional: older female, looks better today, no acute resp distress  HENT:  Head:  Normocephalic and atraumatic.  Mouth:  Moist mucous membranes.    Eyes:  Conjunctivae and EOM are normal. No scleral icterus.   Cardiovascular:  Normal rate, regular rhythm and normal heart sounds with no murmur. No JVD.   Pulmonary/Chest:  No respiratory distress, no wheezes, no crackles, with normal breath sounds. Diminished b/l. Unlabored. No accessory muscle use.  Abdominal:  Soft. No  "distension and no tenderness.  Bowel sounds present. No rebound or guarding.   Musculoskeletal:  No tenderness, and no deformity.  No red or swollen joints anywhere.    Neurological:  Alert and oriented to person, place, and time.  No cranial nerve deficit.   Nonfocal.   Skin:  Skin is warm and dry. No rash noted. No pallor.   Peripheral vascular:  No clubbing, no cyanosis, no edema. Pedal and tibial pulses 2/4 bilaterally.    DISCHARGE DISPOSITION   Stable    DISCHARGE MEDICATIONS:     Discharge Medications      New Medications      Instructions Start Date   Alcohol Pads 70 % pads   Apply one alcohol swab to injection site of skin immediately prior to insulin injection. Formulary Compliance Approval.      atorvastatin 40 MG tablet  Commonly known as: LIPITOR   40 mg, Oral, Daily   Start Date: November 7, 2021     Glucagon Emergency 1 MG kit   Use as directed for emergently low blood glucose level. Formulary Compliance Approval      glucose blood test strip   Use to test blood glucose up to four times daily as needed. Formulary Compliance Approval. Diagnosis: Type 2 Diabetes - Insulin Dependent      Glucose Management tablet   Use as needed for emergently low blood glucose levels. Formulary Compliance Approval      glucose monitor monitoring kit   Use to test blood glucose up to four times daily as needed. Formulary Compliance Approval. Diagnosis: Type 2 Diabetes - Insulin Dependent      insulin detemir 100 UNIT/ML injection  Commonly known as: LEVEMIR   30 Units, Subcutaneous, Nightly      Insulin Syringe 31G X 5/16\" 0.5 ML misc   1 each, Subcutaneous, 4 Times Daily PRN, Formulary Compliance Approval      Lancets misc   Use to test blood glucose up to four times daily as needed. Formulary Compliance Approval. Diagnosis: Type 2 Diabetes - Insulin Dependent      predniSONE 20 MG tablet  Commonly known as: DELTASONE   40 mg, Oral, Daily With Breakfast   Start Date: November 7, 2021     sodium bicarbonate 650 MG " tablet   650 mg, Oral, 3 Times Daily         Changes to Medications      Instructions Start Date   insulin aspart 100 UNIT/ML injection  Commonly known as: novoLOG  What changed:   · how much to take  · when to take this  · additional instructions   0-14 Units, Subcutaneous, 4 Times Daily After Meals & at Bedtime         Continue These Medications      Instructions Start Date   albuterol sulfate  (90 Base) MCG/ACT inhaler  Commonly known as: PROVENTIL HFA;VENTOLIN HFA;PROAIR HFA   2 puffs, Inhalation, Every 4 Hours PRN      amLODIPine 5 MG tablet  Commonly known as: NORVASC   5 mg, Oral, Every 24 Hours Scheduled      aspirin 81 MG tablet   81 mg, Oral, Daily      Eliquis 5 MG tablet tablet  Generic drug: apixaban   5 mg, Oral, Every 12 Hours Scheduled      famotidine 20 MG tablet  Commonly known as: PEPCID   20 mg, Oral, Nightly PRN      fluticasone 50 MCG/ACT nasal spray  Commonly known as: FLONASE   1 spray, Nasal, 2 times daily      halobetasol 0.05 % cream  Commonly known as: ULTRAVATE   1 application, Topical, 2 Times Daily PRN      hydrOXYzine 25 MG tablet  Commonly known as: ATARAX   25 mg, Oral, Nightly      linaclotide 145 MCG capsule capsule  Commonly known as: LINZESS   145 mcg, Oral, Every Morning Before Breakfast      montelukast 10 MG tablet  Commonly known as: SINGULAIR   10 mg, Oral, Nightly      pantoprazole 40 MG EC tablet  Commonly known as: Protonix   40 mg, Oral, Daily      vitamin D 1.25 MG (91762 UT) capsule capsule  Commonly known as: ERGOCALCIFEROL   50,000 Units, Oral, Weekly         Stop These Medications    insulin regular 100 UNIT/ML injection  Commonly known as: humuLIN R,novoLIN R     pravastatin 10 MG tablet  Commonly known as: PRAVACHOL              Additional Instructions for the Follow-ups that You Need to Schedule     Discharge Follow-up with PCP   As directed       Currently Documented PCP:    Buck Wallis APRN    PCP Phone Number:    [phone]     [facility]  [facility]         Discharge Follow-up with Specialty: Nephrology-Dr. Araiza; 1 Week   As directed      Specialty: Nephrology-Dr. Araiza    Follow Up: 1 Week            Follow-up Information     Buck Wallis, APRN .    Specialty: Family Medicine  [facility]  Contact information:  [address] [facility] [address]  [phone]             Aaron Ferguson, APRN .    Specialty: Nurse Practitioner  [facility]  Contact information:  [address]  [facility] [address]  [facility] [address]  [phone]             Tami Pedersen PA-C .    Specialty: Pulmonary Disease  Contact information:  [address] [facility]   [facility] [address]  [facility] [id number] [phone]                          TEST  RESULTS PENDING AT DISCHARGE  Pending Labs     Order Current Status    ANCA Panel In process    AFB Culture - Lavage, Lung, Right Middle Lobe Preliminary result    Fungus Culture - Lavage, Lung, Right Middle Lobe Preliminary result           CODE STATUS  Code Status and Medical Interventions:   Ordered at: 10/30/21 1635     Code Status (Patient has no pulse and is not breathing):    CPR (Attempt to Resuscitate)     Medical Interventions (Patient has pulse or is breathing):    Full       Roland Alberto DO  [facility]  11/06/21  13:35 EDT    Please note that this discharge summary required more than 30 minutes to complete.      --------------------    I have reviewed her preliminary BAL culture results noting Kaylyn dubliniensis.  I spoke to the patient on the phone and updated her of the results.  She stated she was doing well without any fevers, productive sputum and was no longer requiring supplemental oxygen.  Given the fact that she has improved, I will not treat with antifungals.  I instructed her to discuss these results with her PCP at her next visit.    Electronically signed by Roland Albetro DO, 11/19/21, 6:37 PM EST.

## 2021-11-07 ENCOUNTER — READMISSION MANAGEMENT (OUTPATIENT)
Dept: CALL CENTER | Facility: HOSPITAL | Age: 56
End: 2021-11-07

## 2021-11-07 NOTE — PAYOR COMM NOTE
"[facility]  JULIO CÉSAR HAMMOND  PHONE  [phone]  FAX  [phone]  NPI:  [id number]    PATIENT D/C 11/6/21    Jessica Hamm (56 y.o. Female)             Date of Birth Social Security Number Address Home Phone MRN    1965  [address] [facility] [address] [phone] [id number]    Gnosticist Marital Status             None Single       Admission Date Admission Type Admitting Provider Attending Provider Department, Room/Bed    10/30/21 Emergency Stephan Bolton MD  [facility] [address] [place], 3340/1S    Discharge Date Discharge Disposition Discharge Destination          11/6/2021 Home or Self Care              Attending Provider: (none)   Allergies: Bactrim [Sulfamethoxazole-trimethoprim], Sulfa Antibiotics, Benadryl [Diphenhydramine Hcl (Sleep)], Contrast Dye, Penicillins    Isolation: None   Infection: COVID (History) (10/31/21)   Code Status: Prior   Advance Care Planning Activity    Ht: 162.6 cm (64.02\")   Wt: 73.8 kg (162 lb 12.8 oz)    Admission Cmt: None   Principal Problem: Pneumonia of both lower lobes due to infectious organism [J18.9]                 Active Insurance as of 10/30/2021     Primary Coverage     Payor Plan Insurance Group Employer/Plan Group    MEDICARE MEDICARE A & B      Payor Plan Address Payor Plan Phone Number Payor Plan Fax Number Effective Dates    [address] [phone]  2/1/2016 - None Entered    [facility] [address]       Subscriber Name Subscriber Birth Date Member ID       JESSICA HAMM 1965 [id number]           Secondary Coverage     Payor Plan Insurance Group Employer/Plan Group    [employer] [place] WELL[place]ARE MEDICAID      Payor Plan Address Payor Plan Phone Number Payor Plan Fax Number Effective Dates    [address] [address] [phone]  3/9/2017 - None Entered    [facility] [address]       Subscriber Name Subscriber Birth Date Member ID       JESSICA HAMM 1965 [id number]                 Emergency Contacts      (Rel.) Home Phone Work Phone " Mobile Phone    Africa Simpson (Sister) [phone] -- --    MIRTA HAMM (Son) [phone] -- --

## 2021-11-07 NOTE — OUTREACH NOTE
COVID-19 Week 1 Survey      Responses   [facility] patient discharged from? [facility]   Does the patient have one of the following disease processes/diagnoses(primary or secondary)? COVID-19   COVID-19 underlying condition? Sepsis   Call Number Call 1   Week 1 Call successful? Yes   Call start time 1017   Call end time 1024   Discharge diagnosis Recent COVID-19 dx Sepsis secondary to CAP vs   ANA PAULA on CKD IV   Meds reviewed with patient/caregiver? Yes   Is the patient having any side effects they believe may be caused by any medication additions or changes? No   Does the patient have all medications ordered at discharge? Yes   Is the patient taking all medications as directed (includes completed medication regime)? No   What is preventing the patient from taking all medications as directed? Other   Nursing Interventions Nurse provided patient education   Medication comments Pt reports Pharm was closed yesterday when she discharged and today. Pt will  meds in the AM. Pt has insulin at home and testing supplies.    Does the patient have a primary care provider?  Yes   Comments regarding PCP Will call tomorrow.    Has the patient kept scheduled appointments due by today? N/A   What DME was ordered? O2 @ 3 liters NC (has portable O2 tanks), walker, and cane from [facility].    Has all DME been delivered? Yes   Psychosocial issues? No   Did the patient receive a copy of their discharge instructions? Yes   Did the patient receive a copy of COVID-19 specific instructions? Yes   Nursing interventions Reviewed instructions with patient   What is the patient's perception of their health status since discharge? Improving   Does the patient have any of the following symptoms? None   Nursing Interventions Nurse provided patient education   Pulse Ox monitoring Intermittent   Pulse Ox device source Patient   O2 Sat comments Without O2 on- 96% at rest.    O2 Sat: education provided Sat levels,  Monitoring  frequency,  When to seek care   Is the patient/caregiver able to teach back steps to recovery at home? Eat a well-balance diet,  Rest and rebuild strength, gradually increase activity,  Set small, achievable goals for return to baseline health   If the patient is a current smoker, are they able to teach back resources for cessation? Not a smoker   Is the patient/caregiver able to teach back the hierarchy of who to call/visit for symptoms/problems? PCP, Specialist, Home health nurse, Urgent Care, ED, 911 Yes   Is the patient/caregiver able to teach back Sepsis? S - Short of breath,  P - Pale or discolored skin,  E - Extreme pain or generalized discomfort (worst ever,especially abdomen),  S - Shivering,fever or very cold,  S - Sleepy, difficult to arouse,confused   Nursing interventions Nurse provided patient education   Is patient/caregiver able to teach back steps to recovery at home? Eat a balanced diet,  Rest and regain strength   Is the patient/caregiver able to teach back signs and symptoms of worsening condition: Fever,  Rapid heart rate (>90),  Altered mental status(confusion/coma)   COVID-19 call completed? Yes          Cat Mandujano RN

## 2021-11-08 ENCOUNTER — TELEPHONE (OUTPATIENT)
Dept: MEDSURG UNIT | Facility: HOSPITAL | Age: 56
End: 2021-11-08

## 2021-11-08 ENCOUNTER — READMISSION MANAGEMENT (OUTPATIENT)
Dept: CALL CENTER | Facility: HOSPITAL | Age: 56
End: 2021-11-08

## 2021-11-08 NOTE — OUTREACH NOTE
COVID-19 Week 1 Survey      Responses   [facility] patient discharged from? [facility]   Does the patient have one of the following disease processes/diagnoses(primary or secondary)? COVID-19   COVID-19 underlying condition? Sepsis   Call Number Call 2   Week 1 Call successful? No   Discharge diagnosis Recent COVID-19 dx Sepsis secondary to CAP vs   ANA PAULA on CKD IV   Wrap up additional comments Spoke with sister Africa, states she talked to her earlier and she is fine., No answer at pt's number          Latasha Mir RN

## 2021-11-09 ENCOUNTER — READMISSION MANAGEMENT (OUTPATIENT)
Dept: CALL CENTER | Facility: HOSPITAL | Age: 56
End: 2021-11-09

## 2021-11-09 NOTE — OUTREACH NOTE
COVID-19 Week 1 Survey      Responses   [facility] patient discharged from? [facility]   Does the patient have one of the following disease processes/diagnoses(primary or secondary)? COVID-19   COVID-19 underlying condition? Sepsis   Call Number Call 3   Week 1 Call successful? Yes   Call start time 0756   Call end time 0803   Discharge diagnosis Recent COVID-19 dx Sepsis secondary to CAP vs   ANA PAULA on CKD IV   Meds reviewed with patient/caregiver? Yes   Is the patient having any side effects they believe may be caused by any medication additions or changes? No   Does the patient have all medications ordered at discharge? Yes   Is the patient taking all medications as directed (includes completed medication regime)? Yes   Does the patient have a primary care provider?  Yes   Comments regarding PCP Telehealth appt.    Does the patient have an appointment with their PCP or specialist within 7 days of discharge? Yes   Has the patient kept scheduled appointments due by today? Yes   Has home health visited the patient within 72 hours of discharge? Yes   What DME was ordered? She reports she is not using her Oxygen.    Has all DME been delivered? Yes   Psychosocial issues? No   Did the patient receive a copy of their discharge instructions? Yes   Did the patient receive a copy of COVID-19 specific instructions? Yes   Nursing interventions Reviewed instructions with patient   What is the patient's perception of their health status since discharge? Improving   Does the patient have any of the following symptoms? None   Nursing Interventions Nurse provided patient education   Pulse Ox monitoring Intermittent   Pulse Ox device source Patient   O2 Sat comments 98% at rest without 02.    O2 Sat: education provided Sat levels,  Monitoring frequency,  When to seek care   Is the patient/caregiver able to teach back steps to recovery at home? Eat a well-balance diet,  Rest and rebuild strength, gradually increase activity,  Set  small, achievable goals for return to baseline health   If the patient is a current smoker, are they able to teach back resources for cessation? Not a smoker   Is the patient/caregiver able to teach back the hierarchy of who to call/visit for symptoms/problems? PCP, Specialist, Home health nurse, Urgent Care, ED, 911 Yes   Is the patient/caregiver able to teach back Sepsis? S - Shivering,fever or very cold,  E - Extreme pain or generalized discomfort (worst ever,especially abdomen),  P - Pale or discolored skin,  S - Sleepy, difficult to arouse,confused,  I -   I feel like I might die-a feeling of hopelessness,  S - Short of breath   Nursing interventions Nurse provided patient education   Is patient/caregiver able to teach back steps to recovery at home? Eat a balanced diet,  Rest and regain strength   Is the patient/caregiver able to teach back signs and symptoms of worsening condition: Fever,  Rapid heart rate (>90),  Altered mental status(confusion/coma)   COVID-19 call completed? Yes   Wrap up additional comments She reports she is fine.           Gricelda Gaming RN

## 2021-11-12 ENCOUNTER — READMISSION MANAGEMENT (OUTPATIENT)
Dept: CALL CENTER | Facility: HOSPITAL | Age: 56
End: 2021-11-12

## 2021-11-12 NOTE — OUTREACH NOTE
COVID-19 Week 2 Survey      Responses   [facility] patient discharged from? [facility]   Does the patient have one of the following disease processes/diagnoses(primary or secondary)? COVID-19   COVID-19 underlying condition? Sepsis   Call Number Call 1   COVID-19 Week 2: Call 1 attempt successful? Yes   Call start time 0747   Call end time 0758   Discharge diagnosis Recent COVID-19 dx Sepsis secondary to CAP vs   ANA PAULA on CKD IV   Meds reviewed with patient/caregiver? Yes   Is the patient having any side effects they believe may be caused by any medication additions or changes? Yes   Side effects comments  Thinks Linzess causing diarrhea, advised to call MD.   Does the patient have all medications ordered at discharge? Yes   Is the patient taking all medications as directed (includes completed medication regime)? Yes   Does the patient have a primary care provider?  Yes   Has the patient kept scheduled appointments due by today? Yes   Home health comments Says a nurse comes on Tuesday but she is not sure where she comes from   What DME was ordered? Not using O2. Sats are good on RA   What is the patient's perception of their health status since discharge? New symptoms unrelated to diagnosis  [Having diarrhea.]   Nursing Interventions Nurse provided patient education   Pulse Ox monitoring Intermittent   O2 Sat comments 97% RA   COVID-19 call completed? Yes   Wrap up additional comments Says she is zoey [place], SHAWNA

## 2021-11-19 ENCOUNTER — READMISSION MANAGEMENT (OUTPATIENT)
Dept: CALL CENTER | Facility: HOSPITAL | Age: 56
End: 2021-11-19

## 2021-11-19 NOTE — OUTREACH NOTE
COVID-19 Week 3 Survey      Responses   [facility] patient discharged from? [facility]   Does the patient have one of the following disease processes/diagnoses(primary or secondary)? COVID-19   COVID-19 underlying condition? Sepsis   Call Number Call 1   COVID-19 Week 3: Call 1 attempt successful? Yes   Call start time 1051   Call end time 1112   Discharge diagnosis Recent COVID-19 dx Sepsis secondary to CAP vs   ANA PAULA on CKD IV   Meds reviewed with patient/caregiver? Yes   Is the patient taking all medications as directed (includes completed medication regime)? Yes   Does the patient have a primary care provider?  Yes   Has the patient kept scheduled appointments due by today? Yes   What DME was ordered? Not using O2. Sats are good on RA   Psychosocial issues? No   Comments 134/56, monitoring at home.Monitoring glucose at home, three days it was >500, when she takes insulin it dropped to 50's.    What is the patient's perception of their health status since discharge? Improving   Does the patient have any of the following symptoms? None   Nursing Interventions Nurse provided patient education   Pulse Ox monitoring Intermittent   Pulse Ox device source Patient   O2 Sat comments 97% RA   Is the patient/caregiver able to teach back steps to recovery at home? Set small, achievable goals for return to baseline health,  Rest and rebuild strength, gradually increase activity   Is the patient/caregiver able to teach back the hierarchy of who to call/visit for symptoms/problems? PCP, Specialist, Home health nurse, Urgent Care, ED, 911 Yes   Is the patient/caregiver able to teach back Sepsis? S - Shivering,fever or very cold   Is patient/caregiver able to teach back steps to recovery at home? Rest and regain strength   Is the patient/caregiver able to teach back signs and symptoms of worsening condition: Fever,  Hyperthermia   COVID-19 call completed? Yes          Renetta Silva RN

## 2021-11-26 ENCOUNTER — READMISSION MANAGEMENT (OUTPATIENT)
Dept: CALL CENTER | Facility: HOSPITAL | Age: 56
End: 2021-11-26

## 2021-11-26 NOTE — OUTREACH NOTE
COVID-19 Week 4 Survey      Responses   [facility] patient discharged from? [facility]   Does the patient have one of the following disease processes/diagnoses(primary or secondary)? COVID-19   COVID-19 underlying condition? Sepsis   Call Number Call 1   COVID-19 Week 4: Call 1 attempt successful? Yes   Call start time 0822   Call end time 0828   Discharge diagnosis Recent COVID-19 dx Sepsis secondary to CAP vs   ANA PAULA on CKD IV   Has the patient kept scheduled appointments due by today? Yes   Is the patient still receiving Home Health Services? Yes   What is the patient's perception of their health status since discharge? Improving   Pulse Ox monitoring Intermittent   Pulse Ox device source Hospital   O2 Sat comments 97% room air, has not needed O2   Is the patient/caregiver able to teach back signs and symptoms of worsening condition: Fever,  Rapid heart rate (>90),  Shortness of breath/rapid respiratory rate,  Altered mental status(confusion/coma),  Edema,  High blood glucose without diabetes,  Hyperthermia   Is the patient interested in additional calls from an ambulatory ?  NOTE:  applies to high risk patients requiring additional follow-up. No   Did the patient feel the follow up calls were helpful during their recovery period? Yes   Was the number of calls appropriate? Yes   Wrap up additional comments Has not required supplemental O2.  Glucose 231. Still on steroid.           Jaylyn Cox RN

## 2021-12-04 LAB
FUNGUS SPEC CULT: ABNORMAL
FUNGUS SPEC FUNGUS STN: ABNORMAL

## 2021-12-20 LAB
ACID FAST STN SPEC: NEGATIVE
MYCOBACTERIUM SPEC QL CULT: NEGATIVE
SPECIMEN PREPARATION: NORMAL

## 2022-01-19 ENCOUNTER — HOSPITAL ENCOUNTER (EMERGENCY)
Facility: HOSPITAL | Age: 57
Discharge: HOME | End: 2022-01-19
Attending: STUDENT IN AN ORGANIZED HEALTH CARE EDUCATION/TRAINING PROGRAM | Admitting: STUDENT IN AN ORGANIZED HEALTH CARE EDUCATION/TRAINING PROGRAM

## 2022-01-19 ENCOUNTER — APPOINTMENT (OUTPATIENT)
Dept: CT IMAGING | Facility: HOSPITAL | Age: 57
End: 2022-01-19

## 2022-01-19 ENCOUNTER — APPOINTMENT (OUTPATIENT)
Dept: GENERAL RADIOLOGY | Facility: HOSPITAL | Age: 57
End: 2022-01-19

## 2022-01-19 VITALS
DIASTOLIC BLOOD PRESSURE: 99 MMHG | RESPIRATION RATE: 18 BRPM | SYSTOLIC BLOOD PRESSURE: 158 MMHG | OXYGEN SATURATION: 97 % | WEIGHT: 163 LBS | BODY MASS INDEX: 27.16 KG/M2 | HEART RATE: 85 BPM | HEIGHT: 65 IN | TEMPERATURE: 98.6 F

## 2022-01-19 DIAGNOSIS — S00.83XA CONTUSION OF FACE, INITIAL ENCOUNTER: ICD-10-CM

## 2022-01-19 DIAGNOSIS — N39.0 ACUTE UTI: Primary | ICD-10-CM

## 2022-01-19 LAB
ALBUMIN SERPL-MCNC: 3.74 G/DL (ref 3.5–5.2)
ALBUMIN/GLOB SERPL: 1.1 G/DL
ALP SERPL-CCNC: 142 U/L (ref 39–117)
ALT SERPL W P-5'-P-CCNC: 9 U/L (ref 1–33)
ANION GAP SERPL CALCULATED.3IONS-SCNC: 13.2 MMOL/L (ref 5–15)
AST SERPL-CCNC: 9 U/L (ref 1–32)
BACTERIA UR QL AUTO: ABNORMAL /HPF
BASOPHILS # BLD AUTO: 0.07 10*3/MM3 (ref 0–0.2)
BASOPHILS NFR BLD AUTO: 0.5 % (ref 0–1.5)
BILIRUB SERPL-MCNC: 0.2 MG/DL (ref 0–1.2)
BILIRUB UR QL STRIP: NEGATIVE
BUN SERPL-MCNC: 53 MG/DL (ref 6–20)
BUN/CREAT SERPL: 16.1 (ref 7–25)
CALCIUM SPEC-SCNC: 9.1 MG/DL (ref 8.6–10.5)
CHLORIDE SERPL-SCNC: 99 MMOL/L (ref 98–107)
CLARITY UR: ABNORMAL
CO2 SERPL-SCNC: 22.8 MMOL/L (ref 22–29)
COLOR UR: YELLOW
CREAT SERPL-MCNC: 3.29 MG/DL (ref 0.57–1)
DEPRECATED RDW RBC AUTO: 42.4 FL (ref 37–54)
EOSINOPHIL # BLD AUTO: 0.47 10*3/MM3 (ref 0–0.4)
EOSINOPHIL NFR BLD AUTO: 3.2 % (ref 0.3–6.2)
ERYTHROCYTE [DISTWIDTH] IN BLOOD BY AUTOMATED COUNT: 13.9 % (ref 12.3–15.4)
GFR SERPL CREATININE-BSD FRML MDRD: 14 ML/MIN/1.73
GFR SERPL CREATININE-BSD FRML MDRD: ABNORMAL ML/MIN/{1.73_M2}
GLOBULIN UR ELPH-MCNC: 3.6 GM/DL
GLUCOSE SERPL-MCNC: 449 MG/DL (ref 65–99)
GLUCOSE UR STRIP-MCNC: ABNORMAL MG/DL
HCT VFR BLD AUTO: 38.6 % (ref 34–46.6)
HGB BLD-MCNC: 12.5 G/DL (ref 12–15.9)
HGB UR QL STRIP.AUTO: ABNORMAL
HYALINE CASTS UR QL AUTO: ABNORMAL /LPF
IMM GRANULOCYTES # BLD AUTO: 0.07 10*3/MM3 (ref 0–0.05)
IMM GRANULOCYTES NFR BLD AUTO: 0.5 % (ref 0–0.5)
KETONES UR QL STRIP: NEGATIVE
LEUKOCYTE ESTERASE UR QL STRIP.AUTO: ABNORMAL
LYMPHOCYTES # BLD AUTO: 2.03 10*3/MM3 (ref 0.7–3.1)
LYMPHOCYTES NFR BLD AUTO: 13.9 % (ref 19.6–45.3)
MCH RBC QN AUTO: 27.3 PG (ref 26.6–33)
MCHC RBC AUTO-ENTMCNC: 32.4 G/DL (ref 31.5–35.7)
MCV RBC AUTO: 84.3 FL (ref 79–97)
MONOCYTES # BLD AUTO: 0.6 10*3/MM3 (ref 0.1–0.9)
MONOCYTES NFR BLD AUTO: 4.1 % (ref 5–12)
NEUTROPHILS NFR BLD AUTO: 11.38 10*3/MM3 (ref 1.7–7)
NEUTROPHILS NFR BLD AUTO: 77.8 % (ref 42.7–76)
NITRITE UR QL STRIP: NEGATIVE
NRBC BLD AUTO-RTO: 0 /100 WBC (ref 0–0.2)
PH UR STRIP.AUTO: 5.5 [PH] (ref 5–8)
PLATELET # BLD AUTO: 329 10*3/MM3 (ref 140–450)
PMV BLD AUTO: 9.6 FL (ref 6–12)
POTASSIUM SERPL-SCNC: 3.8 MMOL/L (ref 3.5–5.2)
PROT SERPL-MCNC: 7.3 G/DL (ref 6–8.5)
PROT UR QL STRIP: ABNORMAL
RBC # BLD AUTO: 4.58 10*6/MM3 (ref 3.77–5.28)
RBC # UR STRIP: ABNORMAL /HPF
REF LAB TEST METHOD: ABNORMAL
SODIUM SERPL-SCNC: 135 MMOL/L (ref 136–145)
SP GR UR STRIP: 1.02 (ref 1–1.03)
SQUAMOUS #/AREA URNS HPF: ABNORMAL /HPF
TROPONIN T SERPL-MCNC: <0.01 NG/ML (ref 0–0.03)
UROBILINOGEN UR QL STRIP: ABNORMAL
WBC # UR STRIP: ABNORMAL /HPF
WBC NRBC COR # BLD: 14.62 10*3/MM3 (ref 3.4–10.8)
YEAST URNS QL MICRO: ABNORMAL /HPF

## 2022-01-19 PROCEDURE — 84484 ASSAY OF TROPONIN QUANT: CPT | Performed by: PHYSICIAN ASSISTANT

## 2022-01-19 PROCEDURE — 70486 CT MAXILLOFACIAL W/O DYE: CPT

## 2022-01-19 PROCEDURE — 80053 COMPREHEN METABOLIC PANEL: CPT | Performed by: PHYSICIAN ASSISTANT

## 2022-01-19 PROCEDURE — 70450 CT HEAD/BRAIN W/O DYE: CPT

## 2022-01-19 PROCEDURE — 71045 X-RAY EXAM CHEST 1 VIEW: CPT | Performed by: RADIOLOGY

## 2022-01-19 PROCEDURE — 81001 URINALYSIS AUTO W/SCOPE: CPT | Performed by: PHYSICIAN ASSISTANT

## 2022-01-19 PROCEDURE — 72125 CT NECK SPINE W/O DYE: CPT

## 2022-01-19 PROCEDURE — 71045 X-RAY EXAM CHEST 1 VIEW: CPT

## 2022-01-19 PROCEDURE — 36415 COLL VENOUS BLD VENIPUNCTURE: CPT

## 2022-01-19 PROCEDURE — 99283 EMERGENCY DEPT VISIT LOW MDM: CPT

## 2022-01-19 PROCEDURE — 93005 ELECTROCARDIOGRAM TRACING: CPT | Performed by: PHYSICIAN ASSISTANT

## 2022-01-19 PROCEDURE — 70486 CT MAXILLOFACIAL W/O DYE: CPT | Performed by: RADIOLOGY

## 2022-01-19 PROCEDURE — 72125 CT NECK SPINE W/O DYE: CPT | Performed by: RADIOLOGY

## 2022-01-19 PROCEDURE — 70450 CT HEAD/BRAIN W/O DYE: CPT | Performed by: RADIOLOGY

## 2022-01-19 PROCEDURE — 85025 COMPLETE CBC W/AUTO DIFF WBC: CPT | Performed by: PHYSICIAN ASSISTANT

## 2022-01-19 RX ORDER — CLONIDINE HYDROCHLORIDE 0.1 MG/1
0.1 TABLET ORAL ONCE
Status: COMPLETED | OUTPATIENT
Start: 2022-01-19 | End: 2022-01-19

## 2022-01-19 RX ORDER — CEFDINIR 300 MG/1
300 CAPSULE ORAL 2 TIMES DAILY
Qty: 20 CAPSULE | Refills: 0 | Status: SHIPPED | OUTPATIENT
Start: 2022-01-19 | End: 2022-01-29

## 2022-01-19 RX ADMIN — CLONIDINE HYDROCHLORIDE 0.1 MG: 0.1 TABLET ORAL at 16:53

## 2022-01-19 NOTE — ED PROVIDER NOTES
Subjective     Fall  Mechanism of injury: fall    Injury location:  Head/neck and face  Head/neck injury location:  Head  Facial injury location:  Face  Fall:     Fall occurred: tripped.    Impact surface:  Athletic surface    Point of impact:  Head and face  Associated symptoms: no abdominal pain, no hearing loss, no nausea and no seizures    Risk factors: no anticoagulation therapy, no beta blocker therapy, no CAD, no CHF, no diabetes, no dialysis, no kidney disease and not pregnant        Review of Systems   Constitutional: Negative.    HENT: Negative.  Negative for hearing loss.    Eyes: Negative.    Respiratory: Negative.    Cardiovascular: Negative.    Gastrointestinal: Negative.  Negative for abdominal pain and nausea.   Endocrine: Negative.    Genitourinary: Negative.    Musculoskeletal: Negative.    Skin: Negative.    Allergic/Immunologic: Negative.    Neurological: Negative.  Negative for seizures.   Hematological: Negative.    Psychiatric/Behavioral: Negative.        Past Medical History:   Diagnosis Date   • Chronic kidney disease     only has one kidney, has been in hoptal 3 times since last visit    • ESRD on hemodialysis (HCC)    • Essential hypertension    • Hepatic steatosis    • History of noncompliance with medical treatment    • Pre-transplant evaluation for kidney transplant 03/31/2021    Declined by the [facility]   • Single kidney    • Type 2 diabetes mellitus (HCC)        Allergies   Allergen Reactions   • Bactrim [Sulfamethoxazole-Trimethoprim] Anaphylaxis   • Sulfa Antibiotics Anaphylaxis   • Benadryl [Diphenhydramine Hcl (Sleep)]    • Contrast Dye Nausea And Vomiting     diarrea   • Penicillins        Past Surgical History:   Procedure Laterality Date   • BRONCHOSCOPY N/A 11/2/2021    Procedure: BRONCHOSCOPY;  Surgeon: Brodie Dobbins MD;  Location: [facility];  Service: Pulmonary;  Laterality: N/A;   • HYSTERECTOMY         Family History   Problem Relation Age of Onset   •  Diabetes Mother    • Hypertension Mother    • Diabetes Father    • Hypertension Father    • Diabetes Sister    • Hypertension Sister    • Diabetes Brother        Social History     Socioeconomic History   • Marital status: Single   Tobacco Use   • Smoking status: Never Smoker   • Smokeless tobacco: Never Used   Substance and Sexual Activity   • Alcohol use: No   • Drug use: No   • Sexual activity: Defer           Objective   Physical Exam  Vitals and nursing note reviewed.   Constitutional:       Appearance: She is well-developed.   HENT:      Head: Normocephalic.      Right Ear: External ear normal.      Left Ear: External ear normal.   Eyes:      Conjunctiva/sclera: Conjunctivae normal.      Pupils: Pupils are equal, round, and reactive to light.   Cardiovascular:      Rate and Rhythm: Normal rate and regular rhythm.      Heart sounds: Normal heart sounds.   Pulmonary:      Effort: Pulmonary effort is normal.      Breath sounds: Normal breath sounds.   Abdominal:      General: Bowel sounds are normal.      Palpations: Abdomen is soft.   Musculoskeletal:         General: Normal range of motion.      Cervical back: Normal range of motion and neck supple.   Skin:     General: Skin is warm and dry.      Capillary Refill: Capillary refill takes less than 2 seconds.      Comments: Swelling/ecchymosis left periorbital area; tenderness; some tenderness lower paraspinous area c-spine     Neurological:      Mental Status: She is alert and oriented to person, place, and time.   Psychiatric:         Behavior: Behavior normal.         Thought Content: Thought content normal.         Procedures           ED Course  ED Course as of 01/21/22 1627   Wed Jan 19, 2022   1330 EKG noted sinus rhythm.  97 bpm.  .  QRS 76.  QTc 441.  No acute ST elevation [SF]   1723 Discussed discharge instructions, treatment plan, follow-up patient. patient reports that she is aware of her elevated kidney function is being treated for renal  failure.  Patient reports that she will follow-up on this and her elevated blood glucose.  States she has not checked her sugar and can treat this at home. [DAHIANA]      ED Course User Index  [DAHIANA] Virgil Limon APRN  [SF] Masood Montana DO                                                 MDM    Final diagnoses:   Acute UTI   Contusion of face, initial encounter       ED Disposition  ED Disposition     ED Disposition Condition Comment    Discharge Stable           Buck Wallis, WISAM  [address] [facility] [address] [phone]    Schedule an appointment as soon as possible for a visit   For further evaluation         Medication List      New Prescriptions    cefdinir 300 MG capsule  Commonly known as: OMNICEF  Take 1 capsule by mouth 2 (Two) Times a Day for 10 days.           Where to Get Your Medications      These medications were sent to [facility] - [place], [place] - [address] - [phone]  - [phone] FX  [address], [facility] [address]    Phone: [phone]   · cefdinir 300 MG capsule          Virgil Limon, WISAM  01/21/22 4240

## 2022-01-19 NOTE — ED NOTES
MEDICAL SCREENING:    Reason for Visit: fall on Monday night after an episode of dizziness with head, facial and neck injuries     Patient initially seen in triage.  The patient was advised further evaluation and diagnostic testing will be needed, some of the treatment and testing will be initiated in the lobby in order to begin the process.  The patient will be returned to the waiting area for the time being and possibly be re-assessed by a subsequent ED provider.  The patient will be brought back to the treatment area in as timely manner as possible.       Sarahi Tejada PA  01/19/22 4973

## 2022-01-20 LAB
QT INTERVAL: 348 MS
QTC INTERVAL: 441 MS
